# Patient Record
Sex: FEMALE | Race: OTHER | Employment: UNEMPLOYED | URBAN - METROPOLITAN AREA
[De-identification: names, ages, dates, MRNs, and addresses within clinical notes are randomized per-mention and may not be internally consistent; named-entity substitution may affect disease eponyms.]

---

## 2022-02-22 ENCOUNTER — OFFICE VISIT (OUTPATIENT)
Dept: FAMILY MEDICINE CLINIC | Facility: CLINIC | Age: 72
End: 2022-02-22

## 2022-02-22 ENCOUNTER — TRANSCRIBE ORDER (OUTPATIENT)
Dept: SCHEDULING | Age: 72
End: 2022-02-22

## 2022-02-22 ENCOUNTER — HOSPITAL ENCOUNTER (OUTPATIENT)
Dept: LAB | Age: 72
Discharge: HOME | End: 2022-02-22

## 2022-02-22 VITALS
SYSTOLIC BLOOD PRESSURE: 137 MMHG | DIASTOLIC BLOOD PRESSURE: 83 MMHG | HEART RATE: 69 BPM | BODY MASS INDEX: 34.63 KG/M2 | TEMPERATURE: 97 F | WEIGHT: 165 LBS | OXYGEN SATURATION: 96 % | HEIGHT: 58 IN | RESPIRATION RATE: 16 BRPM

## 2022-02-22 DIAGNOSIS — Z00.00 ROUTINE GENERAL MEDICAL EXAMINATION AT A HEALTH CARE FACILITY: ICD-10-CM

## 2022-02-22 DIAGNOSIS — Z87.11 HISTORY OF STOMACH ULCERS: ICD-10-CM

## 2022-02-22 DIAGNOSIS — M25.561 CHRONIC PAIN OF BOTH KNEES: ICD-10-CM

## 2022-02-22 DIAGNOSIS — M25.562 CHRONIC PAIN OF BOTH KNEES: ICD-10-CM

## 2022-02-22 DIAGNOSIS — R22.41 LUMP OF SKIN OF RIGHT LOWER EXTREMITY: ICD-10-CM

## 2022-02-22 DIAGNOSIS — Z87.19 HISTORY OF ABDOMINAL HERNIA: ICD-10-CM

## 2022-02-22 DIAGNOSIS — R22.41 LUMP OF SKIN OF RIGHT LOWER EXTREMITY: Primary | ICD-10-CM

## 2022-02-22 DIAGNOSIS — G89.29 CHRONIC PAIN OF BOTH KNEES: ICD-10-CM

## 2022-02-22 LAB
ALBUMIN SERPL-MCNC: 3.7 G/DL (ref 3.4–5)
ALBUMIN/GLOB SERPL: 1.1 {RATIO} (ref 0.8–1.7)
ALP SERPL-CCNC: 60 U/L (ref 45–117)
ALT SERPL-CCNC: 22 U/L (ref 13–56)
ANION GAP SERPL CALC-SCNC: 6 MMOL/L (ref 3–18)
AST SERPL-CCNC: 20 U/L (ref 10–38)
BASOPHILS # BLD: 0 K/UL (ref 0–0.1)
BASOPHILS NFR BLD: 1 % (ref 0–2)
BILIRUB SERPL-MCNC: 0.3 MG/DL (ref 0.2–1)
BUN SERPL-MCNC: 18 MG/DL (ref 7–18)
BUN/CREAT SERPL: 19 (ref 12–20)
CALCIUM SERPL-MCNC: 10 MG/DL (ref 8.5–10.1)
CHLORIDE SERPL-SCNC: 100 MMOL/L (ref 100–111)
CO2 SERPL-SCNC: 30 MMOL/L (ref 21–32)
CREAT SERPL-MCNC: 0.94 MG/DL (ref 0.6–1.3)
DIFFERENTIAL METHOD BLD: ABNORMAL
EOSINOPHIL # BLD: 0.2 K/UL (ref 0–0.4)
EOSINOPHIL NFR BLD: 3 % (ref 0–5)
ERYTHROCYTE [DISTWIDTH] IN BLOOD BY AUTOMATED COUNT: 12.9 % (ref 11.6–14.5)
GLOBULIN SER CALC-MCNC: 3.3 G/DL (ref 2–4)
GLUCOSE SERPL-MCNC: 114 MG/DL (ref 74–99)
HCT VFR BLD AUTO: 36.1 % (ref 35–45)
HGB BLD-MCNC: 11.9 G/DL (ref 12–16)
IMM GRANULOCYTES # BLD AUTO: 0 K/UL (ref 0–0.04)
IMM GRANULOCYTES NFR BLD AUTO: 0 % (ref 0–0.5)
LYMPHOCYTES # BLD: 0.9 K/UL (ref 0.9–3.6)
LYMPHOCYTES NFR BLD: 12 % (ref 21–52)
MCH RBC QN AUTO: 28.1 PG (ref 24–34)
MCHC RBC AUTO-ENTMCNC: 33 G/DL (ref 31–37)
MCV RBC AUTO: 85.1 FL (ref 78–100)
MONOCYTES # BLD: 0.8 K/UL (ref 0.05–1.2)
MONOCYTES NFR BLD: 10 % (ref 3–10)
NEUTS SEG # BLD: 5.5 K/UL (ref 1.8–8)
NEUTS SEG NFR BLD: 73 % (ref 40–73)
NRBC # BLD: 0 K/UL (ref 0–0.01)
NRBC BLD-RTO: 0 PER 100 WBC
PLATELET # BLD AUTO: 301 K/UL (ref 135–420)
PMV BLD AUTO: 9.8 FL (ref 9.2–11.8)
POTASSIUM SERPL-SCNC: 4 MMOL/L (ref 3.5–5.5)
PROT SERPL-MCNC: 7 G/DL (ref 6.4–8.2)
RBC # BLD AUTO: 4.24 M/UL (ref 4.2–5.3)
SODIUM SERPL-SCNC: 136 MMOL/L (ref 136–145)
WBC # BLD AUTO: 7.5 K/UL (ref 4.6–13.2)

## 2022-02-22 PROCEDURE — 85025 COMPLETE CBC W/AUTO DIFF WBC: CPT

## 2022-02-22 PROCEDURE — 86677 HELICOBACTER PYLORI ANTIBODY: CPT

## 2022-02-22 PROCEDURE — 80053 COMPREHEN METABOLIC PANEL: CPT

## 2022-02-22 PROCEDURE — 99204 OFFICE O/P NEW MOD 45 MIN: CPT | Performed by: CLINICAL NURSE SPECIALIST

## 2022-02-22 NOTE — PROGRESS NOTES
MARIA ELENA Montejo is a 66 y.o. female being seen today for   Chief Complaint   Patient presents with    Skin Problem     on the right inner thigh. Pt first notice in 10/2022. Pt is starting to experiencing burning and leg swelling   . Patient presents to this visit with her daughter. States that she recently relocated to the [place] from [facility] on a visa. Per daughter, mom was alone in [address] for 4 years with no one to watch out for her. Would talk to the patient on the phone daily, but mom always reassured her that everything was okay. Upon arrival, daughter noted that patient had a \"ball\" to her right inner thigh. The ball was small, but is now the size of an orange. Patient admits that she first noticed this lump in October of last year. Went to the doctor in [address], they did some tests, but have not received the results. Patient admits that this lump was not initially painful, but now it has a burning feeling. Admits that RLE sometimes swell. Never has LLE swelling. Daughter states that she is very concerned as they recently had a friend who passed away d/t cancer on his face and he reported that it would burn. Also mom has some discomfort to her knees, this is not new. Wonders what she should take to help with this. In [address], was told that she had a stomach ulcer as well as a hernia to the right of her belly button. They advised that they would need to manage the lump prior to working on her hernia. Has never been tested for H. Pylori that she is aware of. Pt has a longstanding history of HTN and DM, has all of her medications with her from [address] and does not need any refills at this time. Past Medical History:   Diagnosis Date    Diabetes ([facility]     Hypertension          ROS  Patient states that she is feeling well. Denies complaints of chest pain, shortness of breath, swelling of legs, dizziness or weakness. she denies nausea, vomiting or diarrhea.         Current Outpatient Medications   Medication Sig    LOSARTAN PO Take  by mouth.  verapamil HCl (VERAPAMIL PO) Take  by mouth.  metformin HCl (METFORMIN PO) Take  by mouth.  GLIPIZIDE PO Take  by mouth. No current facility-administered medications for this visit. PE  Visit Vitals  /83 (BP 1 Location: Left arm, BP Patient Position: Sitting, BP Cuff Size: Large adult long)   Pulse 69   Temp 97 °F (36.1 °C) (Temporal)   Resp 16   Ht 4' 10\" (1.473 m)   Wt 165 lb (74.8 kg)   SpO2 96%   BMI 34.49 kg/m²        Alert and oriented with normal mood and affect. she is well developed and well nourished . Lungs are clear without wheezing. Heart rate is regular without murmurs or gallops. There is no lower extremity edema. RLE: medial thigh with slightly fixed, soft, flesh-colored lump about the size of a baseball, nontender to touch     No results found for this or any previous visit. Assessment and Plan:    Baseline labs obtained this visit. Uncertain what to make of lump to RT medial thigh. Order for US of lump, may need further imaging or biopsy if unremarkable. Will likely initiate a PPI, however, would like to first r/o H. Pylori given H&P  Referral for GI for management of hernia and ulcer. Discussed OTC management of knee pain. Encouraged to call with questions or concerns. Will f/u per labs/US and advise on any additional POC. ICD-10-CM ICD-9-CM    1. Lump of skin of right lower extremity  R22.41 782.2       US EXT NONVAS RT COMP   2. Routine general medical examination at a health care facility  Z00.00 V70.0 H PYLORI AB, IGG, QT      CBC WITH AUTOMATED DIFF      METABOLIC PANEL, COMPREHENSIVE      REFERRAL TO GASTROENTEROLOGY   3. History of stomach ulcers  Z87.11 V12.79    4.  History of abdominal hernia  Z87.19 V12.79            Mandy Meza NP

## 2022-02-22 NOTE — PROGRESS NOTES
Patient presents for lab draw ordered by:    Ordering Provider:  Ariella Johnson  Ordering Department/Practice:  [facility]  Phone:  [phone]  Date Ordered:  2/22/2022    The following labs were drawn and sent to [facility] by Yoni Starr:    CBC, BMP and H Pylori    The following tubes were sent:    Gold  ( 2) and Lavender  ( 1)    Draw site left brachial.  Patient tolerated draw with no distress.

## 2022-02-22 NOTE — PROGRESS NOTES
Pat financial assistance application given to patient daughter    Patient daughter advised of ultrasound appointment 2/26/22 2:30 pm arrive at 2:00 pm [facility]     Discharge instructions reviewed with patient daughter    Medication list and understanding of medications reviewed with patient daughter. OTC and herbal medications reviewed and added to med list if applicable  Barriers to adherence assessed. Guidance given regarding new medications this visit, including reason for taking this medicine, and common side effects. AVS given to patient daughter. Explained to patient daughter. Patient daughter expressed understanding.

## 2022-02-23 LAB — H PYLORI IGG SER IA-ACNC: 1.52 INDEX VALUE (ref 0–0.79)

## 2022-02-23 NOTE — PROGRESS NOTES
I have reviewed the attatched progress note and I agree with the plan and medical decision making as outlined by Herve Verdugo MD

## 2022-02-25 ENCOUNTER — TELEPHONE (OUTPATIENT)
Dept: FAMILY MEDICINE CLINIC | Facility: CLINIC | Age: 72
End: 2022-02-25

## 2022-02-25 NOTE — TELEPHONE ENCOUNTER
Patient daughter advised of Gastroenterology appointment Dr Vishal Castanon  4/7/22 3:30 pm [address] [facility] [phone] [id number] $50.00 co pay due at time of service

## 2022-02-26 ENCOUNTER — HOSPITAL ENCOUNTER (OUTPATIENT)
Dept: ULTRASOUND IMAGING | Age: 72
Discharge: HOME | End: 2022-02-26

## 2022-02-26 DIAGNOSIS — R22.41 LUMP OF SKIN OF RIGHT LOWER EXTREMITY: ICD-10-CM

## 2022-02-26 PROCEDURE — 76882 US LMTD JT/FCL EVL NVASC XTR: CPT

## 2022-02-28 ENCOUNTER — TELEPHONE (OUTPATIENT)
Dept: FAMILY MEDICINE CLINIC | Facility: CLINIC | Age: 72
End: 2022-02-28

## 2022-02-28 DIAGNOSIS — Z12.31 ENCOUNTER FOR SCREENING MAMMOGRAM FOR MALIGNANT NEOPLASM OF BREAST: ICD-10-CM

## 2022-02-28 DIAGNOSIS — R22.41 LUMP OF SKIN OF RIGHT LOWER EXTREMITY: Primary | ICD-10-CM

## 2022-02-28 DIAGNOSIS — Z12.11 SCREENING FOR COLON CANCER: ICD-10-CM

## 2022-02-28 DIAGNOSIS — Z12.4 SCREENING FOR CERVICAL CANCER: ICD-10-CM

## 2022-02-28 NOTE — TELEPHONE ENCOUNTER
Spoke to patient's daughter Delaney Specking. Patient present with Delaney Specking during conversation. Mitrionics  14316 utilized. Discussed results of RLE ultrasound. States that they received results from biopsy in [address] that stated that it was cancer and that it originated at the site of the lump. She also mentioned that they advised that it should be taken care of immediately as there isn't a ton of time left. Has never had a screening colonoscopy. Had a mammogram 3 years ago that was normal, was told that she could discontinue these. Had her last pap smear two years ago, normal, was told that it was ok to discontinue paps. Discussed referral to general surgery for biopsy as well as referral to GI for screening colonoscopy, will suggest an endoscopy as well d/t reported history of ulcer and elevated H. Pylori Ab IGG. Uncertain why AB did not reflex for igM, will repeat this at f/u. Encouraged to maintain f/u for this coming Wednesday and to bring biopsy report from [address] with her. Understanding of all teaching verbalized. Collaborated  on treatment plan with Dr. Lakeisha Kaye.

## 2022-03-02 ENCOUNTER — HOSPITAL ENCOUNTER (OUTPATIENT)
Dept: LAB | Age: 72
Discharge: HOME | End: 2022-03-02

## 2022-03-02 ENCOUNTER — OFFICE VISIT (OUTPATIENT)
Dept: FAMILY MEDICINE CLINIC | Facility: CLINIC | Age: 72
End: 2022-03-02

## 2022-03-02 ENCOUNTER — TELEPHONE (OUTPATIENT)
Dept: FAMILY MEDICINE CLINIC | Facility: CLINIC | Age: 72
End: 2022-03-02

## 2022-03-02 VITALS
OXYGEN SATURATION: 94 % | SYSTOLIC BLOOD PRESSURE: 119 MMHG | DIASTOLIC BLOOD PRESSURE: 78 MMHG | TEMPERATURE: 97.3 F | HEART RATE: 85 BPM | BODY MASS INDEX: 35.05 KG/M2 | WEIGHT: 167 LBS | RESPIRATION RATE: 18 BRPM | HEIGHT: 58 IN

## 2022-03-02 DIAGNOSIS — R76.8 HELICOBACTER PYLORI ANTIBODY POSITIVE: ICD-10-CM

## 2022-03-02 DIAGNOSIS — M17.0 PRIMARY OSTEOARTHRITIS OF BOTH KNEES: ICD-10-CM

## 2022-03-02 DIAGNOSIS — R22.41 LUMP OF SKIN OF RIGHT LOWER EXTREMITY: Primary | ICD-10-CM

## 2022-03-02 DIAGNOSIS — E11.9 DIABETES MELLITUS WITHOUT COMPLICATION (HCC): ICD-10-CM

## 2022-03-02 DIAGNOSIS — R11.0 NAUSEA: ICD-10-CM

## 2022-03-02 LAB
EST. AVERAGE GLUCOSE BLD GHB EST-MCNC: 148 MG/DL
HBA1C MFR BLD: 6.8 % (ref 4.2–5.6)

## 2022-03-02 PROCEDURE — 83036 HEMOGLOBIN GLYCOSYLATED A1C: CPT

## 2022-03-02 PROCEDURE — 86677 HELICOBACTER PYLORI ANTIBODY: CPT

## 2022-03-02 PROCEDURE — 99214 OFFICE O/P EST MOD 30 MIN: CPT | Performed by: FAMILY MEDICINE

## 2022-03-02 RX ORDER — ONDANSETRON 4 MG/1
4 TABLET, ORALLY DISINTEGRATING ORAL
Qty: 30 TABLET | Refills: 1 | Status: SHIPPED | OUTPATIENT
Start: 2022-03-02 | End: 2022-04-20 | Stop reason: SDUPTHER

## 2022-03-02 NOTE — TELEPHONE ENCOUNTER
Referral faxed to [facility] surgical specialist/Dr Fannie Box nurse .  They will call patient to schedule evaluation for colon cancer screening

## 2022-03-02 NOTE — PROGRESS NOTES
Patient presents for lab draw ordered by:    Ordering Provider: Clayton Baig NP  Ordering Department/Practice:  [address] [facility]  Phone:  [phone]  Date Ordered:  3/2/22    The [facility] Le[facility]:    HgA1C and H Pylori AB    The following tubes were sent:    Gold  ( 1) and Lavender  ( 1)    Draw site right brachial.  Patient tolerated draw with no distress.

## 2022-03-02 NOTE — PROGRESS NOTES
Patient daughter advised of appointment with surgeon Dr Kimberly Landrum  3/10/22 10:30 am [address] [phone] Daughter also advised colorectal surgeon (Dr Walker Ruff ) office will be calling her to schedule appointment for colon cancer screening. Patient daughter advised will fax paper work to [facility] [employer] [facility] [facility] they will call her  to schedule mother's  mammogram    Good Rx card given to patient daughter    Discharge instructions reviewed with patient daughtet    Medication list and understanding of medications reviewed with patient  daughter. OTC and herbal medications reviewed and added to med list if applicable  Barriers to adherence assessed. Guidance given regarding new medications this visit, including reason for taking this medicine, and common side effects. AVS given to patient daughter. Explained to patient daughter. Patient daughter expressed understanding.

## 2022-03-02 NOTE — PROGRESS NOTES
MARIA ELENA  Jamie Aguiar is a 66 y.o. female being seen today for   Chief Complaint   Patient presents with    Knee Pain     both knee   . she states that she is generally doing alright. Daughter present with her this visit. Previously was seen for RLE hard mass that has grown over time. Had an [address] last week, radiologist impression was pathologically enlarged lymph node vs soft tissue neoplasm given the sonographic appearance. No prior imaging on file. [place] [address] had a biopsy of site, recently received results, brought them in this visit. 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 Patient's daughter spoke to the doctor who said that from their standpoint it is inoperable due to the location. States that blood sugar has been low recently. Admits that she has nausea which interferes with her appetite. This has been going on for the last two months. Daughter states that she makes mom a fruit or vegetable smoothie every morning which fills the patient up. Was taking a medication that she received in [address] for the nausea, but it did not help. Also has dizziness at times. Has a longstanding hx of OA. Having bilateral knee pain that is 10/10 with climbing stairs, has had a joint injection in the left knee before. Wonders if it would be an option to get both knees injected in the near future. Has tried tylenol as well as ice with little to no relief.  #[id number] used for portions of this visit. Past Medical History:   Diagnosis Date    Diabetes ([facility] [address] [facility]     Hypertension          ROS  Patient states that she is feeling well. Denies complaints of chest pain, shortness of breath, swelling of legs, dizziness or weakness. she denies vomiting or diarrhea.    +nausea    Current Outpatient Medications   Medication Sig    ondansetron (ZOFRAN ODT) 4 mg disintegrating tablet Take 1 Tablet by mouth every eight (8) hours as needed for Nausea or Vomiting.  LOSARTAN PO Take  by mouth.     metformin HCl (METFORMIN PO) Take  by mouth.  GLIPIZIDE PO Take  by mouth.  verapamil HCl (VERAPAMIL PO) Take  by mouth. No current facility-administered medications for this visit. PE  Visit Vitals  /78 (BP 1 Location: Left upper arm, BP Patient Position: Sitting, BP Cuff Size: Adult)   Pulse 85   Temp 97.3 °F (36.3 °C) (Temporal)   Resp 18   Ht 4' 10\" (1.473 m)   Wt 167 lb (75.8 kg)   SpO2 94%   BMI 34.90 kg/m²        Alert and oriented with normal mood and affect. Results for orders placed or performed during the hospital encounter of 02/22/22   H PYLORI AB, IGG, QT   Result Value Ref Range    H. pylori Ab, IgG 1.52 (H) 0.00 - 0.79 Index Value   CBC WITH AUTOMATED DIFF   Result Value Ref Range    WBC 7.5 4.6 - 13.2 K/uL    RBC 4.24 4.20 - 5.30 M/uL    HGB 11.9 (L) 12.0 - 16.0 g/dL    HCT 36.1 35.0 - 45.0 %    MCV 85.1 78.0 - 100.0 FL    MCH 28.1 24.0 - 34.0 PG    MCHC 33.0 31.0 - 37.0 g/dL    RDW 12.9 11.6 - 14.5 %    PLATELET 271 323 - 534 K/uL    MPV 9.8 9.2 - 11.8 FL    NRBC 0.0 0  WBC    ABSOLUTE NRBC 0.00 0.00 - 0.01 K/uL    NEUTROPHILS 73 40 - 73 %    LYMPHOCYTES 12 (L) 21 - 52 %    MONOCYTES 10 3 - 10 %    EOSINOPHILS 3 0 - 5 %    BASOPHILS 1 0 - 2 %    IMMATURE GRANULOCYTES 0 0.0 - 0.5 %    ABS. NEUTROPHILS 5.5 1.8 - 8.0 K/UL    ABS. LYMPHOCYTES 0.9 0.9 - 3.6 K/UL    ABS. MONOCYTES 0.8 0.05 - 1.2 K/UL    ABS. EOSINOPHILS 0.2 0.0 - 0.4 K/UL    ABS. BASOPHILS 0.0 0.0 - 0.1 K/UL    ABS. IMM.  GRANS. 0.0 0.00 - 0.04 K/UL    DF AUTOMATED     METABOLIC PANEL, COMPREHENSIVE   Result Value Ref Range    Sodium 136 136 - 145 mmol/L    Potassium 4.0 3.5 - 5.5 mmol/L    Chloride 100 100 - 111 mmol/L    CO2 30 21 - 32 mmol/L    Anion gap 6 3.0 - 18 mmol/L    Glucose 114 (H) 74 - 99 mg/dL    BUN 18 7.0 - 18 MG/DL    Creatinine 0.94 0.6 - 1.3 MG/DL    BUN/Creatinine ratio 19 12 - 20      GFR est AA >60 >60 ml/min/1.73m2    GFR est non-AA 58 (L) >60 ml/min/1.73m2    Calcium 10.0 8.5 - 10.1 MG/DL    Bilirubin, total 0.3 0.2 - 1.0 MG/DL    ALT (SGPT) 22 13 - 56 U/L    AST (SGOT) 20 10 - 38 U/L    Alk. phosphatase 60 45 - 117 U/L    Protein, total 7.0 6.4 - 8.2 g/dL    Albumin 3.7 3.4 - 5.0 g/dL    Globulin 3.3 2.0 - 4.0 g/dL    A-G Ratio 1.1 0.8 - 1.7           Assessment and Plan:    Discussed appointment with general surgery on 3/10. Advised that we will not know what treatment options may be until evaluated by gen surg. Discussed indication for colonoscopy and mammogram.   High suspicion that nausea is related to not eating and stomach ulcer. Pending H. Pylori IgM, will likely treat with PPI and antibiotics. Hopeful that colorectal specialist will do an endoscopy at the same time as colonoscopy. Dizziness seems to correlate with decreased appetite and low BS. Educated on small frequent meals as well as zofran and indication. Aware of need to DC antiemetic from [address]. Advised on indication for avoiding antiinflammatories/steroids for now. Declined topical pain reliever. Encouraged to return in near future for bilateral knee injections. Will f/u per labs and advise on any additional POC. Instructed to call with any questions or concerns. ICD-10-CM ICD-9-CM    1. Lump of skin of right lower extremity  R22.41 782.2    2. Helicobacter pylori antibody positive  R76.8 795.79 H PYLORI AB, IGM   3. Nausea  R11.0 787.02    4.  Di[facility] mellitus without complication [facility]  B34.3 250.00 HEMOGLOBIN A1C WITH EAG           Jet Rogers NP

## 2022-03-02 NOTE — TELEPHONE ENCOUNTER
Client Eligibility form faxed to [facility] at [phone] .  They will call her to schedule mammogram

## 2022-03-02 NOTE — PATIENT INSTRUCTIONS
Aprenda sobre la colonoscopia  Learning About Colonoscopy  ¿Qué es matilde colonoscopia? Matilde colonoscopia es matilde prueba (también llamada procedimiento) que le permite a un médico christine dentro del intestino grueso. El médico [place] un tubo avelar con kari, llamado colonoscopio. El médico lo [place] para buscar pequeños crecimientos llamados pólipos, cáncer de colon o recto (cáncer colorrectal), u otros problemas jeannie sangrado. Hong el procedimiento, el médico puede papi muestras de tejido. Luego, las muestras pueden ser analizadas para christine si tienen cáncer u otras afecciones. El médico también puede extraer los pólipos. ¿Cómo se hace matilde colonoscopia? Subha procedimiento se lleva a cabo en el consultorio de un médico, en matilde clínica o en un hospital. Makayla Jens un medicamento para ayudarle a relajarse y para que no sienta dolor. Algunas personas observan que no recuerdan que se les haya hecho la prueba debido al [employer]. El médico mueve suavemente el colonoscopio (o endoscopio) a través del colon. El endoscopio también es matilde pequeña cámara de video. Le permite al médico christine el colon y obtener imágenes. ¿Cómo se prepara usted para el procedimiento? Usted necesita limpiar el colon antes del procedimiento para que el médico pueda observar el colon. [place] depende del tipo de \"preparación del colon\" que le recomiende vuong médico.  Para limpiar el colon, usted hará matilde preparación del colon antes de la prueba. [place] significa dejar de comer alimentos sólidos y beber solamente líquidos siobhan. Usted puede consumir agua, té, café, jugos siobhan, caldos siobhan, paletas de agua saborizadas y gelatina (jeannie Jell-O). No kayy nada de color jain o aly. El día o la noche antes del procedimiento, usted eder matilde gran cantidad de un líquido especial. [place] causa heces flojas y frecuentes. Irá muchas veces al baño. Vuong médico podría indicarle que tome parte del líquido la noche anterior y el khadar el día de la prueba.  Es [address] [facility] importante que queta todo el líquido. Si tiene problemas para beberlo, llame a vunog Cynda Gallus arreglos con alguien para que lo lleve a casa después de la prueba. ¿Qué puede esperar después de matilde colonoscopia? Vuong médico le indicará cuándo podrá comer y hacer thor actividades habituales. Queta abundantes líquidos después de la prueba para reponer los líquidos que pueda danial perdido marty la preparación del colon. [place] no queta alcohol. Vuong médico hablará con usted acerca de cuándo deberá hacerse la próxima colonoscopia. Los resultados de vuong prueba y vuong riesgo de cáncer colorrectal ayudarán a vuong médico a decidir con qué frecuencia necesita hacerse controles. Después de la prueba, podría sentirse abotagado o tener herminio por gases. Simone vez necesite expulsar gases. Si le hicieron matilde biopsia o le extirparon un pólipo, podría tener rastros de [place] en las heces por unos días. Consulte a vuong médico para saber cuándo es seguro papi aspirina o medicamentos antiinflamatorios no esteroides (EDY) nuevamente. Problemas jeannie sangrado rectal intenso podrían no producirse hasta varias semanas después de la prueba. [place] no es común. Chaka podría suceder después de la extracción de pólipos. La atención de seguimiento es matilde parte clave de vuong tratamiento y seguridad. Asegúrese de hacer y acudir a todas las citas, y llame a vuong médico si está teniendo problemas. También es matilde buena idea saber los resultados de thor exámenes y mantener matilde lista de los medicamentos que ambrocio. ¿Dónde puede encontrar más información en inglés? Vaya a http://www.murillo.com/  Rodger Ruff [id number] en la búsqueda para aprender más acerca de \"Aprenda sobre la colonoscopia. \"  Revisado: 17 diciembre, 2020               Versión del contenido: 13.0  © 3472-8851 Healthwise, Incorporated. Las instrucciones de cuidado fueron adaptadas bajo licencia por [employer] (which disclaims liability or warranty for this information).  Si usted tiene [place] afección médica o sobre estas instrucciones, siempre pregunte a vuong profesional de linda. [employer] Incorporated niega toda garantía o responsabilidad por vuong uso de esta información. Infección bacteriana por H. pylori: Instrucciones de cuidado  H. Pylori Bacterial Infection: Care Instructions  Instrucciones de cuidado    Vuong prueba muestra la presencia de Helicobacter pylori (H. pylori), un tipo de bacteria que vive en el revestimiento del estómago. Muchas personas tienen H. pylori en thor estómagos y no tienen problemas. Chaka algunas veces la H. pylori causa malestar estomacal o matilde llaga (úlcera) en el revestimiento del estómago. La mayoría de las úlceras estomacales son causadas por la H. pylori. Entre los síntomas de matilde úlcera se encuentran dolor persistente o abrasador en el vientre que puede durar desde unos minutos hasta horas. El consumo de alimentos o antiácidos ayuda a aliviar el dolor, [employer] síntomas podrían reaparecer después de un [place]. Un antibiótico puede curar matilde infección por H. pylori. La atención de seguimiento es matilde parte clave de vuong tratamiento y seguridad. Asegúrese de hacer y acudir a todas las citas, y llame a vuong médico si está teniendo problemas. También es matilde buena idea saber los resultados de thor exámenes y mantener matilde lista de los medicamentos que ambrocio. ¿Cómo puede cuidarse en el hogar? · [address] No deje de tomarlos por el hecho de sentirse mejor. Debe papi todos los antibióticos hasta terminarlos. · Si vuong médico le receta otros medicamentos, tómelos exactamente según las indicaciones. Llame a vuong médico si joseph estar teniendo problemas con vuong medicamento. Recibirá más detalles sobre el medicamento específico recetado por vuong médico.  · Siga matilde dieta saludable y balanceada. ? Coma comidas más pequeñas, con mayor frecuencia. Asegúrese de consumir por lo menos terence comidas al día.   ? Evite los alimentos grasosos o muy condimentados. ? No tome bebidas que contengan cafeína si le hacen mal al [place]. Éstas incluyen café, té y sodas. · No fume. El hábito de fumar demora la curación de vuong úlcera y puede provocar la recurrencia de Lonita Postin. Si necesita ayuda para dejar de fumar, hable con vuong médico [employer] y medicamentos para dejar de fumar. Éstos pueden aumentar maria antonia probabilidades de dejar el hábito para siempre. · Limite la cantidad de alcohol que eder. El alcohol puede retrasar la curación de la úlcera y puede [employer] síntomas. · Lávese las matteo después de ir al baño. · Evite papi aspirina, ibuprofeno u otros antiinflamatorios, ya que pueden irritar el estómago. Si necesita un analgésico (medicamento para el dolor), trate de papi acetaminofén (Tylenol). ¿Cuándo debe pedir ayuda? Llame al 911 en cualquier momento que considere que necesita atención de emergencia. Por ejemplo, llame si:    · Se desmayó (perdió el conocimiento).   · Vomita sebas o algo parecido a granos de café molido.     · Las heces son de color rojizo o muy sanguinolentas (con sebas). Llame a vuong médico ahora mismo o busque atención médica inmediata si:    · Tiene dolor intenso en el vientre, la espalda o los hombros.     · Tiene dolor abdominal nuevo o que empeora.     · Siente mareos o aturdimiento, o que está a punto de desmayarse.     · Maria Antonia heces son negruzcas y parecidas al alquitrán o tienen rastros de [place]. Preste especial atención a los cambios en vuong linda y asegúrese de comunicarse con vuong médico si:    · Tiene síntomas nuevos jeannie pérdida de peso, náuseas o vómito.     · No mejora jeannie se esperaba. ¿Dónde puede encontrar más información en inglés? Roseanne Dillard a [email or web address].[email or web address]  [place] [id number] en la búsqueda para aprender más acerca de \"Infección bacteriana por H. pylori: Instrucciones de cuidado. \"  Revisado: 10 febrero, 2021               Versión del contenido: 13.0  © 5666-2406 Healthwise, [employer]. Las instrucciones de cuidado fueron adaptadas bajo licencia por [employer] (which disclaims liability or warranty for this information). Si usted tiene [place] afección médica o sobre estas instrucciones, siempre pregunte a vuong profesional de linda. Healthwise, Incorporated niega toda garantía o responsabilidad por vuong uso de esta información.

## 2022-03-03 LAB — H PYLORI IGM SER-ACNC: <9 UNITS (ref 0–8.9)

## 2022-03-04 DIAGNOSIS — R73.09 HEMOGLOBIN A1C LESS THAN 7.0%: ICD-10-CM

## 2022-03-04 DIAGNOSIS — R76.8 HELICOBACTER PYLORI ANTIBODY POSITIVE: Primary | ICD-10-CM

## 2022-03-04 RX ORDER — AMOXICILLIN 500 MG/1
500 CAPSULE ORAL 3 TIMES DAILY
Qty: 42 CAPSULE | Refills: 0 | Status: SHIPPED | OUTPATIENT
Start: 2022-03-04 | End: 2022-03-11

## 2022-03-04 RX ORDER — OMEPRAZOLE 20 MG/1
20 CAPSULE, DELAYED RELEASE ORAL 2 TIMES DAILY
Qty: 28 CAPSULE | Refills: 0 | Status: SHIPPED | OUTPATIENT
Start: 2022-03-04 | End: 2022-03-18

## 2022-03-04 RX ORDER — LEVOFLOXACIN 500 MG/1
500 TABLET, FILM COATED ORAL DAILY
Qty: 14 TABLET | Refills: 0 | Status: SHIPPED | OUTPATIENT
Start: 2022-03-04 | End: 2022-03-11

## 2022-03-04 RX ORDER — AMOXICILLIN 250 MG/1
250 CAPSULE ORAL 3 TIMES DAILY
Qty: 30 CAPSULE | Refills: 0 | Status: SHIPPED | OUTPATIENT
Start: 2022-03-04 | End: 2022-03-11

## 2022-03-04 NOTE — PROGRESS NOTES
Contacted patient to discuss lab results. Daughter present with her during telephone encounter. Discussed significance of A1c below 7. Patient taking metformin 875 mg BID and glipizide 5 mg. Advised to discontinue glipizide for now especially given hx of low BS episodes. Discussed possibility that malignancy may have originated in stomach given reported hx of gastric ulcers and + H. Pylori AB. Advised that will likely need endoscopy along with colonoscopy. Encouraged to call with any questions or concerns. H. Pylori triple therapy initiated, would have liked to utilize clarithromycin but unable d/t potential interaction with patient's verapamil.

## 2022-03-08 ENCOUNTER — TELEPHONE (OUTPATIENT)
Dept: FAMILY MEDICINE CLINIC | Facility: CLINIC | Age: 72
End: 2022-03-08

## 2022-03-08 DIAGNOSIS — C85.95 LYMPHOMA OF RIGHT INGUINAL REGION (HCC): Primary | ICD-10-CM

## 2022-03-08 NOTE — TELEPHONE ENCOUNTER
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 Would like to r/o a GI cancer as origin. Appt with GI isn't until 4/7. Multiple unsuccessful attempts to touch base with Dr. Phill Malone office to get patient seen sooner . States that she would like to do whatever is recommended by the care a juvenal. Initial appt with surgery on 3/10. Advised of appt for CT on 3/16 at 330 PM at [facility]. Provided with pre procedure instructions including NPO 4 hrs prior to procedure. Aware of need to  contrast from [facility] prior to this appointment. Encouraged to call with any questions or concerns.

## 2022-03-10 ENCOUNTER — OFFICE VISIT (OUTPATIENT)
Dept: SURGERY | Age: 72
End: 2022-03-10

## 2022-03-10 VITALS
TEMPERATURE: 98.2 F | WEIGHT: 166.6 LBS | SYSTOLIC BLOOD PRESSURE: 127 MMHG | HEIGHT: 58 IN | OXYGEN SATURATION: 96 % | HEART RATE: 90 BPM | BODY MASS INDEX: 34.97 KG/M2 | DIASTOLIC BLOOD PRESSURE: 67 MMHG

## 2022-03-10 DIAGNOSIS — R19.09 RIGHT GROIN MASS: Primary | ICD-10-CM

## 2022-03-10 PROCEDURE — 99203 OFFICE O/P NEW LOW 30 MIN: CPT | Performed by: SURGERY

## 2022-03-10 NOTE — PROGRESS NOTES
Kevin Laird is a 66 y.o. female (: 1950) presenting to address:    Chief Complaint   Patient presents with    New Patient     Mass on right thigh/ referred by Ansley Zavala NP       Medication list and allergies have been reviewed with Kevin Laird and updated as of today's date. I have gone over all Medical, Surgical and Social History with Kevin Laird and updated/added the information accordingly.

## 2022-03-11 ENCOUNTER — TELEPHONE (OUTPATIENT)
Dept: FAMILY MEDICINE CLINIC | Facility: CLINIC | Age: 72
End: 2022-03-11

## 2022-03-11 DIAGNOSIS — C85.95 LYMPHOMA OF RIGHT INGUINAL REGION (HCC): Primary | ICD-10-CM

## 2022-03-11 NOTE — TELEPHONE ENCOUNTER
Contacted patient to discuss appointment with surgery yesterday. Patient states that the surgical team said that they need to do the CT first and depending on what that shows, they'd be willing to proceed with a biopsy. Advised that it is ok to discontinue antibiotics, h. Pylori IgM not elevated. encouraged to continue PPI. Admits that she is concerned that she will not be contacted by [facility] as she may have written down the wrong number. Reassured that this is less of a priority right now, however, we have the correct number on file and will ensure that [facility] does as well.  used for portions of this visit, [id number].

## 2022-03-14 NOTE — PROGRESS NOTES
General Surgery Consult    Henny Dhaliwal  Admit date: (Not on file)    MRN: [id number]     : 1950     Age: 66 y.o. Attending Physician: Venkatesh Rudd MD, [facility]      History of Present Illness:      Henny Dhaliwal is a 66 y.o. female who I was consulted for evaluation of a soft tissue mass located on the right upper thigh/groin area. The patient was here today with her daughter and the patient does not speak English so Ja Yovani was able to translate for us. It seems that the patient was diagnosed already with malignancy in her native country before coming here about 2 weeks ago. It seems that she had this mass for about 3 to 4-month now and she had a biopsy done and according to them it showed some type of either lymphoma or metastatic cancer. The patient has been already seen by the medical team and they ordered an ultrasound which showed a soft tissue mass and she is scheduled for a CT scan on the 16. Patient Active Problem List    Diagnosis Date Noted    Primary osteoarthritis of both knees 2022    Diabetes mellitus without complication ([address]     Helicobacter pylori antibody positive 2022    Lump of skin of right lower extremity 2022     Past Medical History:   Diagnosis Date    Diabetes ([address]     Hypertension       Past Surgical History:   Procedure Laterality Date    HX  SECTION      HX HYSTERECTOMY        Social History     Tobacco Use    Smoking status: Never Smoker    Smokeless tobacco: Never Used   Substance Use Topics    Alcohol use: Never      Social History     Tobacco Use   Smoking Status Never Smoker   Smokeless Tobacco Never Used     History reviewed. No pertinent family history. Current Outpatient Medications   Medication Sig    omeprazole (PRILOSEC) 20 mg capsule Take 1 Capsule by mouth two (2) times a day for 14 days.     ondansetron (ZOFRAN ODT) 4 mg disintegrating tablet Take 1 Tablet by mouth every eight (8) hours as needed for Nausea or Vomiting.  LOSARTAN PO Take  by mouth.  verapamil HCl (VERAPAMIL PO) Take  by mouth.  metformin HCl (METFORMIN PO) Take  by mouth. No current facility-administered medications for this visit. Allergies   Allergen Reactions    Penicillins Nausea Only and Vertigo          Review of Systems:  Pertinent items are noted in the History of Present Illness. Objective:     Visit Vitals  /67 (BP 1 Location: Right arm, BP Patient Position: Sitting, BP Cuff Size: Small adult)   Pulse 90   Temp 98.2 °F (36.8 °C)   Ht 4' 10\" (1.473 m)   Wt 75.6 kg (166 lb 9.6 oz)   SpO2 96%   BMI 34.82 kg/m²       Physical Exam:      General:  in no apparent distress, alert and oriented times 3                   Abdomen:   rounded, soft, nontender, nondistended. There is a hard mass located in the right groin area extending to the thigh. The mass is more than 10 cm and it is very hard and nonmovable. Imaging and Lab Review:     CBC:   Lab Results   Component Value Date/Time    WBC 7.5 02/22/2022 11:26 AM    RBC 4.24 02/22/2022 11:26 AM    HGB 11.9 (L) 02/22/2022 11:26 AM    HCT 36.1 02/22/2022 11:26 AM    PLATELET 577 23/06/1516 11:26 AM     BMP:   Lab Results   Component Value Date/Time    Glucose 114 (H) 02/22/2022 11:26 AM    Sodium 136 02/22/2022 11:26 AM    Potassium 4.0 02/22/2022 11:26 AM    Chloride 100 02/22/2022 11:26 AM    CO2 30 02/22/2022 11:26 AM    BUN 18 02/22/2022 11:26 AM    Creatinine 0.94 02/22/2022 11:26 AM    Calcium 10.0 02/22/2022 11:26 AM     CMP:  Lab Results   Component Value Date/Time    Glucose 114 (H) 02/22/2022 11:26 AM    Sodium 136 02/22/2022 11:26 AM    Potassium 4.0 02/22/2022 11:26 AM    Chloride 100 02/22/2022 11:26 AM    CO2 30 02/22/2022 11:26 AM    BUN 18 02/22/2022 11:26 AM    Creatinine 0.94 02/22/2022 11:26 AM    Calcium 10.0 02/22/2022 11:26 AM    Anion gap 6 02/22/2022 11:26 AM    BUN/Creatinine ratio 19 02/22/2022 11:26 AM    Alk. phosphatase 60 02/22/2022 11:26 AM    Protein, total 7.0 02/22/2022 11:26 AM    Albumin 3.7 02/22/2022 11:26 AM    Globulin 3.3 02/22/2022 11:26 AM    A-G Ratio 1.1 02/22/2022 11:26 AM       No results found for this or any previous visit (from the past 24 hour(s)). images and reports reviewed    Assessment:   Dennie Molt is a 66 y.o. female who is presenting with a soft tissue mass in the right groin area and upper thigh that has been present for few months now and she already had a biopsy [place] that showed malignancy but we do not know the exact pathology. Based on this history and the my examination it seems it is very clear that this is a malignant lymph node that most likely metastatic because there is no other lymphadenopathy. I think we will have to wait for the CT scan before me deciding on doing a biopsy because if we can do an ultrasound-guided biopsy that would be better than doing an open especially with the location of the mass and how large it is. The patient is already scheduled for the CT scan on the 16th of this month. I explained to the patient and her daughter that I would like to avoid the open biopsy because of lymph leak and more complication compared to a fine-needle biopsy through an ultrasound-guided or CT scan guided.      Plan:     Await for the results of the CT scan  Screening for intra-abdominal malignancies  Consider ultrasound-guided biopsy of the lymph node/mass   follow-up af if an open biopsy is needed but would like to try to avoid it if possible    Please call me if you have any questions (cell phone: [phone])     Signed By: Jo Ortiz MD     March 14, 2022

## 2022-03-16 ENCOUNTER — HOSPITAL ENCOUNTER (OUTPATIENT)
Dept: CT IMAGING | Age: 72
Discharge: HOME | End: 2022-03-16

## 2022-03-16 ENCOUNTER — TELEPHONE (OUTPATIENT)
Dept: FAMILY MEDICINE CLINIC | Facility: CLINIC | Age: 72
End: 2022-03-16

## 2022-03-16 DIAGNOSIS — C85.95 LYMPHOMA OF RIGHT INGUINAL REGION (HCC): ICD-10-CM

## 2022-03-16 PROCEDURE — 74011000636 HC RX REV CODE- 636: Performed by: CLINICAL NURSE SPECIALIST

## 2022-03-16 PROCEDURE — 74177 CT ABD & PELVIS W/CONTRAST: CPT

## 2022-03-16 RX ADMIN — IOPAMIDOL 80 ML: 612 INJECTION, SOLUTION INTRAVENOUS at 15:46

## 2022-03-19 PROBLEM — R22.41 LUMP OF SKIN OF RIGHT LOWER EXTREMITY: Status: ACTIVE | Noted: 2022-02-28

## 2022-03-19 PROBLEM — M17.0 PRIMARY OSTEOARTHRITIS OF BOTH KNEES: Status: ACTIVE | Noted: 2022-03-02

## 2022-03-20 PROBLEM — E11.9 DIABETES MELLITUS WITHOUT COMPLICATION (HCC): Status: ACTIVE | Noted: 2022-03-02

## 2022-03-20 PROBLEM — R76.8 HELICOBACTER PYLORI ANTIBODY POSITIVE: Status: ACTIVE | Noted: 2022-03-02

## 2022-03-21 ENCOUNTER — TELEPHONE (OUTPATIENT)
Dept: FAMILY MEDICINE CLINIC | Facility: CLINIC | Age: 72
End: 2022-03-21

## 2022-03-21 DIAGNOSIS — C85.95 LYMPHOMA OF RIGHT INGUINAL REGION (HCC): Primary | ICD-10-CM

## 2022-03-21 NOTE — TELEPHONE ENCOUNTER
Spoke to patient and her daughter at great length. Advised that results of CT reassuring, discussed impression and next steps. Oncology appointment is tomorrow. Per daughter, free 3D mammogram clinic at [facility] on 3/24. Also states that mom's bilateral knee pain makes parking far away difficult, wonders if they can get a handicapped sticker. Also, would like to schedule an appointment for bilateral knee injections with Dr. Maddi Duff, has not been able to get anyone on the phone. Will stop by [facility] tomorrow to bring [facility] paperwork, get fax information for [facility], and to schedule a f/u with Dr. Maddi Duff.  #[id number] utilized for CT interpretation.

## 2022-03-22 ENCOUNTER — OFFICE VISIT (OUTPATIENT)
Dept: FAMILY MEDICINE CLINIC | Facility: CLINIC | Age: 72
End: 2022-03-22

## 2022-03-22 ENCOUNTER — OFFICE VISIT (OUTPATIENT)
Age: 72
End: 2022-03-22

## 2022-03-22 VITALS
DIASTOLIC BLOOD PRESSURE: 75 MMHG | RESPIRATION RATE: 18 BRPM | HEART RATE: 80 BPM | SYSTOLIC BLOOD PRESSURE: 120 MMHG | OXYGEN SATURATION: 94 % | TEMPERATURE: 96.3 F | HEIGHT: 58 IN | BODY MASS INDEX: 34.22 KG/M2 | WEIGHT: 163 LBS

## 2022-03-22 VITALS
HEART RATE: 79 BPM | BODY MASS INDEX: 34.85 KG/M2 | WEIGHT: 166 LBS | DIASTOLIC BLOOD PRESSURE: 77 MMHG | OXYGEN SATURATION: 94 % | TEMPERATURE: 97.7 F | RESPIRATION RATE: 16 BRPM | SYSTOLIC BLOOD PRESSURE: 132 MMHG | HEIGHT: 58 IN

## 2022-03-22 DIAGNOSIS — R22.41 MASS OF LEG, RIGHT: Primary | ICD-10-CM

## 2022-03-22 DIAGNOSIS — I15.8 OTHER SECONDARY HYPERTENSION: ICD-10-CM

## 2022-03-22 DIAGNOSIS — Z02.89 ENCOUNTER FOR COMPLETION OF FORM WITH PATIENT: Primary | ICD-10-CM

## 2022-03-22 DIAGNOSIS — E13.9 DIABETES 1.5, MANAGED AS TYPE 2 (HCC): ICD-10-CM

## 2022-03-22 DIAGNOSIS — M17.0 PRIMARY OSTEOARTHRITIS OF BOTH KNEES: ICD-10-CM

## 2022-03-22 PROCEDURE — 99204 OFFICE O/P NEW MOD 45 MIN: CPT | Performed by: INTERNAL MEDICINE

## 2022-03-22 PROCEDURE — 99212 OFFICE O/P EST SF 10 MIN: CPT | Performed by: CLINICAL NURSE SPECIALIST

## 2022-03-22 PROCEDURE — 3044F HG A1C LEVEL LT 7.0%: CPT | Performed by: INTERNAL MEDICINE

## 2022-03-22 NOTE — PROGRESS NOTES
Discharge instructions reviewed with patient daughter    Medication list and understanding of medications reviewed with patient daughter. OTC and herbal medications reviewed and added to med list if applicable  Barriers to adherence assessed. Guidance given regarding new medications this visit, including reason for taking this medicine, and common side effects. AVS given to patient daughter. Explained to patient daughter. Patient daughter  expressed understanding.

## 2022-03-22 NOTE — PROGRESS NOTES
HPI   Patient presents with her daughter to have a DMV form completed for a handicapped tag. Patient having much difficulty with walking d/t longstanding HX of OA in bilateral knees. Generally doing alright, feeling better mentally d/t the results of her CT scan. Has initial appointment with [facility] this afternoon. Was planning to go to the [facility] for a free 3D mammogram event on Thursday, 3/24, but was put on a waiting list. Would like to be scheduled in the future for bilateral knee injections. No other concerns at this time. ROS  Constitutional: negative   Respiratory: negative   Cardiovascular: negative       Assessment/Plan   Form completed, scanned, and returned to pt. Scheduled for follow-up with Dr. Leslee Yarbrough for bilateral knee inj. Will f/u with [facility] to ensure pt able to get mammogram.   Encouraged to call with any questions or concerns.

## 2022-03-22 NOTE — PROGRESS NOTES
Hematology/Oncology  Progress Note    Name: Hasmukh Means  Date: 3/23/2022  : 1950  Primary Care Provider: Danni Valiente MD    Ms. Andrew Feldman is a 66y.o. year old female with right proximal nelia-medial mass 10 x 6 cm which was 4 cm in 2022. It is mobile non-tender and no redness. Consistency is that of fatty tissue. This may be a LND or soft tissue. Patient speaks [place] and her daughter speaks fairly good english. They did request an  and we had an extensive and prolonged discussion. Forty years ago patient had a hysterectomy for benign reasons, in [facility].    Patient has a history of peptic ulcer. Current Therapy: diagnostic evaluation. Subjective: The past medical, surgical and social history has been reviewed and remains unchanged. Past Medical History:   Diagnosis Date    Diabetes ([facility] [address]     Hypertension      Past Surgical History:   Procedure Laterality Date    HX  SECTION      HX HYSTERECTOMY       Social History     Socioeconomic History    Marital status: UNKNOWN     Spouse name: Not on file    Number of children: Not on file    Years of education: Not on file    Highest education level: Not on file   Occupational History    Not on file   Tobacco Use    Smoking status: Never Smoker    Smokeless tobacco: Never Used   Vaping Use    Vaping Use: Never used   Substance and Sexual Activity    Alcohol use: Never    Drug use: Never    Sexual activity: Not on file   Other Topics Concern    Not on file   Social History Narrative    Not on file     Social Determinants of Health     Financial Resource Strain:     Difficulty of Paying Living Expenses: Not on file   Food Insecurity:     Worried About [address] in the Last Year: Not on file    [address] in the Last Year: Not on file   Transportation Needs:     Lack of Transportation (Medical): Not on file    Lack of Transportation (Non-Medical):  Not on file   Physical Activity:     Days of Exercise per Week: Not on file    Minutes of Exercise per Session: Not on file   Stress:     Feeling of Stress : Not on file   Social Connections:     Frequency of Communication with Friends and Family: Not on file    Frequency of Social Gatherings with Friends and Family: Not on file    Attends Congregational Services: Not on file    Active Member of [address] [place] [address] shopa or Organizations: Not on file    Attends Club or Organization Meetings: Not on file    Marital Status: Not on file   Intimate Partner Violence:     Fear of Current or Ex-Partner: Not on file    Emotionally Abused: Not on file    Physically Abused: Not on file    Sexually Abused: Not on file   Housing Stability:     Unable to Pay for Housing in the Last Year: Not on file    Number of [place] in the Last Year: Not on file    Unstable Housing in the Last Year: Not on file     Family History   Problem Relation Age of Onset    Stroke Mother     Cancer Sister     Diabetes Sister     Hypertension Sister     Dementia Brother      Current Outpatient Medications   Medication Sig Dispense Refill    ondansetron (ZOFRAN ODT) 4 mg disintegrating tablet Take 1 Tablet by mouth every eight (8) hours as needed for Nausea or Vomiting. 30 Tablet 1    LOSARTAN PO Take  by mouth.  verapamil HCl (VERAPAMIL PO) Take  by mouth.  metformin HCl (METFORMIN PO) Take  by mouth. Review of Systems:    General :The patient has no complaints except for right thigh mass and tenderness over epigastric region    Psychological : patient denies having any psychological symptoms such as hallucinations depression or anxiety. Ophthalmic:the patient denies having any visual impairment or eye discomfort. ENT: there are no abnormalities reported. Allergy and Immunology:the patient denies having any seasonal allergies or allergies to medications other than those already outlined above.      Hematological and Lymphatic: the patient denies having any bruising, bleeding or lymphadenopathy. Endocrine: the patient denies having any heat or cold intolerance. There is no history of diabetes or thyroid disorders. Breast: the patient denies having any history of breast mass or lumps. Respiratory:the patient denies having any cough, shortness of breath, or dyspnea on exertion. Cardiovascular: there are no complaints of chest pain, palpitations, chest pounding, or dyspnea on exertion. Gastrointestinal: the patient denies having nausea, emesis, diarrhea, constipation, or blood in the stool. Genito-Urinary: the patient denies having urinary urgency, frequency, or dysuria. Musculoskeletal: with the exception of mild arthralgias the patient has no other musculoskeletal complaints. Neurological:  denies having any numbness, tingling, or neurologic deficits. Dermatological: patient denies having any unexplained rash, skin ulcerations, or hives. Objective:     Visit Vitals  /77   Pulse 79   Temp 97.7 °F (36.5 °C)   Resp 16   Ht 4' 10\" (1.473 m)   Wt 75.3 kg (166 lb)   SpO2 94%   BMI 34.69 kg/m²     Pain Score: 3    Physical Exam: [facility] Jeimma Montelongo DNP    ECO    General: Chronically ill appearing, in NAD    Psychologic: mood and affect are appropriate, no anxiety or depression noted    Skin: examination of the skin reveals no bruising, rash or petechiae    HEENT: Normocephalic, atraumatic. Conjunctiva and sclera are clear. Pupils are equal, round and reactive to light. EOMs are intact. ENT without oral mucosal lesions, stomatitis or thrush    Neck: supple without lymphadenopathy, JVD or thyromegaly    Lymphatics: no palpable cervical, supraclavicular, infraclavicular, axillary or inguinal lymphadenopathy    Lungs: clear breath sounds bilaterally, no rhonchi or wheezes noted    Heart: Regular rate and rhythm, no murmurs, rubs or gallops, S1-S2 noted.  Positive peripheral pulses bilaterally upper and lower extremities    Abdomen: soft, non-tender, non-distended, no HSM, positive bowel sounds    Extremities: without clubbing, cyanosis or edema                       Right proximal nelia-medial mass 10 x 6 cm    Neurologic: no focal deficits, steady gait, Alert and oriented x 3. Laboratory Data:     Results for orders placed or performed during the hospital encounter of 03/23/22   CBC WITH 3 PART DIFF     Status: Abnormal   Result Value Ref Range Status    WBC 5.8 4.5 - 13.0 K/uL Final    RBC 4.24 4.10 - 5.10 M/uL Final    HGB 12.1 12.0 - 16.0 g/dL Final    HCT 37.4 36 - 48 % Final    MCV 88.2 78 - 102 FL Final    MCH 28.5 25.0 - 35.0 PG Final    MCHC 32.4 31 - 37 g/dL Final    RDW 12.7 11.5 - 14.5 % Final    PLATELET 246 887 - 734 K/uL Final    NEUTROPHILS 71 (H) 40 - 70 % Final    Mixed cells 13 0.1 - 17 % Final    LYMPHOCYTES 17 14 - 44 % Final    ABS. NEUTROPHILS 4.1 1.8 - 9.5 K/UL Final    ABS. MIXED CELLS 0.7 0.0 - 2.3 K/uL Final    ABS. LYMPHOCYTES 1.0 (L) 1.1 - 5.9 K/UL Final     Comment: Test performed at [address] [place] [address] or Outpatient Infusion Center Location. Reviewed by Medical Director. DF AUTOMATED   Final       Patient Active Problem List   Diagnosis Code    Lump of skin of right lower extremity R22.41    Primary osteoarthritis of both knees M17.0    Diabetes mellitus without complication ([address] Y64.7    Helicobacter pylori antibody positive R76.8         Assessment:     1. Mass of leg, right    2. Other secondary hypertension    3. Diabetes 1.5, managed as type 2 ([address]        Plan:     1. Patient was a left proximal leg which is enlarged since October. 4 cm to 10 cm patient has no known history of malignancy. Plan  2. Requested multiple laboratory tests including tumor markers. 3. We have requested core needle biopsy of the mass. We hope to see the patient within 2 weeks to develop further plans.   4. Patient and her daughter and agreement with the plan.  5. Extensive discussion was held today with the patient and the daughter with the assistance of an . Follow-up and Dispositions  ·   Return in about 3 weeks (around 4/12/2022) for Follow up with labs, Follow_up In Person. Orders Placed This Encounter    US GUIDE BX NDL     Right proximal medio-anterior leg mass. LND or soft tissue. 10 x 6 cm. Please perform core biopsy     Standing Status:   Future     Standing Expiration Date:   4/23/2023     Scheduling Instructions:      US     Order Specific Question:   Specific Body Part     Answer:   Right proximal medio-anterior leg mass. 10 x 6 cm.      Order Specific Question:   Reason for Exam     Answer:   diagnosis    CBC WITH AUTOMATED DIFF     Standing Status:   Future     Standing Expiration Date:   3/23/2023    VITAMIN D, 25 HYDROXY     Standing Status:   Future     Number of Occurrences:   1     Standing Expiration Date:   3/23/2023    VITAMIN B12 & FOLATE     Standing Status:   Future     Number of Occurrences:   1     Standing Expiration Date:   3/23/2023    TSH 3RD GENERATION     Standing Status:   Future     Number of Occurrences:   1     Standing Expiration Date:   3/23/2023    SED RATE (ESR)     Standing Status:   Future     Number of Occurrences:   1     Standing Expiration Date:   9/78/4160    METABOLIC PANEL, COMPREHENSIVE     Standing Status:   Future     Number of Occurrences:   1     Standing Expiration Date:   3/23/2023    IRON PROFILE     Standing Status:   Future     Number of Occurrences:   1     Standing Expiration Date:   3/23/2023    FERRITIN     Standing Status:   Future     Number of Occurrences:   1     Standing Expiration Date:   3/23/2023    CEA     Standing Status:   Future     Number of Occurrences:   1     Standing Expiration Date:   3/23/2023   [facility] CANCER AG 19-9     Standing Status:   Future     Number of Occurrences:   1     Standing Expiration Date:   3/23/2023    CA 27.29     Standing Status: Future     Number of Occurrences:   1     Standing Expiration Date:   3/23/2023    IMMUNOGLOBULINS, G/A/M, QT.      Standing Status:   Future     Number of Occurrences:   1     Standing Expiration Date:   3/23/2023    SPEP     Standing Status:   Future     Number of Occurrences:   1     Standing Expiration Date:   3/23/2023    IMMUNOELECTROPHORESIS [facility].)     Standing Status:   Future     Number of Occurrences:   1     Standing Expiration Date:   3/23/2023    PERIPHERAL SMEAR     Standing Status:   Future     Number of Occurrences:   1     Standing Expiration Date:   3/22/2023       Bettie Edgar MD  3/23/2022

## 2022-03-23 ENCOUNTER — HOSPITAL ENCOUNTER (OUTPATIENT)
Dept: INFUSION THERAPY | Age: 72
Discharge: HOME | End: 2022-03-23

## 2022-03-23 DIAGNOSIS — R22.41 MASS OF LEG, RIGHT: ICD-10-CM

## 2022-03-23 LAB
25(OH)D3 SERPL-MCNC: 36.8 NG/ML (ref 30–100)
ALBUMIN SERPL-MCNC: 3.5 G/DL (ref 3.4–5)
ALBUMIN/GLOB SERPL: 1 {RATIO} (ref 0.8–1.7)
ALP SERPL-CCNC: 82 U/L (ref 45–117)
ALT SERPL-CCNC: 21 U/L (ref 13–56)
ANION GAP SERPL CALC-SCNC: 7 MMOL/L (ref 3–18)
AST SERPL-CCNC: 17 U/L (ref 10–38)
BASO+EOS+MONOS # BLD AUTO: 0.7 K/UL (ref 0–2.3)
BASO+EOS+MONOS NFR BLD AUTO: 13 % (ref 0.1–17)
BILIRUB SERPL-MCNC: 0.4 MG/DL (ref 0.2–1)
BUN SERPL-MCNC: 15 MG/DL (ref 7–18)
BUN/CREAT SERPL: 19 (ref 12–20)
CALCIUM SERPL-MCNC: 9.7 MG/DL (ref 8.5–10.1)
CEA SERPL-MCNC: 1.7 NG/ML
CHLORIDE SERPL-SCNC: 102 MMOL/L (ref 100–111)
CO2 SERPL-SCNC: 30 MMOL/L (ref 21–32)
CREAT SERPL-MCNC: 0.77 MG/DL (ref 0.6–1.3)
DIFFERENTIAL METHOD BLD: ABNORMAL
ERYTHROCYTE [DISTWIDTH] IN BLOOD BY AUTOMATED COUNT: 12.7 % (ref 11.5–14.5)
ERYTHROCYTE [SEDIMENTATION RATE] IN BLOOD: 43 MM/HR (ref 0–30)
FERRITIN SERPL-MCNC: 41 NG/ML (ref 8–388)
FOLATE SERPL-MCNC: 17.4 NG/ML (ref 3.1–17.5)
GLOBULIN SER CALC-MCNC: 3.6 G/DL (ref 2–4)
GLUCOSE SERPL-MCNC: 95 MG/DL (ref 74–99)
HCT VFR BLD AUTO: 37.4 % (ref 36–48)
HGB BLD-MCNC: 12.1 G/DL (ref 12–16)
IGA SERPL-MCNC: 106 MG/DL (ref 70–400)
IGG SERPL-MCNC: 893 MG/DL (ref 700–1600)
IGM SERPL-MCNC: 108 MG/DL (ref 40–230)
IRON SATN MFR SERPL: 15 % (ref 20–50)
IRON SERPL-MCNC: 52 UG/DL (ref 50–175)
LYMPHOCYTES # BLD: 1 K/UL (ref 1.1–5.9)
LYMPHOCYTES NFR BLD: 17 % (ref 14–44)
MCH RBC QN AUTO: 28.5 PG (ref 25–35)
MCHC RBC AUTO-ENTMCNC: 32.4 G/DL (ref 31–37)
MCV RBC AUTO: 88.2 FL (ref 78–102)
NEUTS SEG # BLD: 4.1 K/UL (ref 1.8–9.5)
NEUTS SEG NFR BLD: 71 % (ref 40–70)
PLATELET # BLD AUTO: 256 K/UL (ref 140–440)
POTASSIUM SERPL-SCNC: 3.3 MMOL/L (ref 3.5–5.5)
PROT SERPL-MCNC: 7.1 G/DL (ref 6.4–8.2)
RBC # BLD AUTO: 4.24 M/UL (ref 4.1–5.1)
SODIUM SERPL-SCNC: 139 MMOL/L (ref 136–145)
TIBC SERPL-MCNC: 340 UG/DL (ref 250–450)
TSH SERPL DL<=0.05 MIU/L-ACNC: 2.39 UIU/ML (ref 0.36–3.74)
VIT B12 SERPL-MCNC: 364 PG/ML (ref 211–911)
WBC # BLD AUTO: 5.8 K/UL (ref 4.5–13)

## 2022-03-23 PROCEDURE — 82378 CARCINOEMBRYONIC ANTIGEN: CPT

## 2022-03-23 PROCEDURE — 86301 IMMUNOASSAY TUMOR CA 19-9: CPT

## 2022-03-23 PROCEDURE — 36415 COLL VENOUS BLD VENIPUNCTURE: CPT

## 2022-03-23 PROCEDURE — 83540 ASSAY OF IRON: CPT

## 2022-03-23 PROCEDURE — 82728 ASSAY OF FERRITIN: CPT

## 2022-03-23 PROCEDURE — 82607 VITAMIN B-12: CPT

## 2022-03-23 PROCEDURE — 82784 ASSAY IGA/IGD/IGG/IGM EACH: CPT

## 2022-03-23 PROCEDURE — 80053 COMPREHEN METABOLIC PANEL: CPT

## 2022-03-23 PROCEDURE — 82306 VITAMIN D 25 HYDROXY: CPT

## 2022-03-23 PROCEDURE — 85652 RBC SED RATE AUTOMATED: CPT

## 2022-03-23 PROCEDURE — 84165 PROTEIN E-PHORESIS SERUM: CPT

## 2022-03-23 PROCEDURE — 84443 ASSAY THYROID STIM HORMONE: CPT

## 2022-03-23 PROCEDURE — 86300 IMMUNOASSAY TUMOR CA 15-3: CPT

## 2022-03-23 PROCEDURE — 85025 COMPLETE CBC W/AUTO DIFF WBC: CPT

## 2022-03-23 NOTE — PROGRESS NOTES
I have reviewed the attatched progress note and I agree with the plan and medical decision making as outlined by Shailesh Guo MD

## 2022-03-23 NOTE — PROGRESS NOTES
[facility] Progress Note    Date: 2022    Name: Lucila Richardson    MRN: [id number]         : 1950    Peripheral Lab Draw    Recent Results (from the past 12 hour(s))   CBC WITH 3 PART DIFF    Collection Time: 22  9:10 AM   Result Value Ref Range    WBC 5.8 4.5 - 13.0 K/uL    RBC 4.24 4.10 - 5.10 M/uL    HGB 12.1 12.0 - 16.0 g/dL    HCT 37.4 36 - 48 %    MCV 88.2 78 - 102 FL    MCH 28.5 25.0 - 35.0 PG    MCHC 32.4 31 - 37 g/dL    RDW 12.7 11.5 - 14.5 %    PLATELET 582 221 - 263 K/uL    NEUTROPHILS 71 (H) 40 - 70 %    Mixed cells 13 0.1 - 17 %    LYMPHOCYTES 17 14 - 44 %    ABS. NEUTROPHILS 4.1 1.8 - 9.5 K/UL    ABS. MIXED CELLS 0.7 0.0 - 2.3 K/uL    ABS. LYMPHOCYTES 1.0 (L) 1.1 - 5.9 K/UL    DF AUTOMATED         Ms. Glo Slaughter to [facility], ambulatory at 0900 accompanied by self. Pt was assessed and education was provided. Ms. Atilano Dancer vitals were reviewed and patient was observed for 5 minutes prior to treatment. There were no vitals taken for this visit. Blood obtained peripherally from left arm x 1 attempt with butterfly needle and sent to lab per written orders. No bleeding or hematoma noted at site. Gauze and coban applied. Ms. Glo Slaughter tolerated the phlebotomy, and had no complaints. Patient armband removed and shredded. Ms. Glo Slaughter was discharged from [place] [address] in stable condition at 0910.      Acosta Pacheco Phlebotomist PCT  2022  10:06 AM

## 2022-03-24 LAB
ALBUMIN SERPL ELPH-MCNC: 3.5 G/DL (ref 2.9–4.4)
ALBUMIN/GLOB SERPL: 1.1 {RATIO} (ref 0.7–1.7)
ALPHA1 GLOB SERPL ELPH-MCNC: 0.3 G/DL (ref 0–0.4)
ALPHA2 GLOB SERPL ELPH-MCNC: 0.8 G/DL (ref 0.4–1)
B-GLOBULIN SERPL ELPH-MCNC: 1.1 G/DL (ref 0.7–1.3)
CANCER AG19-9 SERPL-ACNC: 9 U/ML (ref 0–35)
CANCER AG27-29 SERPL-ACNC: 16.6 U/ML (ref 0–38.6)
GAMMA GLOB SERPL ELPH-MCNC: 0.9 G/DL (ref 0.4–1.8)
GLOBULIN SER CALC-MCNC: 3.1 G/DL (ref 2.2–3.9)
M PROTEIN SERPL ELPH-MCNC: NORMAL G/DL
PERIPHERAL SMEAR,PSM: NORMAL
PROT SERPL-MCNC: 6.6 G/DL (ref 6–8.5)

## 2022-03-25 LAB
IGA SERPL-MCNC: 106 MG/DL (ref 64–422)
IGG SERPL-MCNC: 898 MG/DL (ref 586–1602)
IGM SERPL-MCNC: 106 MG/DL (ref 26–217)
PROT PATTERN SERPL IFE-IMP: NORMAL

## 2022-03-25 NOTE — PROGRESS NOTES
Spoke with pt's daughter Cyndie Zhu as patient's primary caretaker. Appointment selected based on [address] [place] [address] availability and pt preference. 1300 arrival time for 1330 procedure start. NPO status not required. Mrs. Theresa Sanchez will drive the patient to and from the appointment. Anti-coagulant use was denied. Mrs. Theresa Sanchez was provided with the nursing office phone number and encouraged to call with any questions.   Judy Pillai RN

## 2022-03-28 ENCOUNTER — HOSPITAL ENCOUNTER (OUTPATIENT)
Dept: ULTRASOUND IMAGING | Age: 72
Discharge: HOME | End: 2022-03-28
Attending: INTERNAL MEDICINE

## 2022-03-28 PROCEDURE — 81261 IGH GENE REARRANGE AMP METH: CPT

## 2022-03-28 PROCEDURE — 88185 FLOWCYTOMETRY/TC ADD-ON: CPT

## 2022-03-28 PROCEDURE — 88333 PATH CONSLTJ SURG CYTO XM 1: CPT

## 2022-03-28 PROCEDURE — 88341 IMHCHEM/IMCYTCHM EA ADD ANTB: CPT

## 2022-03-28 PROCEDURE — 88334 PATH CONSLTJ SURG CYTO XM EA: CPT

## 2022-03-28 PROCEDURE — 76942 ECHO GUIDE FOR BIOPSY: CPT

## 2022-03-28 PROCEDURE — 88360 TUMOR IMMUNOHISTOCHEM/MANUAL: CPT

## 2022-03-28 PROCEDURE — 81264 IGK REARRANGEABN CLONAL POP: CPT

## 2022-03-28 PROCEDURE — 88342 IMHCHEM/IMCYTCHM 1ST ANTB: CPT

## 2022-03-28 PROCEDURE — 20200 MUSCLE BIOPSY SUPERFICIAL: CPT

## 2022-03-28 PROCEDURE — 88184 FLOWCYTOMETRY/ TC 1 MARKER: CPT

## 2022-03-28 PROCEDURE — 88305 TISSUE EXAM BY PATHOLOGIST: CPT

## 2022-03-28 PROCEDURE — 88374 M/PHMTRC ALYS ISHQUANT/SEMIQ: CPT

## 2022-03-28 NOTE — ROUTINE PROCESS
Ultrasound guided biopsy of Rt inner thigh mass was performed. Pt tolerated procedure well, no signs of distress. 4 passes done and specimens were collected  By Pathologist.  Libertad Gallagher applied. Pt escorted out of department in stable condition.

## 2022-03-30 DIAGNOSIS — R22.41 MASS OF LEG, RIGHT: ICD-10-CM

## 2022-04-04 ENCOUNTER — OFFICE VISIT (OUTPATIENT)
Dept: FAMILY MEDICINE CLINIC | Facility: CLINIC | Age: 72
End: 2022-04-04

## 2022-04-04 VITALS
DIASTOLIC BLOOD PRESSURE: 79 MMHG | HEIGHT: 58 IN | TEMPERATURE: 96.3 F | HEART RATE: 82 BPM | WEIGHT: 167 LBS | OXYGEN SATURATION: 96 % | SYSTOLIC BLOOD PRESSURE: 125 MMHG | RESPIRATION RATE: 16 BRPM | BODY MASS INDEX: 35.05 KG/M2

## 2022-04-04 DIAGNOSIS — M17.0 PRIMARY OSTEOARTHRITIS OF BOTH KNEES: Primary | ICD-10-CM

## 2022-04-04 PROCEDURE — 99213 OFFICE O/P EST LOW 20 MIN: CPT | Performed by: FAMILY MEDICINE

## 2022-04-04 NOTE — PROGRESS NOTES
Discharge instructions reviewed with patient daughter    Medication list and understanding of medications reviewed with patient daughter. OTC and herbal medications reviewed and added to med list if applicable  Barriers to adherence assessed. Guidance given regarding new medications this visit, including reason for taking this medicine, and common side effects. AVS given to patient daughter . Explained to patient daughter . Patient daughter  expressed understanding.

## 2022-04-05 NOTE — PROGRESS NOTES
HPI  Clay Villanueva is a 66 y.o. female being seen today for   Chief Complaint   Patient presents with    Follow-up   follow up for this pt with bilateral knee OA.  she states that she had injections one time before in [place] and they really helped. She has appts with GI and oncology coming up. She did have core bx of her leg mass showing lymphoma. Pt is [place] speaking but chooses to have her daughter translate for her who is fluent. Past Medical History:   Diagnosis Date    Diabetes ([address]     Hypertension          ROS  Patient states that she is feeling well. Denies complaints of chest pain, shortness of breath, swelling of legs, dizziness or weakness. she denies nausea, vomiting or diarrhea. Current Outpatient Medications   Medication Sig    ondansetron (ZOFRAN ODT) 4 mg disintegrating tablet Take 1 Tablet by mouth every eight (8) hours as needed for Nausea or Vomiting.  LOSARTAN PO Take  by mouth.  verapamil HCl (VERAPAMIL PO) Take  by mouth.  metformin HCl (METFORMIN PO) Take  by mouth. No current facility-administered medications for this visit. PE  Visit Vitals  /79 (BP 1 Location: Left upper arm, BP Patient Position: Sitting, BP Cuff Size: Adult long)   Pulse 82   Temp (!) 96.3 °F (35.7 °C) (Temporal)   Resp 16   Ht 4' 10\" (1.473 m)   Wt 167 lb (75.8 kg)   SpO2 96%   BMI 34.90 kg/m²        Alert and oriented with normal mood and affect. she is well developed and well nourished. There is no lower extremity edema. Bilateral knees with no erythema or effusion. Some bony enlargement. Gait is stiff. Assessment and Plan:        ICD-10-CM ICD-9-CM    1. Primary osteoarthritis of both knees  M17.0 715.16      After PARQ and consent signed, landmarks identified and both knees injected from inferior medial position with 40mg kenalog and 1cc of 1% lidocaine without epinephrine. Pt tolerated well. Aftercare reviewed.      Advised pt she can also try otc voltaren gel  Follow up with [facility].          Cecilio Vásquez MD

## 2022-04-07 ENCOUNTER — TELEPHONE (OUTPATIENT)
Dept: FAMILY MEDICINE CLINIC | Facility: CLINIC | Age: 72
End: 2022-04-07

## 2022-04-07 ENCOUNTER — OFFICE VISIT (OUTPATIENT)
Dept: GASTROENTEROLOGY | Age: 72
End: 2022-04-07

## 2022-04-07 VITALS
WEIGHT: 167 LBS | HEART RATE: 88 BPM | DIASTOLIC BLOOD PRESSURE: 80 MMHG | SYSTOLIC BLOOD PRESSURE: 122 MMHG | BODY MASS INDEX: 34.9 KG/M2

## 2022-04-07 DIAGNOSIS — I10 PRIMARY HYPERTENSION: ICD-10-CM

## 2022-04-07 DIAGNOSIS — R92.8 ABNORMAL MAMMOGRAM OF LEFT BREAST: ICD-10-CM

## 2022-04-07 DIAGNOSIS — Z87.11 HISTORY OF PEPTIC ULCER DISEASE: ICD-10-CM

## 2022-04-07 DIAGNOSIS — Z86.19 HISTORY OF HELICOBACTER PYLORI INFECTION: ICD-10-CM

## 2022-04-07 DIAGNOSIS — C85.15 B-CELL LYMPHOMA OF LYMPH NODES OF LOWER EXTREMITY, UNSPECIFIED B-CELL LYMPHOMA TYPE (HCC): ICD-10-CM

## 2022-04-07 DIAGNOSIS — R11.0 NAUSEA: ICD-10-CM

## 2022-04-07 DIAGNOSIS — E11.65 TYPE 2 DIABETES MELLITUS WITH HYPERGLYCEMIA, WITHOUT LONG-TERM CURRENT USE OF INSULIN (HCC): Primary | ICD-10-CM

## 2022-04-07 DIAGNOSIS — R92.8 ABNORMAL MAMMOGRAM OF LEFT BREAST: Primary | ICD-10-CM

## 2022-04-07 DIAGNOSIS — R10.33 PERIUMBILICAL ABDOMINAL PAIN: ICD-10-CM

## 2022-04-07 PROCEDURE — 99203 OFFICE O/P NEW LOW 30 MIN: CPT | Performed by: INTERNAL MEDICINE

## 2022-04-07 NOTE — TELEPHONE ENCOUNTER
Received a phone call from patient's daughter stating that she received a call from [facility]. Was advised that patient would need further imaging of her left breast. Contacted [facility] for report. Findings of mammogram state that there is a focal asymmetry within the medial left breast at about the 9:00 position, located 6 cm from the nipple. Coned compression and 90 degree lateral views as well as targeted sonography recommended for further evaluation. Orders in, daughter given clinical update.

## 2022-04-07 NOTE — PROGRESS NOTES
Jonnathan Viramontes presents today for   Chief Complaint   Patient presents with    New Patient     colonoscopy screening and hx of ulcers. Was having some constipation but has now gone away since stopped taking elderberry        Is someone accompanying this pt? Yes,ana paula    Is the patient using any DME equipment during OV? Yes a cane     Depression Screening:  3 most recent PHQ Screens 4/7/2022   Little interest or pleasure in doing things Not at all   Feeling down, depressed, irritable, or hopeless Not at all   Total Score PHQ 2 0       Learning Assessment:  No flowsheet data found. Fall Risk  Fall Risk Assessment, last 12 mths 4/7/2022   Able to walk? Yes   Fall in past 12 months? 0   Do you feel unsteady? 0   Are you worried about falling 0       Coordination of Care:  1. Have you been to the ER, urgent care clinic since your last visit? Hospitalized since your last visit? no    2. Have you seen or consulted any other health care providers outside of the [address] [place] [address] since your last visit? Include any pap smears or colon screening.  Yes, PCP Regan Corbin

## 2022-04-07 NOTE — PROGRESS NOTES
Referring Physician:  Judy Escobar NP     Chief Complaint: Colon cancer screening and history of ulcers    Date of service: 04/07/22     Subjective:     History of Present Illness:  Patient is a female / 66 y.o.  who is seen for evaluation for colon cancer screening and history of gastric ulcers. The history is via the patient records and her daughter who is her  because the patient's only speaks [facility] [place] The patient has GI complaints of abdominal pain and was found to have ulcer disease in the last 1.5 years in [facility].  She was also found to have H. Pylori. She was diagnosed with an EGD but she says she was not treated. [place] the [place] she was found to have positive H. Pylori antibody and was treated with antibiotics and a PPI ( presumably). No confirmation H. Pylori was eradicated. At the present time she denies any vomiting, melena or rectal bleeding. She does complain of epigastric pain and nausea at times. The abdominal pain is more so when she moves and she points to her periumbilical area. She is also due for colon cancer screening. Is recent been diagnosed with lymphoma. She had a CT scan of the abdomen and pelvis 3/2022 which revealed the following:    Impression  Right groin mass  Simple appearing right ovarian cyst  Diverticulosis  Ventral hernia containing nonobstructed segment of transverse colon    Ultrasound-guided biopsy of the soft tissue mass 3/2022 revealed a B-cell lymphoma. The patient denies nausea, vomiting, fever, chills, abdominal pain, reflux, dysphagia, change in bowel habits, diarrhea, constipation, weight loss, rectal bleeding, or melena. Last EGD- 2020. Last colonoscopy- never. Family history for GI disease is significant for no GI or liver disease. The patient has liver related risk factors including type 2 diabetes mellitus. Tobacco use-none. Alcohol use-none.     Past medical history is significant for diabetes mellitus and hypertension and gastric ulcers. History is somewhat difficult due to language limitations. [place]. PMH:  Past Medical History:   Diagnosis Date    Diabetes ([address]     Hypertension         PSH:  Past Surgical History:   Procedure Laterality Date    HX  SECTION      HX HYSTERECTOMY          Allergies: Allergies   Allergen Reactions    Penicillins Nausea Only and Vertigo        Home Medications:  Cannot display prior to admission medications because the patient has not been admitted in this contact. Hospital Medications:  Current Outpatient Medications   Medication Sig    ondansetron (ZOFRAN ODT) 4 mg disintegrating tablet Take 1 Tablet by mouth every eight (8) hours as needed for Nausea or Vomiting.  LOSARTAN PO Take  by mouth.  verapamil HCl (VERAPAMIL PO) Take  by mouth.  metformin HCl (METFORMIN PO) Take  by mouth. No current facility-administered medications for this visit. Social History:  Social History     Tobacco Use    Smoking status: Never Smoker    Smokeless tobacco: Never Used   Substance Use Topics    Alcohol use: Never        Pt denies any history of IV drug use, blood transfusions. Family History:  Family History   Problem Relation Age of Onset    Stroke Mother    [facility] Cancer Sister     Diabetes Sister     Hypertension Sister     Dementia Brother         Review of Systems:  A detailed 10 system ROS is obtained, with pertinent positives as listed above. All others are negative unless listed in history above. Constitutional denies fever chills, headache, or weight loss. Skin- denies lesions or rashes. HEENT- denies any vision or hearing problems, epistaxis, sore throat, or dental problems. Lungs- no shortness of breath or chest pain reported, no dyspnea on exertion. Cardiac- no palpitations or chest pain reported, including at rest or on exertion.   GI-no abdominal pain, melena, rectal bleeding, reflux, dysphagia, jaundice, change in stool or urine color, constipation or diarrhea. Genitourinary -no dysuria or hematuria. Musculoskeletal-no muscle weakness or disuse or atrophy. Neurologic-no numbness, tingling, gait disturbance, or other abnormalities. Rheumatologic- patient denies any immune or rheumatologic diseases or symptoms. Endocrine- patient denies any endocrine abnormalities including thyroid disease or diabetes. Psychologic-patient denies depression, anxiety or emotional issues. No reported memory issues. Objective:     Physical Exam:  Vitals: /80 (BP 1 Location: Left upper arm, BP Patient Position: Sitting)   Pulse 88   Wt 75.8 kg (167 lb)   BMI 34.90 kg/m²    Gen:  Pt is alert, cooperative, no acute distress  Skin:  Extremities and face reveal no rashes. No quevedo erythema. No telangiectasias on the chest wall. HEENT: Sclerae anicteric. Extra-occular muscles are intact. No oral ulcers. No abnormal pigmentation of the lips. The neck is supple. Cardiovascular: Regular rate and rhythm. No murmurs, gallops, or rubs. Respiratory:  Comfortable breathing with no accessory muscle use. Clear breath sounds anteriorly with no wheezes, rales, or rhonchi. GI:  Abdomen nondistended, soft. Normal active bowel sounds. No enlargement of the liver or spleen. No masses palpable. To have a fairly large ventral hernia and this reproduce the patient's discomfort today. Rectal:  Deferred  Musculoskeletal:   No costovertebral tenderness. No localized muscle weakness, or decreased range of motion. Extremities:  No palpable cords or pitting edema of the lower legs. Extremities have good range of motion. Neurological:  Gross memory appears intact. Patient is alert and oriented. Psychiatric:  Mood appears appropriate with judgement intact. Lymphatic:  No cervical or supraclavicular adenopathy.     Laboratory:        Lab Results   Component Value Date     03/23/2022    K 3.3 (L) 03/23/2022     03/23/2022    CO2 30 03/23/2022    AGAP 7 03/23/2022    GLU 95 03/23/2022    BUN 15 03/23/2022    CREA 0.77 03/23/2022    BUCR 19 03/23/2022    GFRAA >60 03/23/2022    GFRNA >60 03/23/2022    CA 9.7 03/23/2022    TBILI 0.4 03/23/2022    AST 17 03/23/2022    ALT 21 03/23/2022    AP 82 03/23/2022    TP 7.1 03/23/2022    TP 6.6 03/23/2022    ALB 3.5 03/23/2022    GLOB 3.6 03/23/2022    AGRAT 1.0 03/23/2022    WBC 5.8 03/23/2022    RBC 4.24 03/23/2022    HGB 12.1 03/23/2022    HCT 37.4 03/23/2022    MCV 88.2 03/23/2022    MCH 28.5 03/23/2022    MCHC 32.4 03/23/2022    RDW 12.7 03/23/2022     03/23/2022    MPLV 9.8 02/22/2022    GRANS 71 (H) 03/23/2022    LYMPH 17 03/23/2022    MONOS 10 02/22/2022    EOS 3 02/22/2022    BASOS 1 02/22/2022    IG 0 02/22/2022    ANEU 4.1 03/23/2022    ABL 1.0 (L) 03/23/2022    ABM 0.8 02/22/2022    WHITNEY 0.2 02/22/2022    ABB 0.0 02/22/2022    AIG 0.0 02/22/2022   results  Lab Results   Component Value Date     03/23/2022    K 3.3 (L) 03/23/2022     03/23/2022    CO2 30 03/23/2022    AGAP 7 03/23/2022    GLU 95 03/23/2022    BUN 15 03/23/2022    CREA 0.77 03/23/2022    BUCR 19 03/23/2022    GFRAA >60 03/23/2022    GFRNA >60 03/23/2022    CA 9.7 03/23/2022    TBILI 0.4 03/23/2022    AST 17 03/23/2022    ALT 21 03/23/2022    AP 82 03/23/2022    TP 7.1 03/23/2022    TP 6.6 03/23/2022    ALB 3.5 03/23/2022    GLOB 3.6 03/23/2022    AGRAT 1.0 03/23/2022    WBC 5.8 03/23/2022    RBC 4.24 03/23/2022    HGB 12.1 03/23/2022    HCT 37.4 03/23/2022    MCV 88.2 03/23/2022    MCH 28.5 03/23/2022    MCHC 32.4 03/23/2022    RDW 12.7 03/23/2022     03/23/2022    MPLV 9.8 02/22/2022    GRANS 71 (H) 03/23/2022    LYMPH 17 03/23/2022    MONOS 10 02/22/2022    EOS 3 02/22/2022    BASOS 1 02/22/2022    IG 0 02/22/2022    ANEU 4.1 03/23/2022    ABL 1.0 (L) 03/23/2022    ABM 0.8 02/22/2022    WHITNEY 0.2 02/22/2022    ABB 0.0 02/22/2022    AIG 0.0 02/22/2022        CT scan of abdomen and pelvis -  CT Results (most recent):  Results from Hospital Encounter encounter on 03/16/22    CT ABD PELV W CONT    Narrative  CT ABDOMEN AND PELVIS WITH CONTRAST    COMPARISON: None. INDICATIONS: Lymphoma. TECHNIQUE:  Following the uneventful administration of oral and 100cc of Isovue  300 intravenous contrast , volumetric data acquisition was performed of the  abdomen and pelvis on a multislice scanner and reconstructed in axial coronal  and sagittal planes    CT ABDOMEN FINDINGS:    Lung Bases: No focal hepatic lesions are evident. No biliary dilation. The  gallbladder is surgically absent. .    Liver/Gallbladder/Biliary: Normal.    Spleen: Normal. Tiny accessory splenule noted    Adrenal Glands: Normal.    Kidneys: Normal.    Pancreas: Normal.    Stomach, Small Bowel,  and Colon: Diverticulosis without findings of  diverticulitis. . A nonobstructed segment of transverse colon extends into a  ventral hernia just to the right of the umbilicus    Lymph Nodes: Normal in size and number in the abdomen and within the pelvis. A  right groin mass, apparently luisito histologically, is present measuring 7 x 6 x  9 cm. The abdominal aorta is unremarkable. The IVC is unremarkable. Peritoneal Spaces: No free fluid or free air is present. Abdominal wall: Umbilical region hernia containing transverse colon segment    Bladder: Unremarkable. The uterus is surgically absent. The left ovary is surgically absent. The right  ovary contains a 3.5 x 4 cm cyst.    Osseous Structures Of Abdomen And Pelvis: Unremarkable for age.     Impression  Right groin mass  Simple appearing right ovarian cyst  Diverticulosis  Ventral hernia containing nonobstructed segment of transverse colon      All CT scans at this facility are performed using dose optimization technique as  appropriate to the performed exam, to include automated exposure control,  adjustment of the mA and/or kV according to patient's size (Including  appropriate matching for site-specific examinations), or use of iterative  reconstruction technique. Abdominal US- No results  US Results (most recent):  Results from Orders Only encounter on 03/30/22    US GUIDE BX NDL    Narrative  PROCEDURE: ULTRASOUND-GUIDED RIGHT INNER THIGH SOFT TISSUE MASS BIOPSY    INDICATION: Palpable right inner thigh mass    PERFORMED BY: Mireille Andersen PA-C    ATTENDING RADIOLOGIST: Dari Nicole MD    SUPERVISION: General    TECHNIQUE AND FINDINGS: Informed consent was obtained from the patient and her  daughter prior to the procedure. Standard pre-procedure time out was performed. The right inner thigh soft tissue mass was localized with ultrasound guidance. The skin was prepped and draped in the usual sterile fashion. Sterile US gel  and a sterile US probe cover were used. 1% Lidocaine used for local anesthesia. An 18-gauge amSTATZince coaxial core biopsy system was used to perform the biopsy. The outer needle was directed into the mass and ultrasound confirmed the needle  tip in the target. A total of 4 passes were made with 4 good cores of tissue,  under direct real time US guidance, the tip confirmed in the mass by ultrasound  for each pass. 4 core samples were given the the pathologist in attendance &  were deemed adequate for review. The patient tolerated the procedure well,  without complications. Standard post procedure pause. GUIDANCE: US guidance was used to position (and confirm the position of) the  biopsy needle(s). Images saved in PACS: Ultrasound    Impression  Successful ultrasound-guided right inner thigh soft tissue mass biopsy. MRI and MRCP- No results  MRI Results (most recent):  No results found for this or any previous visit. Assessment:     1. History of peptic ulcer disease. History is confusing- diagnosed via EGD but no treatment as reported.   She has had H. pylori and this has apparently been treated since arriving in the [place] She has some epigastric pain that may or may not be related. Could also have lymphoma of the stomach. I feel she needs EGD to assess for persistent ulcer disease and eradication of H. Pylori. 2. Colon cancer screening. Given the patient's age over 39, full evaluation of their colon is indicated exclude polyps and malignancy. 3. Abdominal pain. This could be due to recurrent ulcer disease, lymphoma the stomach, other. However feel the most likely causes her large ventral hernia. 4. Nausea. I feel her nausea is most likely due to either a large ventral hernia, her diabetes, or her B-cell lymphoma. 5. Type 2 diabetes mellitus. She is on Metformin for this problem. 6. B-cell lymphoma. She could have involvement of the colon as well potentially. 7. Large ventral hernia. She has part of her transverse colon and concern. This may make performing colonoscopy very difficult as far as having a complete colonoscopy. If it cannot be performed to the cecum, then she will need an air-contrast barium enema to look at the rest of her colon. Plan:     1. EGD and biopsies for H. Pylori, with MAC. I discussed the techniques involved with the procedure as well as the risks, benefits, and alternatives including but not limited to bleeding, infection, perforation requiring emergent surgery, missing lesions, death, and anesthesia related complications with the patient. All questions and concerns were answered. The patient voiced understanding and agrees to proceed. 2. Colonoscopy with MAC. Bowel prep magnesium citrate. I discussed the techniques involved with the procedure as well as the risks, benefits, and alternatives including but not limited to bleeding, infection, perforation requiring emergent surgery, missing lesions, death, and anesthesia related complications with the patient. All questions and concerns were answered.   The patient voiced understanding and agrees to proceed. 3. They need to hold their diabetes medication morning of the procedure. 4. Again, if colonoscopy cannot be completed to the cecum, then she will need air-contrast barium enema. This is definitely a possibility given her large ventral hernia which contains part of her transverse colon. 5. Further recommendations pending the patient's clinical course, if needed. 6. The patient will follow up with me as needed.       Alejandra Kelley MD

## 2022-04-08 DIAGNOSIS — R92.8 ABNORMAL MAMMOGRAM OF LEFT BREAST: ICD-10-CM

## 2022-04-12 ENCOUNTER — TRANSCRIBE ORDER (OUTPATIENT)
Dept: SCHEDULING | Age: 72
End: 2022-04-12

## 2022-04-12 ENCOUNTER — OFFICE VISIT (OUTPATIENT)
Age: 72
End: 2022-04-12

## 2022-04-12 VITALS
SYSTOLIC BLOOD PRESSURE: 129 MMHG | DIASTOLIC BLOOD PRESSURE: 72 MMHG | WEIGHT: 161.14 LBS | OXYGEN SATURATION: 98 % | BODY MASS INDEX: 33.82 KG/M2 | HEART RATE: 68 BPM | RESPIRATION RATE: 16 BRPM | HEIGHT: 58 IN | TEMPERATURE: 97.8 F

## 2022-04-12 DIAGNOSIS — I15.8 OTHER SECONDARY HYPERTENSION: ICD-10-CM

## 2022-04-12 DIAGNOSIS — N63.0 BREAST NODULE: ICD-10-CM

## 2022-04-12 DIAGNOSIS — N63.0 LUMP OR MASS IN BREAST: ICD-10-CM

## 2022-04-12 DIAGNOSIS — E55.9 VITAMIN D DEFICIENCY: ICD-10-CM

## 2022-04-12 DIAGNOSIS — C83.35 DIFFUSE LARGE B-CELL LYMPHOMA OF LYMPH NODES OF LOWER EXTREMITY (HCC): ICD-10-CM

## 2022-04-12 DIAGNOSIS — R22.41 KNEE MASS, RIGHT: Primary | ICD-10-CM

## 2022-04-12 DIAGNOSIS — I42.9 PRIMARY CARDIOMYOPATHY (HCC): Primary | ICD-10-CM

## 2022-04-12 DIAGNOSIS — R22.41 MASS OF LEG, RIGHT: ICD-10-CM

## 2022-04-12 DIAGNOSIS — M17.0 PRIMARY OSTEOARTHRITIS OF BOTH KNEES: ICD-10-CM

## 2022-04-12 DIAGNOSIS — E13.9 DIABETES 1.5, MANAGED AS TYPE 2 (HCC): ICD-10-CM

## 2022-04-12 DIAGNOSIS — R22.41 MASS OF LEG, RIGHT: Primary | ICD-10-CM

## 2022-04-12 DIAGNOSIS — K27.9 PUD (PEPTIC ULCER DISEASE): ICD-10-CM

## 2022-04-12 DIAGNOSIS — C83.35 RETICULOSARCOMA OF LYMPH NODES OF INGUINAL REGION AND LOWER LIMB (HCC): ICD-10-CM

## 2022-04-12 PROCEDURE — 3044F HG A1C LEVEL LT 7.0%: CPT | Performed by: INTERNAL MEDICINE

## 2022-04-12 PROCEDURE — 99215 OFFICE O/P EST HI 40 MIN: CPT | Performed by: INTERNAL MEDICINE

## 2022-04-12 NOTE — PROGRESS NOTES
Hematology/Oncology  Progress Note    Name: Samantha Nash  Date: 2022  : 1950  Primary Care Provider: Hasmukh Baxter NP    Ms. vYes Rushing is a 66y.o. year old female with right proximal nelia-medial mass 12 x 8 cm which was 4 cm in 2022. It is mobile non-tender and no redness. Consistency is that of fatty tissue.     This may be a LND or soft tissue. Biopsy revealed Diffuse Large B Cell Lymphoma     Patient speaks Norwegian and her daughter speaks fairly good english. They did request an  and we had an extensive and prolonged discussion.     Forty years ago patient had a hysterectomy for benign reasons, in [facility].     Patient has a history of peptic ulcer  She is scheduled for EGD and Colonoscopy in early May    Current Therapy: Staging evaluation    Subjective: The past medical, surgical and social history has been reviewed and remains unchanged.    Past Medical History:   Diagnosis Date    Diabetes ([address]     Hypertension      Past Surgical History:   Procedure Laterality Date    HX  SECTION      HX HYSTERECTOMY       Social History     Socioeconomic History    Marital status:      Spouse name: Not on file    Number of children: Not on file    Years of education: Not on file    Highest education level: Not on file   Occupational History    Not on file   Tobacco Use    Smoking status: Never Smoker    Smokeless tobacco: Never Used   Vaping Use    Vaping Use: Never used   Substance and Sexual Activity    Alcohol use: Never    Drug use: Never    Sexual activity: Not on file   Other Topics Concern    Not on file   Social History Narrative    Not on file     Social Determinants of Health     Financial Resource Strain:     Difficulty of Paying Living Expenses: Not on file   Food Insecurity:     Worried About [address] in the Last Year: Not on file    [address] in the Last Year: Not on file   Transportation Needs:     Lack of Transportation (Medical): Not on file    Lack of Transportation (Non-Medical): Not on file   Physical Activity:     Days of Exercise per Week: Not on file    Minutes of Exercise per Session: Not on file   Stress:     Feeling of Stress : Not on file   Social Connections:     Frequency of Communication with Friends and Family: Not on file    Frequency of Social Gatherings with Friends and Family: Not on file    Attends Catholic Services: Not on file    Active Member of [address] [place] [address] [employer] or Organizations: Not on file    Attends Club or Organization Meetings: Not on file    Marital Status: Not on file   Intimate Partner Violence:     Fear of Current or Ex-Partner: Not on file    Emotionally Abused: Not on file    Physically Abused: Not on file    Sexually Abused: Not on file   Housing Stability:     Unable to Pay for Housing in the Last Year: Not on file    Number of [place] in the Last Year: Not on file    Unstable Housing in the Last Year: Not on file     Family History   Problem Relation Age of Onset    Stroke Mother     Cancer Sister     Diabetes Sister     Hypertension Sister     Dementia Brother      Current Outpatient Medications   Medication Sig Dispense Refill    ondansetron (ZOFRAN ODT) 4 mg disintegrating tablet Take 1 Tablet by mouth every eight (8) hours as needed for Nausea or Vomiting. 30 Tablet 1    LOSARTAN PO Take  by mouth.  verapamil HCl (VERAPAMIL PO) Take  by mouth.  metformin HCl (METFORMIN PO) Take  by mouth. Review of Systems:    General :The patient has complaints and there is no physical distress evident. Psychological : patient denies having any psychological symptoms such as hallucinations depression or anxiety. Ophthalmic:the patient denies having any visual impairment or eye discomfort. Allergy and Immunology:the patient denies having any seasonal allergies or allergies to medications other than those already outlined above. Hematological and Lymphatic: the patient denies having any bruising, bleeding or lymphadenopathy. Right groin adenopathy 12x8 cm    Endocrine: the patient denies having any heat or cold intolerance. There is no history of diabetes or thyroid disorders. Respiratory:the patient denies having any cough, shortness of breath, or dyspnea on exertion. Cardiovascular: there are no complaints of chest pain, palpitations, chest pounding, or dyspnea on exertion. Gastrointestinal: the patient denies having nausea, emesis, diarrhea, constipation, or blood in the stool. Genito-Urinary: the patient denies having urinary urgency, frequency, or dysuria. Musculoskeletal: with the exception of mild arthralgias the patient has no other musculoskeletal complaints. Neurological:  denies having any numbness, tingling, or neurologic deficits. Dermatological: patient denies having any unexplained rash, skin ulcerations, or hives. Objective:     Visit Vitals  /72   Pulse 68   Temp 97.8 °F (36.6 °C)   Resp 16   Ht 4' 10\" (1.473 m)   Wt 73.1 kg (161 lb 2.2 oz)   SpO2 98%   BMI 33.68 kg/m²     Pain Score: 3    Physical Exam chaperone Deedee Goldman DNP    ECO    General: Well appearing, in NAD    Psychologic: mood and affect are appropriate, no anxiety or depression noted    Skin: examination of the skin reveals no bruising, rash or petechiae    HEENT: Normocephalic, atraumatic. Conjunctiva and sclera are clear. Pupils are equal, round and reactive to light. EOMs are intact. Neck: supple without lymphadenopathy, JVD or thyromegaly    Lymphatics: no palpable cervical, supraclavicular, infraclavicular, axillary or inguinal lymphadenopathy  Right prox medial thigh 12x8 cm    Lungs: clear breath sounds bilaterally, no rhonchi or wheezes noted    Heart: Regular rate and rhythm S1-S2 noted.      Abdomen: soft, non-tender, non-distended, no HSM, positive bowel sounds    Extremities: without clubbing, cyanosis or edema    Neurologic: no focal deficits, steady gait, Alert and oriented x 3. Laboratory Data:     Results for orders placed or performed during the hospital encounter of 03/23/22   CBC WITH 3 PART DIFF     Status: Abnormal   Result Value Ref Range Status    WBC 5.8 4.5 - 13.0 K/uL Final    RBC 4.24 4.10 - 5.10 M/uL Final    HGB 12.1 12.0 - 16.0 g/dL Final    HCT 37.4 36 - 48 % Final    MCV 88.2 78 - 102 FL Final    MCH 28.5 25.0 - 35.0 PG Final    MCHC 32.4 31 - 37 g/dL Final    RDW 12.7 11.5 - 14.5 % Final    PLATELET 171 520 - 717 K/uL Final    NEUTROPHILS 71 (H) 40 - 70 % Final    Mixed cells 13 0.1 - 17 % Final    LYMPHOCYTES 17 14 - 44 % Final    ABS. NEUTROPHILS 4.1 1.8 - 9.5 K/UL Final    ABS. MIXED CELLS 0.7 0.0 - 2.3 K/uL Final    ABS. LYMPHOCYTES 1.0 (L) 1.1 - 5.9 K/UL Final     Comment: Test performed at [address] [place] [address] or Outpatient Infusion Center Location. Reviewed by Medical Director. DF AUTOMATED   Final       Patient Active Problem List   Diagnosis Code    Lump of skin of right lower extremity R22.41    Primary osteoarthritis of both knees M17.0    Diabetes mellitus without complication ([address] L65.7    Helicobacter pylori antibody positive R76.8         Assessment:     1. Mass of leg, right    2. Diabetes 1.5, managed as type 2 ([address]    3. Other secondary hypertension    4. Primary osteoarthritis of both knees    5. PUD (peptic ulcer disease)    6. Breast nodule    7. Vitamin D deficiency    8. Diffuse large B-cell lymphoma of lymph nodes of lower extremity ([address]        Plan:     1. Patient is somewhat frail independent of lymphoma. She has other medical issues. 2. We should perform staging with a PET/CT and bilateral bone marrow biopsy. 3. We are obtaining echocardiogram and electrocardiogram.  4. We are obtaining port placement for chemotherapy.   5. If patient's cardiac status is not too poorly she will proceed with mini R-CHOP if heart too poor  then we shall use an alternative non-Adriamycin-based therapy  6. Patient and her daughter had an extensive discussion with myself with the assistance of the Polish  and the Deedee Goldman DNP  7. We are providing the patient with chemotherapy instructions. We did inform her that we might change the chemotherapy after the staging is available however the information will give her general knowledge of what chemotherapy is. 8. Patient will return in 6 weeks to finalize  the treatment plan    R-mini-CHOP   CYCLE 21 DAYS    rituximab 375 mg/m2,   cyclophosphamide 400 mg/m2,   doxorubicin 25 mg/m2,   vincristine 1 mg on day 1 of each cycle,   40 mg/m2 prednisone on days 1 to 5    Follow-up and Dispositions  ·   Return in about 6 weeks (around 5/24/2022) for Follow up with labs, Follow_up In Person.         Orders Placed This Encounter    PET/CT TUMOR IMAGE SKULL THIGH W (INI)     Initial staging     Standing Status:   Future     Standing Expiration Date:   5/12/2023     Scheduling Instructions:      [facility]    IR BX BONE MARROW DIAGNOSTIC     Please perform bilateral biopsy  Send for morphology, Flow, FISH and karyotype    I am also ordering PORT placement     Standing Status:   Future     Standing Expiration Date:   5/12/2023     Order Specific Question:   Transport     Answer:   Ambulatory [1]     Order Specific Question:   Reason for Exam     Answer:   Staging for Diffuse cell B lymphoma    IR INSERT TUNL CVC W PORT OVER 5 YEARS     Standing Status:   Future     Standing Expiration Date:   5/12/2023     Order Specific Question:   Transport     Answer:   Ambulatory [1]    CBC WITH AUTOMATED DIFF     Standing Status:   Future     Standing Expiration Date:   4/13/2023    VITAMIN D, 25 HYDROXY     Standing Status:   Future     Standing Expiration Date:   3/28/7566    METABOLIC PANEL, COMPREHENSIVE     Standing Status:   Future     Standing Expiration Date:   4/13/2023    LD Standing Status:   Future     Standing Expiration Date:   4/13/2023    URIC ACID     Standing Status:   Future     Standing Expiration Date:   4/13/2023    EKG, 12 LEAD, INITIAL     Pre Chemotherapy     Standing Status:   Future     Standing Expiration Date:   10/12/2022     Order Specific Question:   Reason for Exam:     Answer:   Pre Chemotherapy       James Marshall MD  4/12/2022

## 2022-04-12 NOTE — Clinical Note
Patient has DLCL she is undergoing staging. I tentatively plan for mini RCHOP in mid May on completion of her staging etc and my return.

## 2022-04-12 NOTE — Clinical Note
R-mini-CHOP   CYCLE 21 DAYS    rituximab 375 mg/m2,   cyclophosphamide 400 mg/m2,   doxorubicin 25 mg/m2,   vincristine 1 mg on day 1 of each cycle,   40 mg/m2 prednisone on days 1 to 5

## 2022-04-20 ENCOUNTER — VIRTUAL VISIT (OUTPATIENT)
Dept: FAMILY MEDICINE CLINIC | Facility: CLINIC | Age: 72
End: 2022-04-20

## 2022-04-20 DIAGNOSIS — Z76.0 MEDICATION REFILL: ICD-10-CM

## 2022-04-20 DIAGNOSIS — R82.90 FOUL SMELLING URINE: Primary | ICD-10-CM

## 2022-04-20 DIAGNOSIS — I10 ESSENTIAL HYPERTENSION: ICD-10-CM

## 2022-04-20 DIAGNOSIS — R11.0 NAUSEA: ICD-10-CM

## 2022-04-20 PROCEDURE — 99422 OL DIG E/M SVC 11-20 MIN: CPT | Performed by: CLINICAL NURSE SPECIALIST

## 2022-04-20 RX ORDER — ONDANSETRON 4 MG/1
4 TABLET, ORALLY DISINTEGRATING ORAL
Qty: 30 TABLET | Refills: 1 | Status: ON HOLD | OUTPATIENT
Start: 2022-04-20 | End: 2022-05-11

## 2022-04-20 NOTE — PROGRESS NOTES
Bakari Gannon (: 1950) is a 66 y.o. female, established patient, here for evaluation of the following chief complaint(s):   Medication Refill and Urinary Odor       ASSESSMENT/PLAN:  Below is the assessment and plan developed based on review of pertinent history, labs, studies, and medications. 1. Foul smelling urine  2. Medication refill  3. Nausea  4. Essential hypertension      Ondansetron refilled to pharmacy on file. Advised that chlorthalidone not a current med per our list.   Encouraged to review meds and advise or to contact pharmacy with refill request.   Encouraged to drink at least 64 ounces of water per day. If urine still malodorous in a few days or if new symptoms develop, encouraged to call. Given listed appt dates, times, and locations for next week. Instructed to call oncology to confirm the accuracy of these scheduled appointments. No follow-ups on file. SUBJECTIVE/OBJECTIVE:  Patient states that her urine has been \"stinky\" recently. Denies any dysuria, hematuria, abdominal pain, LBP, fever, chills. +nausea that was present prior to onset of foul smelling urine. Unable to come in for a visit next week as she has many appointments, M/T/W. Requesting refills of zofran and \"high BP medication. \" Per daughter, the medication that she needs a refill of is chlorthalidone, but this is not on med list as a current nor past medication. Patient/daughter do not have medications readily available right now. Daughter admits that while she knows that mom has many appointments next week, she misplaced the papers with all of the information and now is uncertain about the specifics of these visits. Wondering if we are able to provide her with this information. Review of Systems   Constitutional: Negative. Respiratory: Negative. Cardiovascular: Negative. Genitourinary: Negative for difficulty urinating, dysuria, flank pain, hematuria and urgency.                 On this date 04/20/2022 I have spent 15 minutes reviewing previous notes, test results and (virtual) with the patient discussing the diagnosis and importance of compliance with the treatment plan as well as documenting on the day of the visit. Behzad Dee, was evaluated through a synchronous (real-time) audio encounter. The patient (or guardian if applicable) is aware that this is a billable service, which includes applicable co-pays. Verbal consent to proceed has been obtained. The visit was conducted pursuant to the emergency declaration under the [address] [place] [address], [address] [place] [address] authority and the "[employer] and [employer] General Act. Patient identification was verified, and a caregiver was present when appropriate. The patient was located at home in a state where the provider was licensed to provide care. An electronic signature was used to authenticate this note.   -- Melinda Hooks NP

## 2022-04-25 ENCOUNTER — HOSPITAL ENCOUNTER (OUTPATIENT)
Dept: INTERVENTIONAL RADIOLOGY/VASCULAR | Age: 72
Discharge: HOME | End: 2022-04-25
Attending: INTERNAL MEDICINE | Admitting: RADIOLOGY

## 2022-04-25 ENCOUNTER — HOSPITAL ENCOUNTER (OUTPATIENT)
Dept: INTERVENTIONAL RADIOLOGY/VASCULAR | Age: 72
End: 2022-04-25
Attending: INTERNAL MEDICINE | Admitting: RADIOLOGY

## 2022-04-25 VITALS
BODY MASS INDEX: 32.46 KG/M2 | SYSTOLIC BLOOD PRESSURE: 137 MMHG | RESPIRATION RATE: 16 BRPM | DIASTOLIC BLOOD PRESSURE: 66 MMHG | WEIGHT: 161 LBS | HEIGHT: 59 IN | HEART RATE: 70 BPM | OXYGEN SATURATION: 98 %

## 2022-04-25 LAB
ANION GAP SERPL CALC-SCNC: 5 MMOL/L (ref 3–18)
APTT PPP: 30.9 SEC (ref 23–36.4)
BASOPHILS # BLD: 0.1 K/UL (ref 0–0.1)
BASOPHILS NFR BLD: 1 % (ref 0–2)
BUN SERPL-MCNC: 13 MG/DL (ref 7–18)
BUN/CREAT SERPL: 16 (ref 12–20)
CALCIUM SERPL-MCNC: 9.3 MG/DL (ref 8.5–10.1)
CHLORIDE SERPL-SCNC: 105 MMOL/L (ref 100–111)
CO2 SERPL-SCNC: 29 MMOL/L (ref 21–32)
CREAT SERPL-MCNC: 0.81 MG/DL (ref 0.6–1.3)
DIFFERENTIAL METHOD BLD: ABNORMAL
EOSINOPHIL # BLD: 0.4 K/UL (ref 0–0.4)
EOSINOPHIL NFR BLD: 6 % (ref 0–5)
ERYTHROCYTE [DISTWIDTH] IN BLOOD BY AUTOMATED COUNT: 13.4 % (ref 11.6–14.5)
GLUCOSE SERPL-MCNC: 100 MG/DL (ref 74–99)
HCT VFR BLD AUTO: 35 % (ref 35–45)
HGB BLD-MCNC: 11.6 G/DL (ref 12–16)
IMM GRANULOCYTES # BLD AUTO: 0 K/UL (ref 0–0.04)
IMM GRANULOCYTES NFR BLD AUTO: 0 % (ref 0–0.5)
INR PPP: 0.9 (ref 0.8–1.2)
LYMPHOCYTES # BLD: 1.1 K/UL (ref 0.9–3.6)
LYMPHOCYTES NFR BLD: 18 % (ref 21–52)
MCH RBC QN AUTO: 27.7 PG (ref 24–34)
MCHC RBC AUTO-ENTMCNC: 33.1 G/DL (ref 31–37)
MCV RBC AUTO: 83.5 FL (ref 78–100)
MONOCYTES # BLD: 0.8 K/UL (ref 0.05–1.2)
MONOCYTES NFR BLD: 13 % (ref 3–10)
NEUTS SEG # BLD: 3.9 K/UL (ref 1.8–8)
NEUTS SEG NFR BLD: 62 % (ref 40–73)
NRBC # BLD: 0 K/UL (ref 0–0.01)
NRBC BLD-RTO: 0 PER 100 WBC
PLATELET # BLD AUTO: 229 K/UL (ref 135–420)
PMV BLD AUTO: 9.1 FL (ref 9.2–11.8)
POTASSIUM SERPL-SCNC: 3.8 MMOL/L (ref 3.5–5.5)
PROTHROMBIN TIME: 12.1 SEC (ref 11.5–15.2)
RBC # BLD AUTO: 4.19 M/UL (ref 4.2–5.3)
SODIUM SERPL-SCNC: 139 MMOL/L (ref 136–145)
WBC # BLD AUTO: 6.3 K/UL (ref 4.6–13.2)

## 2022-04-25 PROCEDURE — 88184 FLOWCYTOMETRY/ TC 1 MARKER: CPT

## 2022-04-25 PROCEDURE — 80048 BASIC METABOLIC PNL TOTAL CA: CPT

## 2022-04-25 PROCEDURE — 74011250636 HC RX REV CODE- 250/636: Performed by: RADIOLOGY

## 2022-04-25 PROCEDURE — 38221 DX BONE MARROW BIOPSIES: CPT

## 2022-04-25 PROCEDURE — 88305 TISSUE EXAM BY PATHOLOGIST: CPT

## 2022-04-25 PROCEDURE — 74011250636 HC RX REV CODE- 250/636: Performed by: SURGERY

## 2022-04-25 PROCEDURE — 88313 SPECIAL STAINS GROUP 2: CPT

## 2022-04-25 PROCEDURE — 85610 PROTHROMBIN TIME: CPT

## 2022-04-25 PROCEDURE — 85025 COMPLETE CBC W/AUTO DIFF WBC: CPT

## 2022-04-25 PROCEDURE — 88264 CHROMOSOME ANALYSIS 20-25: CPT

## 2022-04-25 PROCEDURE — 74011000250 HC RX REV CODE- 250: Performed by: SURGERY

## 2022-04-25 PROCEDURE — 88237 TISSUE CULTURE BONE MARROW: CPT

## 2022-04-25 PROCEDURE — 85730 THROMBOPLASTIN TIME PARTIAL: CPT

## 2022-04-25 PROCEDURE — 88185 FLOWCYTOMETRY/TC ADD-ON: CPT

## 2022-04-25 PROCEDURE — 36561 INSERT TUNNELED CV CATH: CPT

## 2022-04-25 PROCEDURE — 88311 DECALCIFY TISSUE: CPT

## 2022-04-25 RX ORDER — LIDOCAINE HYDROCHLORIDE 10 MG/ML
30 INJECTION, SOLUTION EPIDURAL; INFILTRATION; INTRACAUDAL; PERINEURAL ONCE
Status: COMPLETED | OUTPATIENT
Start: 2022-04-25 | End: 2022-04-25

## 2022-04-25 RX ORDER — LIDOCAINE HCL/EPINEPHRINE/PF 2%-1:200K
20 VIAL (ML) INJECTION ONCE
Status: COMPLETED | OUTPATIENT
Start: 2022-04-25 | End: 2022-04-25

## 2022-04-25 RX ORDER — SODIUM CHLORIDE 9 MG/ML
20 INJECTION, SOLUTION INTRAVENOUS CONTINUOUS
Status: DISCONTINUED | OUTPATIENT
Start: 2022-04-25 | End: 2023-04-27 | Stop reason: HOSPADM

## 2022-04-25 RX ORDER — HEPARIN SODIUM (PORCINE) LOCK FLUSH IV SOLN 100 UNIT/ML 100 UNIT/ML
500 SOLUTION INTRAVENOUS AS NEEDED
Status: DISCONTINUED | OUTPATIENT
Start: 2022-04-25 | End: 2023-04-27 | Stop reason: HOSPADM

## 2022-04-25 RX ORDER — FENTANYL CITRATE 50 UG/ML
12.5-1 INJECTION, SOLUTION INTRAMUSCULAR; INTRAVENOUS
Status: DISCONTINUED | OUTPATIENT
Start: 2022-04-25 | End: 2022-04-25 | Stop reason: ALTCHOICE

## 2022-04-25 RX ORDER — MIDAZOLAM HYDROCHLORIDE 1 MG/ML
.5-2 INJECTION, SOLUTION INTRAMUSCULAR; INTRAVENOUS
Status: DISCONTINUED | OUTPATIENT
Start: 2022-04-25 | End: 2022-04-25 | Stop reason: ALTCHOICE

## 2022-04-25 RX ADMIN — MIDAZOLAM 2 MG: 1 INJECTION INTRAMUSCULAR; INTRAVENOUS at 09:35

## 2022-04-25 RX ADMIN — SODIUM CHLORIDE 20 ML/HR: 900 INJECTION, SOLUTION INTRAVENOUS at 09:24

## 2022-04-25 RX ADMIN — HEPARIN SODIUM (PORCINE) LOCK FLUSH IV SOLN 100 UNIT/ML 500 UNITS: 100 SOLUTION at 10:24

## 2022-04-25 RX ADMIN — LIDOCAINE HYDROCHLORIDE 30 ML: 10 INJECTION, SOLUTION EPIDURAL; INFILTRATION; INTRACAUDAL; PERINEURAL at 09:35

## 2022-04-25 RX ADMIN — FENTANYL CITRATE 50 MCG: 50 INJECTION INTRAMUSCULAR; INTRAVENOUS at 09:35

## 2022-04-25 RX ADMIN — FENTANYL CITRATE 25 MCG: 50 INJECTION INTRAMUSCULAR; INTRAVENOUS at 10:00

## 2022-04-25 RX ADMIN — FENTANYL CITRATE 25 MCG: 50 INJECTION INTRAMUSCULAR; INTRAVENOUS at 10:15

## 2022-04-25 RX ADMIN — LIDOCAINE HYDROCHLORIDE AND EPINEPHRINE 400 MG: 20; 5 INJECTION, SOLUTION EPIDURAL; INFILTRATION; INTRACAUDAL; PERINEURAL at 10:02

## 2022-04-25 RX ADMIN — MIDAZOLAM 0.5 MG: 1 INJECTION INTRAMUSCULAR; INTRAVENOUS at 10:00

## 2022-04-25 RX ADMIN — MIDAZOLAM 0.5 MG: 1 INJECTION INTRAMUSCULAR; INTRAVENOUS at 10:15

## 2022-04-25 RX ADMIN — FENTANYL CITRATE 50 MCG: 50 INJECTION INTRAMUSCULAR; INTRAVENOUS at 09:40

## 2022-04-25 RX ADMIN — VANCOMYCIN HYDROCHLORIDE 1000 MG: 1 INJECTION, POWDER, LYOPHILIZED, FOR SOLUTION INTRAVENOUS at 09:50

## 2022-04-25 NOTE — PROGRESS NOTES
Verbal report given to KIM Malik in [facility]. Patient NAD, VSS and A&O x3. Patient able to maintain oral airway and does not need supplemental O2. Patient able to move all extremities appropriately. All questions answered.

## 2022-04-25 NOTE — H&P
.  History and Physical    Patient: Aleksander Pak           Sex: female       DOA: (Not on file)  YOB: 1950      Age:  66 y.o.     LOS:  LOS: 0 days        HPI:     Aleksander Pak is a 66 y.o. female who has history of lymphoma here for a bone marrow biopsy and aspiration and mediport placement with moderate sedation. Past Medical History:   Diagnosis Date    Diabetes ([address]     Hypertension        Past Surgical History:   Procedure Laterality Date    HX  SECTION      HX HYSTERECTOMY         Family History   Problem Relation Age of Onset    Stroke Mother     Cancer Sister     Diabetes Sister     Hypertension Sister     Dementia Brother        Social History     Socioeconomic History    Marital status:    Tobacco Use    Smoking status: Never Smoker    Smokeless tobacco: Never Used   Vaping Use    Vaping Use: Never used   Substance and Sexual Activity    Alcohol use: Never    Drug use: Never       Prior to Admission medications    Medication Sig Start Date End Date Taking? Authorizing Provider   LOSARTAN PO Take  by mouth. Yes Provider, Historical   verapamil HCl (VERAPAMIL PO) Take  by mouth. Yes Provider, Historical   metformin HCl (METFORMIN PO) Take  by mouth. Yes Provider, Historical   ondansetron (ZOFRAN ODT) 4 mg disintegrating tablet Take 1 Tablet by mouth every eight (8) hours as needed for Nausea or Vomiting. Patient not taking: Reported on 2022   Jen Maldonado NP       Allergies   Allergen Reactions    Penicillins Nausea Only and Vertigo       Physical Exam:      Visit Vitals  Ht 4' 11\" (1.499 m)   Wt 73 kg (161 lb)   Breastfeeding No   BMI 32.52 kg/m²     Physical Exam:  Mallampati 2 ASA 3  General: A&O x 4, NAD  Heart: RRR  Lungs: Normal work of breathing on room air    Labs Reviewed: All lab results for the last 24 hours reviewed.     Assessment/Plan     The patient is an appropriate candidate to undergo the planned procedure and sedation    MANNY Syed

## 2022-04-25 NOTE — PROCEDURES
RADIOLOGY POST PROCEDURE NOTE     August 18, 2021       10:23 AM     Preoperative Diagnosis:   lymphoma    Postoperative Diagnosis:  Same. : Remington Gonzales MD    Assistant:  None. Type of Anesthesia: Moderate Sedation    Procedure/Description:  Fluoroscopically guided bone marrow biopsy and aspiration    Findings:   Successful biopsy and aspiration of the left iliac bone using the 11G On-Control bone biopsy device. Pathology was present and confirmed adequacy of the specimen. Estimated blood Loss:  Minimal    Specimen Removed:   yes    Blood transfusions:  None. Implants:  None. Complications: None    Condition: Stable    Discharge Plan:  discharge home     AGUSTÍN Gonzales MD  Interventional Radiology  04/25/22  10:25 AM

## 2022-04-25 NOTE — DISCHARGE INSTRUCTIONS
[facility],[address] INSTRUCTIONS    General Instructions:     A biopsy is the removal of a small piece of tissue for microscopic examination or testing. Healthy tissue can be obtained for the purpose of tissue-type matching for transplants. Unhealthy tissues are more commonly biopsied to diagnose disease. Lung Biopsy:     A needle lung biopsy is performed when there is a mass discovered in the lung area. The most serious risk is infection, bleeding, and pneumothorax (a collapsed lung). Signs of pneumothorax include shortness of breath, rapid heart rate, and blueness of the skin. If any of these occur, call 911. Liver Biopsy: This test helps detect cancer, infections, and the cause of an enlargement of the liver or elevated liver enzymes. It also helps to diagnose a number of liver diseases. The pain associated with the procedure may be felt in the shoulder. The risks include internal bleeding, pneumothorax, and injury to the surrounding organs. Renal Biopsy: This procedure is sometimes done to evaluate a transplanted kidney. It is also used to evaluate unexplained decrease in kidney function, blood, or protein in the urine. You may see bright red blood in the urine the first 24 hours after the test. If the bleeding lasts longer, you need to call your doctor. There is a risk of infection and bleeding into the muscle, which may cause soreness. Spinal Biopsy: This test is sometimes done in conjunction with other procedures. Your back will be sore, as we are taking a small sample of bone, which is slightly more difficult to sample than tissue. General Biopsy:     A mass can grow in any area of the body, and we are taking a specimen as ordered by your doctor. The risks are the same. They include bleeding, pain, and infection. Home Care Instructions: You may resume your regular diet and medication regimen.  Do not drink alcohol, drive, or make any important legal decisions in the next 24 hours. Do not lift anything heavier than a gallon of milk until the soreness goes away. You may use over the counter acetaminophen or ibuprofen for the soreness. You may apply an ice pack to the affected area for 20-30 minutes at time for the first 24 hours. After that, you may apply a heat pack. Call If: You should call your Physician and/or the Radiology Nurse if you have any questions or concerns about the biopsy site. Call if you should have increased pain, fever, redness, drainage, or bleeding more than a small spot on the bandage. Follow-Up Instructions: Please see your ordering doctor as he/she has requested. To Reach Us:      [phone]    Patient Signature:  Date: 4/25/2022  Discharging Nurse: Mic Perez Implanted Port Discharge Instructions      General Instructions:   A port is like an implanted IV. They are usually ordered for patients who will be getting chemotherapy, but can also be used as an IV for long term antibiotics, large amounts of fluids, and/or blood products. Your blood can be drawn from your port for labs also. Those patients who do not have good veins find the ports convenient as they can get the IV they need with one stick. The port can be used long term, and the care is easy. The device is under the skin, and once the skin heals, care is minimal. All that is required is the nurse who accesses the port will need to flush it with heparinized saline after each use. Ports are usually placed in the chest wall, usually on the right side. But they can be place in the arms and in the abdomen. Home Care Instructions: If your port is in your arm, do not allow blood pressure or other IVs to be place in that arm. Do not allow bra straps or any clothing to rub the skin over the port. Do not bathe or swim until the skin has healed and if the port is accessed.  Once it is healed, and when the port is not accessed, it is okay to bathe and swim. Restrict yourself to light activity for the first 5 days after getting the port put in, after that, resume normal activity slowly. You may resume your normal diet and medications. Follow-Up Instructions: Please see your oncologist, or whatever physician ordered the port as he/she has requested of you. Call If: You should call your Physician and/or the Radiology Nurse if you notice redness, pus, swelling, or pain from the area of your incision. Call if you should develop a fever. The nurses who access your port will know to call your doctor if the port does not seem to be working properly. You need to tell the nurses who use the port if you should have any pain or swelling at the site during an infusion.     To Reach Us:     [phone]    Patient Signature:  Date: 4/25/2022  Discharging Nurse: Nancy Cordova

## 2022-04-25 NOTE — PROGRESS NOTES
Cath holding summary     Patient escorted to cath holding from waiting area ambulatory, alert and oriented x 4, . Changed into gown and placed on monitor. NPO since MN. Lab results, med rec and H&P reviewed on chart. PIV x 1 inserted without difficulty. C/o 5/10 abdominal pain from ulcer  Family to bedside.

## 2022-04-25 NOTE — PROGRESS NOTES
1145: AVS Discharge instructions reviewed with patient and copy given to patient. All questions answered. Patient verbalized understanding to all discharge instructions. PIV removed. Procedural site within normal limits. No hematoma or bleeding noted from procedural and PIV site. No pain noted at discharge. Patient discharged with support person in stable condition. Escorted out to vehicle for transport home.

## 2022-04-25 NOTE — PROCEDURES
Interventional Radiology Brief Post Procedure Note     Procedure Performed: Mediport placement under fluoroscopic guidance     : Ronni Barr PA-C     Assistant: None    Attending: Dr. Nicholas Sandoval     Pre-operative Diagnosis: Lymphoma requiring chemotherapy treatment     Post-operative Diagnosis: Same      Anesthesia: 1% lidocaine with epi and IV moderate sedation with Versed and Fentanyl administered and monitored by qualified nursing staff. Findings:  - Successful placement of a right infraclavicular single lumen mediport under image guidance  - Catheter tip at the SVC/RA junction  - Please refer to report on PACS for full details  - Mediport is OK for use     Specimens: None     Complications: No immediate     Estimated Blood Loss:  Minimal     Blood transfusions:  None. Implants: Please see PACS report.       Condition: Stable      Disposition: Nursing recovery unit for 1 hour     Impression: Successful right Mediport placement     MANNY Patel

## 2022-04-26 ENCOUNTER — HOSPITAL ENCOUNTER (OUTPATIENT)
Dept: PET IMAGING | Age: 72
Discharge: HOME | End: 2022-04-26
Attending: INTERNAL MEDICINE

## 2022-04-26 DIAGNOSIS — M17.0 PRIMARY OSTEOARTHRITIS OF BOTH KNEES: ICD-10-CM

## 2022-04-26 DIAGNOSIS — I15.8 OTHER SECONDARY HYPERTENSION: ICD-10-CM

## 2022-04-26 DIAGNOSIS — C83.35 RETICULOSARCOMA OF LYMPH NODES OF INGUINAL REGION AND LOWER LIMB (HCC): ICD-10-CM

## 2022-04-26 DIAGNOSIS — K27.9 PUD (PEPTIC ULCER DISEASE): ICD-10-CM

## 2022-04-26 DIAGNOSIS — E13.9 DIABETES 1.5, MANAGED AS TYPE 2 (HCC): ICD-10-CM

## 2022-04-26 DIAGNOSIS — C83.35 DIFFUSE LARGE B-CELL LYMPHOMA OF LYMPH NODES OF LOWER EXTREMITY (HCC): ICD-10-CM

## 2022-04-26 DIAGNOSIS — R22.41 MASS OF LEG, RIGHT: ICD-10-CM

## 2022-04-26 DIAGNOSIS — R22.41 KNEE MASS, RIGHT: ICD-10-CM

## 2022-04-26 DIAGNOSIS — E55.9 VITAMIN D DEFICIENCY: ICD-10-CM

## 2022-04-26 DIAGNOSIS — N63.0 LUMP OR MASS IN BREAST: ICD-10-CM

## 2022-04-26 DIAGNOSIS — N63.0 BREAST NODULE: ICD-10-CM

## 2022-04-26 PROCEDURE — 74011000250 HC RX REV CODE- 250: Performed by: INTERNAL MEDICINE

## 2022-04-26 PROCEDURE — A9552 F18 FDG: HCPCS

## 2022-04-26 RX ORDER — FLUDEOXYGLUCOSE F-18 200 MCI/ML
7.76 INJECTION INTRAVENOUS ONCE
Status: COMPLETED | OUTPATIENT
Start: 2022-04-26 | End: 2022-04-26

## 2022-04-26 RX ORDER — BARIUM SULFATE 20 MG/ML
450 SUSPENSION ORAL
Status: COMPLETED | OUTPATIENT
Start: 2022-04-26 | End: 2022-04-26

## 2022-04-26 RX ADMIN — FLUDEOXYGLUCOSE F-18 7.76 MILLICURIE: 200 INJECTION INTRAVENOUS at 10:20

## 2022-04-26 RX ADMIN — BARIUM SULFATE 450 ML: 20 SUSPENSION ORAL at 10:58

## 2022-04-27 ENCOUNTER — HOSPITAL ENCOUNTER (OUTPATIENT)
Dept: ULTRASOUND IMAGING | Age: 72
Discharge: HOME | End: 2022-04-27

## 2022-04-27 ENCOUNTER — HOSPITAL ENCOUNTER (OUTPATIENT)
Dept: MAMMOGRAPHY | Age: 72
Discharge: HOME | End: 2022-04-27

## 2022-04-27 PROCEDURE — 77061 BREAST TOMOSYNTHESIS UNI: CPT

## 2022-04-27 PROCEDURE — 76642 ULTRASOUND BREAST LIMITED: CPT

## 2022-04-29 RX ORDER — INDOMETHACIN 25 MG/1
25 CAPSULE ORAL 2 TIMES DAILY
COMMUNITY

## 2022-05-02 ENCOUNTER — HOSPITAL ENCOUNTER (OUTPATIENT)
Dept: INTERVENTIONAL RADIOLOGY/VASCULAR | Age: 72
Discharge: HOME | End: 2022-05-02

## 2022-05-02 PROCEDURE — 99211 OFF/OP EST MAY X REQ PHY/QHP: CPT

## 2022-05-04 ENCOUNTER — ANESTHESIA EVENT (OUTPATIENT)
Dept: ENDOSCOPY | Age: 72
End: 2022-05-04

## 2022-05-04 RX ORDER — INSULIN LISPRO 100 [IU]/ML
INJECTION, SOLUTION INTRAVENOUS; SUBCUTANEOUS ONCE
Status: CANCELLED | OUTPATIENT
Start: 2022-05-04 | End: 2022-05-04

## 2022-05-04 RX ORDER — SODIUM CHLORIDE, SODIUM LACTATE, POTASSIUM CHLORIDE, CALCIUM CHLORIDE 600; 310; 30; 20 MG/100ML; MG/100ML; MG/100ML; MG/100ML
25 INJECTION, SOLUTION INTRAVENOUS CONTINUOUS
Status: CANCELLED | OUTPATIENT
Start: 2022-05-04 | End: 2022-05-05

## 2022-05-04 RX ORDER — SODIUM CHLORIDE 0.9 % (FLUSH) 0.9 %
5-40 SYRINGE (ML) INJECTION AS NEEDED
Status: CANCELLED | OUTPATIENT
Start: 2022-05-04

## 2022-05-04 RX ORDER — SODIUM CHLORIDE 0.9 % (FLUSH) 0.9 %
5-40 SYRINGE (ML) INJECTION EVERY 8 HOURS
Status: CANCELLED | OUTPATIENT
Start: 2022-05-04

## 2022-05-05 ENCOUNTER — HOSPITAL ENCOUNTER (OUTPATIENT)
Dept: NON INVASIVE DIAGNOSTICS | Age: 72
Discharge: HOME | End: 2022-05-05
Attending: INTERNAL MEDICINE

## 2022-05-05 VITALS
DIASTOLIC BLOOD PRESSURE: 66 MMHG | HEIGHT: 59 IN | WEIGHT: 161 LBS | SYSTOLIC BLOOD PRESSURE: 137 MMHG | BODY MASS INDEX: 32.46 KG/M2

## 2022-05-05 DIAGNOSIS — I42.9 PRIMARY CARDIOMYOPATHY (HCC): ICD-10-CM

## 2022-05-05 LAB
ECHO AO ROOT DIAM: 3 CM
ECHO AO ROOT INDEX: 1.79 CM/M2
ECHO AV MEAN GRADIENT: 5 MMHG
ECHO AV MEAN VELOCITY: 1 M/S
ECHO AV PEAK GRADIENT: 8 MMHG
ECHO AV PEAK VELOCITY: 1.4 M/S
ECHO AV VTI: 33.9 CM
ECHO EST RA PRESSURE: 3 MMHG
ECHO LA VOL 2C: 40 ML (ref 22–52)
ECHO LA VOL 4C: 64 ML (ref 22–52)
ECHO LA VOL BP: 52 ML (ref 22–52)
ECHO LA VOL/BSA BIPLANE: 31 ML/M2 (ref 16–34)
ECHO LA VOLUME AREA LENGTH: 54 ML
ECHO LA VOLUME INDEX A2C: 24 ML/M2 (ref 16–34)
ECHO LA VOLUME INDEX A4C: 38 ML/M2 (ref 16–34)
ECHO LA VOLUME INDEX AREA LENGTH: 32 ML/M2 (ref 16–34)
ECHO LV E' LATERAL VELOCITY: 10 CM/S
ECHO LV E' SEPTAL VELOCITY: 8 CM/S
ECHO LV EDV A2C: 56 ML
ECHO LV EDV A4C: 88 ML
ECHO LV EDV BP: 73 ML (ref 56–104)
ECHO LV EDV INDEX A4C: 52 ML/M2
ECHO LV EDV INDEX BP: 43 ML/M2
ECHO LV EDV NDEX A2C: 33 ML/M2
ECHO LV EJECTION FRACTION A2C: 61 %
ECHO LV EJECTION FRACTION A4C: 63 %
ECHO LV EJECTION FRACTION BIPLANE: 63 % (ref 55–100)
ECHO LV ESV A2C: 22 ML
ECHO LV ESV A4C: 32 ML
ECHO LV ESV BP: 27 ML (ref 19–49)
ECHO LV ESV INDEX A2C: 13 ML/M2
ECHO LV ESV INDEX A4C: 19 ML/M2
ECHO LV ESV INDEX BP: 16 ML/M2
ECHO LV FRACTIONAL SHORTENING: 36 % (ref 28–44)
ECHO LV GLOBAL LONGITUDINAL STRAIN (GLS): -15.7 %
ECHO LV INTERNAL DIMENSION DIASTOLE INDEX: 2.98 CM/M2
ECHO LV INTERNAL DIMENSION DIASTOLIC: 5 CM (ref 3.9–5.3)
ECHO LV INTERNAL DIMENSION SYSTOLIC INDEX: 1.9 CM/M2
ECHO LV INTERNAL DIMENSION SYSTOLIC: 3.2 CM
ECHO LV IVSD: 1 CM (ref 0.6–0.9)
ECHO LV MASS 2D: 194.4 G (ref 67–162)
ECHO LV MASS INDEX 2D: 115.7 G/M2 (ref 43–95)
ECHO LV POSTERIOR WALL DIASTOLIC: 1.1 CM (ref 0.6–0.9)
ECHO LV RELATIVE WALL THICKNESS RATIO: 0.44
ECHO LVOT AREA: 3.1 CM2
ECHO LVOT DIAM: 2 CM
ECHO MV A VELOCITY: 0.74 M/S
ECHO MV E DECELERATION TIME (DT): 199 MS
ECHO MV E VELOCITY: 0.51 M/S
ECHO MV E/A RATIO: 0.69
ECHO MV E/E' LATERAL: 5.1
ECHO MV E/E' RATIO (AVERAGED): 5.74
ECHO MV E/E' SEPTAL: 6.38
ECHO PULMONARY ARTERY SYSTOLIC PRESSURE (PASP): 37 MMHG
ECHO RIGHT VENTRICULAR SYSTOLIC PRESSURE (RVSP): 37 MMHG
ECHO RV FREE WALL PEAK S': 9 CM/S
ECHO RV INTERNAL DIMENSION: 3.5 CM
ECHO RV TAPSE: 1.6 CM (ref 1.7–?)
ECHO TV REGURGITANT MAX VELOCITY: 2.9 M/S
ECHO TV REGURGITANT PEAK GRADIENT: 34 MMHG

## 2022-05-05 PROCEDURE — 93306 TTE W/DOPPLER COMPLETE: CPT

## 2022-05-11 ENCOUNTER — ANESTHESIA (OUTPATIENT)
Dept: ENDOSCOPY | Age: 72
End: 2022-05-11

## 2022-05-11 ENCOUNTER — HOSPITAL ENCOUNTER (OUTPATIENT)
Age: 72
Setting detail: OUTPATIENT SURGERY
Discharge: HOME | End: 2022-05-11
Attending: INTERNAL MEDICINE | Admitting: INTERNAL MEDICINE

## 2022-05-11 VITALS
DIASTOLIC BLOOD PRESSURE: 92 MMHG | HEART RATE: 75 BPM | SYSTOLIC BLOOD PRESSURE: 158 MMHG | RESPIRATION RATE: 16 BRPM | TEMPERATURE: 97.6 F | HEIGHT: 59 IN | OXYGEN SATURATION: 98 % | WEIGHT: 161 LBS | BODY MASS INDEX: 32.46 KG/M2

## 2022-05-11 PROCEDURE — 77030018831 HC SOL IRR H20 BAXT -A: Performed by: INTERNAL MEDICINE

## 2022-05-11 PROCEDURE — 88312 SPECIAL STAINS GROUP 1: CPT

## 2022-05-11 PROCEDURE — 74011000250 HC RX REV CODE- 250: Performed by: NURSE ANESTHETIST, CERTIFIED REGISTERED

## 2022-05-11 PROCEDURE — 76040000007: Performed by: INTERNAL MEDICINE

## 2022-05-11 PROCEDURE — 74011250636 HC RX REV CODE- 250/636: Performed by: NURSE ANESTHETIST, CERTIFIED REGISTERED

## 2022-05-11 PROCEDURE — 76060000032 HC ANESTHESIA 0.5 TO 1 HR: Performed by: INTERNAL MEDICINE

## 2022-05-11 PROCEDURE — 77030037186 HC VLV ENDOSC STRL DEFENDO DISP MVAT -A: Performed by: INTERNAL MEDICINE

## 2022-05-11 PROCEDURE — 77030042138 HC TBNG SPECIAL -A: Performed by: INTERNAL MEDICINE

## 2022-05-11 PROCEDURE — 88305 TISSUE EXAM BY PATHOLOGIST: CPT

## 2022-05-11 PROCEDURE — 43239 EGD BIOPSY SINGLE/MULTIPLE: CPT | Performed by: INTERNAL MEDICINE

## 2022-05-11 PROCEDURE — 2709999900 HC NON-CHARGEABLE SUPPLY: Performed by: INTERNAL MEDICINE

## 2022-05-11 PROCEDURE — 45378 DIAGNOSTIC COLONOSCOPY: CPT | Performed by: INTERNAL MEDICINE

## 2022-05-11 RX ORDER — SODIUM CHLORIDE, SODIUM LACTATE, POTASSIUM CHLORIDE, CALCIUM CHLORIDE 600; 310; 30; 20 MG/100ML; MG/100ML; MG/100ML; MG/100ML
INJECTION, SOLUTION INTRAVENOUS
Status: DISCONTINUED | OUTPATIENT
Start: 2022-05-11 | End: 2022-05-11 | Stop reason: HOSPADM

## 2022-05-11 RX ORDER — PROPOFOL 10 MG/ML
INJECTION, EMULSION INTRAVENOUS AS NEEDED
Status: DISCONTINUED | OUTPATIENT
Start: 2022-05-11 | End: 2022-05-11 | Stop reason: HOSPADM

## 2022-05-11 RX ORDER — LIDOCAINE HYDROCHLORIDE 20 MG/ML
INJECTION, SOLUTION INFILTRATION; PERINEURAL AS NEEDED
Status: DISCONTINUED | OUTPATIENT
Start: 2022-05-11 | End: 2022-05-11 | Stop reason: HOSPADM

## 2022-05-11 RX ADMIN — PROPOFOL 50 MG: 10 INJECTION, EMULSION INTRAVENOUS at 14:30

## 2022-05-11 RX ADMIN — PROPOFOL 50 MG: 10 INJECTION, EMULSION INTRAVENOUS at 14:41

## 2022-05-11 RX ADMIN — LIDOCAINE HYDROCHLORIDE 100 MG: 20 INJECTION, SOLUTION INFILTRATION; PERINEURAL at 14:24

## 2022-05-11 RX ADMIN — PROPOFOL 50 MG: 10 INJECTION, EMULSION INTRAVENOUS at 14:34

## 2022-05-11 RX ADMIN — SODIUM CHLORIDE, POTASSIUM CHLORIDE, SODIUM LACTATE AND CALCIUM CHLORIDE: 600; 310; 30; 20 INJECTION, SOLUTION INTRAVENOUS at 14:22

## 2022-05-11 RX ADMIN — PROPOFOL 50 MG: 10 INJECTION, EMULSION INTRAVENOUS at 14:38

## 2022-05-11 RX ADMIN — PROPOFOL 100 MG: 10 INJECTION, EMULSION INTRAVENOUS at 14:26

## 2022-05-11 NOTE — ANESTHESIA POSTPROCEDURE EVALUATION
Procedure(s):  ESOPHAGOGASTRODUODENOSCOPY (EGD) WTIH BXS  COLONOSCOPY.    total IV anesthesia    Anesthesia Post Evaluation      Multimodal analgesia: multimodal analgesia not used between 6 hours prior to anesthesia start to PACU discharge  Patient location during evaluation: bedside  Patient participation: complete - patient participated  Level of consciousness: awake and alert  Pain management: adequate  Airway patency: patent  Anesthetic complications: no  Cardiovascular status: acceptable and stable  Respiratory status: acceptable, spontaneous ventilation and room air  Hydration status: acceptable  Post anesthesia nausea and vomiting:  none  Final Post Anesthesia Temperature Assessment:  Normothermia (36.0-37.5 degrees C)      INITIAL Post-op Vital signs: No vitals data found for the desired time range.

## 2022-05-11 NOTE — ANESTHESIA PREPROCEDURE EVALUATION
Relevant Problems   No relevant active problems       Anesthetic History   No history of anesthetic complications            Review of Systems / Medical History  Patient summary reviewed, nursing notes reviewed and pertinent labs reviewed    Pulmonary  Within defined limits                 Neuro/Psych   Within defined limits           Cardiovascular    Hypertension              Exercise tolerance: >4 METS     GI/Hepatic/Renal  Within defined limits              Endo/Other    Diabetes    Obesity and arthritis     Other Findings              Physical Exam    Airway  Mallampati: III  TM Distance: 4 - 6 cm  Neck ROM: normal range of motion   Mouth opening: Normal     Cardiovascular  Regular rate and rhythm,  S1 and S2 normal,  no murmur, click, rub, or gallop  Rhythm: regular  Rate: normal         Dental  No notable dental hx       Pulmonary  Breath sounds clear to auscultation               Abdominal  GI exam deferred       Other Findings            Anesthetic Plan    ASA: 2  Anesthesia type: total IV anesthesia          Induction: Intravenous  Anesthetic plan and risks discussed with: Patient

## 2022-05-11 NOTE — PERIOP NOTES
Intrepreter #[id number] translated discharge instructions. Pt verbalized understanding of dc instructions and had no questions. Daughter was present.

## 2022-05-11 NOTE — PROCEDURES
EGD and Colonoscopy procedure notes    Date of procedure: 5/11/2022    Indication for procedure: Epigastric pain, history of H. pylori infection. Type of procedure: Upper endoscopy and biopsies to exclude H. pylori    Anesthesia classification: ASA class 2    Patient history and physical has been accomplished and documented. Patient is assessed and determined to be an appropriate candidate for planned procedure and sedation. The patient was assessed immediately prior to sedation. Sedation plan: MAC per anesthesia. Surgical assistant: Not applicable    Airway assessment: Range of motion: normal, mouth opening: Normal, visual obstruction: No.    PREOP EXAM:  Unchanged. VS: Reviewed  Gen: WDWN in NAD  CV: RRR, no murmur  Resp: CTAB  Abd: Soft, NTND, +BS  Extrem: No cyanosis or edema  Neuro: Awake and alert      Informed consent obtained: Yes. The indications, risks, including but not limited to bleeding, perforation, infection, death, potential for failure to visualize or diagnose neoplasia, alternatives, benefits, discussed in detail with the patient prior to the procedure. Patient understands and agrees to the procedure. Patient identity and procedure were verified, consent was obtained, and is consistent with the consent form in the patient's records. INSTRUMENT: Olympus upper endoscope    PROCEDURE FINDINGS:    OROPHARYNX: Normal    ESOPHAGUS:  Esophageal mucosa normal with no masses noted in the proximal, mid, and distal esophagus. No endoscopic features of eosinophilic esophagitis were noted. The Z-line was at 37 cm. and was normal.    No hiatal hernia was noted distally. STOMACH: Stomach was then evaluated including the cardia and fundus on retroflexion view. Evaluation of the gastric body, antrum, and pylorus were normal, including normal gastric mucosa with no masses, ulcers, or mucosal abnormalities.   Biopsies were obtained in the gastric body and antrum to exclude H. Pylori to check for eradication due to her prior history of ulcer disease and H. pylori infection. Retroflexion view of the cardia and fundus were normal.     DUODENUM:  The duodenal bulb and descending duodenum were then evaluated and found to be normal including no masses, ulcers, or or mucosal abnormalities. SPECIMENS: 1. Biopsies of gastric body and antrum to exclude H. pylori. ESTIMATED BLOOD LOSS:  None. COMPLICATIONS:  None     COMMENTS: none    Impression:  1. Normal upper endoscopy. 2.  Biopsies done in gastric body and antrum to check for H. pylori eradication due to previous infection. 3.  No upper GI etiology to explain her abdominal pain. Recommendations:    1. Continue present PPI therapy. 2.  Check pathology. Will notify patient with results. 3.  Follow up as needed. 4.  Further recommendations pending clinical course as needed. 5.  Proceed with colonoscopy. Handy Chase M.D. Colonoscopy procedure note    Date of service: 5/11/2022    Type: Screening    Indication for procedure: Colon cancer screening    Anesthesia classification: ASA class 2    Patient history and physical been accomplished and documented. Patient is assessed and determined to be appropriate candidate for planned procedure and sedation; patient reassessed immediately prior to sedation. Sedation plan: MAC per anesthesia    Surgical assistant: Not applicable    Informed consent obtained: Yes.  he indication, risks including but not limited to bleeding, perforation, infection, death, and potential failure to visual areas are diagnosed neoplasia, alternatives and benefits were discussed with the patient prior to the procedure. Patient identity and procedure was verified, absent was obtained, and is consistent with the consent form found in the patient's records.     PROCEDURE PERFORMED:  COLONOSCOPY  to the cecum with MAC     INSTRUMENT: Olympus colonoscope per nursing notes.    FINDINGS:    External anal lesions: Normal   Rectum: normal.   Retroflexion view: Grade 1 internal hemorrhoids. Sigmoid: normal except for severe diverticulosis. Descending Colon: normal   Transverse Colon: normal   Ascending Colon: normal   Cecum: normal   Terminal ileum: not evaluated     Specimens: None    Bowel preparation- adequate to detect small (5mm) polyps or larger. Estimated blood loss: none   Complications:  none   Cecal withdrawal time: 7 minutes. Comments: The colon was quite tortuous. This is due to her known large abdominal hernia with part of her transverse colon within the hernia. Impression:  1. Severe sigmoid diverticulosis. 2. Grade 1 internal hemorrhoids. 3. Tortuous colon due to large abdominal hernia with known transverse colon partially within the hernia sac. 4. No lower GI etiology noted to explain her abdominal pain. I am very suspicious this is due to the abdominal hernia with part of the transverse colon within the hernia. 5. No polyps or masses were seen. Recommendations:  1. Repeat colonoscopy in 10 years for screening. 2. Follow up as needed. 3. Would recommend if she has persistent pain that she be evaluated by a surgeon for possible abdominal hernia repair.        Елена Webber MD

## 2022-05-11 NOTE — DISCHARGE INSTRUCTIONS
1.  Continue present PPI therapy. 2.  Check pathology. Will notify patient with results. 3.  Follow up as needed. 4.  Further recommendations pending clinical course as needed. 5.  Repeat colonoscopy in 10 years for screening.

## 2022-05-11 NOTE — H&P
Date of Surgery Update:  Wen Bella was seen and examined. History and physical has been reviewed. The patient has been examined.  There have been no significant clinical changes since the completion of the originally dated History and Physical.    Signed By: Odalys Clarke MD     May 11, 2022 1:40 PM

## 2022-05-12 ENCOUNTER — TELEPHONE (OUTPATIENT)
Dept: GASTROENTEROLOGY | Age: 72
End: 2022-05-12

## 2022-05-12 ENCOUNTER — OFFICE VISIT (OUTPATIENT)
Dept: GASTROENTEROLOGY | Age: 72
End: 2022-05-12

## 2022-05-12 VITALS
HEART RATE: 68 BPM | DIASTOLIC BLOOD PRESSURE: 58 MMHG | WEIGHT: 161 LBS | SYSTOLIC BLOOD PRESSURE: 133 MMHG | BODY MASS INDEX: 32.52 KG/M2

## 2022-05-12 DIAGNOSIS — B96.81 HELICOBACTER PYLORI GASTRITIS: ICD-10-CM

## 2022-05-12 DIAGNOSIS — K57.30 DIVERTICULOSIS LARGE INTESTINE W/O PERFORATION OR ABSCESS W/O BLEEDING: ICD-10-CM

## 2022-05-12 DIAGNOSIS — K29.70 HELICOBACTER PYLORI GASTRITIS: ICD-10-CM

## 2022-05-12 DIAGNOSIS — R10.33 PERIUMBILICAL ABDOMINAL PAIN: Primary | ICD-10-CM

## 2022-05-12 DIAGNOSIS — E11.65 TYPE 2 DIABETES MELLITUS WITH HYPERGLYCEMIA, WITHOUT LONG-TERM CURRENT USE OF INSULIN (HCC): ICD-10-CM

## 2022-05-12 DIAGNOSIS — K27.9 PEPTIC ULCER DISEASE: ICD-10-CM

## 2022-05-12 DIAGNOSIS — K43.9 VENTRAL HERNIA WITHOUT OBSTRUCTION OR GANGRENE: ICD-10-CM

## 2022-05-12 PROCEDURE — 99213 OFFICE O/P EST LOW 20 MIN: CPT | Performed by: INTERNAL MEDICINE

## 2022-05-12 PROCEDURE — 3044F HG A1C LEVEL LT 7.0%: CPT | Performed by: INTERNAL MEDICINE

## 2022-05-12 NOTE — PROGRESS NOTES
Referring Physician:  Nathan Salguero NP     Chief Complaint: Colon cancer screening and history of ulcers    Date of service: 05/12/22     Subjective:     History of Present Illness:  Patient is a female / 66 y.o.  who is seen for evaluation for colon cancer screening and history of gastric ulcers. The history is via the patient records and her daughter who is her  because the patient's only speaks [facility] [place] The patient has GI complaints of abdominal pain and was found to have ulcer disease in the last 1.5 years in [facility].  She was also found to have H. Pylori. She was diagnosed with an EGD but she says she was not treated. [place] she was found to have positive H. Pylori antibody and was treated with antibiotics and a PPI ( presumably). No confirmation H. Pylori was eradicated. At the present time she denies any vomiting, melena or rectal bleeding. She does complain of epigastric pain and nausea at times. The abdominal pain is more so when she moves and she points to her periumbilical area. She is also due for colon cancer screening. Is recent been diagnosed with lymphoma. She had a CT scan of the abdomen and pelvis 3/2022 which revealed the following:    Impression  Right groin mass  Simple appearing right ovarian cyst  Diverticulosis  Ventral hernia containing nonobstructed segment of transverse colon    Ultrasound-guided biopsy of the soft tissue mass 3/2022 revealed a B-cell lymphoma. The patient denies nausea, vomiting, fever, chills, abdominal pain, reflux, dysphagia, change in bowel habits, diarrhea, constipation, weight loss, rectal bleeding, or melena. Last EGD- 2020. Last colonoscopy- never. Family history for GI disease is significant for no GI or liver disease. The patient has liver related risk factors including type 2 diabetes mellitus. Tobacco use-none. Alcohol use-none.     5/12/2022 visit: The patient underwent upper endoscopy and colonoscopy by me yesterday. EGD revealed: No abnormalities. Colonoscopy revealed: Moderate to severe left-sided diverticulosis in the sigmoid colon, small internal hemorrhoids, and a tortuous colon technically due to a known large abdominal hernia with part of her transverse colon and the hernia sac. At the completion of the procedure and at discharge the patient was doing well. She had no complaints. Last night apparently she had no nausea, vomiting, abdominal pain, ate dinner without difficulty and slept without difficulty. This morning, her daughter who called the office, stated that the patient was complaining of abdominal pain. She says the pain was worse when moving around such as walking and better when lying supine. She denies fever, chills, nausea, vomiting, rectal bleeding or melena. She was able to eat breakfast without difficulty this morning. She had an [employer] milkshake which included almond milk, papaya and 1 other item. The history is somewhat unclear as I get different answers when I asked but she subsequently developed the abdominal pain as described above. It was unclear whether the pain started after she had breakfast or prior as the story had changed during her conversation. She has not passed gas or had a bowel movement. Asked the daughter to bring the patient in today for evaluation. I reminded her as we discussed at her initial visit, her colonoscopy likely would be technically difficult and I may not be able to complete the procedure because of the large hernia with the transverse colon and the hernia sac. I was able to do so but also reminded them she was at increased risk for perforation for the same reason as well as the subsequent finding of severe sigmoid diverticulosis. Past medical history is significant for diabetes mellitus and hypertension and gastric ulcers. History is somewhat difficult due to language limitations.   [place]  Daughter speaks very good English and acts as her interpretor, although patient understand as some [place]. PMH:  Past Medical History:   Diagnosis Date    Diabetes ([address]     Hypertension         PSH:  Past Surgical History:   Procedure Laterality Date    COLONOSCOPY N/A 2022    COLONOSCOPY performed by [facility] at [facility] ENDOSCOPY    HX  SECTION      HX HYSTERECTOMY      IR BX BONE MARROW DIAGNOSTIC  2022    IR INSERT TUNL CVC W PORT OVER 5 YEARS  2022        Allergies: Allergies   Allergen Reactions    Penicillins Nausea Only and Vertigo        Home Medications:  Cannot display prior to admission medications because the patient has not been admitted in this contact. Hospital Medications:  Current Outpatient Medications   Medication Sig    OTHER Take 5 mg by mouth nightly. glibenclamida - [place]    OTHER Take 2 mg by mouth nightly. tolterodina - bladder - from [place]    indomethacin (INDOCIN) 25 mg capsule Take 25 mg by mouth two (2) times a day.  LOSARTAN PO Take 50 mg by mouth two (2) times a day.  verapamil HCl (VERAPAMIL PO) Take 80 mg by mouth two (2) times a day.  metformin HCl (METFORMIN PO) Take 850 mg by mouth two (2) times a day. No current facility-administered medications for this visit. Facility-Administered Medications Ordered in Other Visits   Medication Dose Route Frequency    0.9% sodium chloride infusion  20 mL/hr IntraVENous CONTINUOUS    heparin (porcine) 100 unit/mL injection 500 Units  500 Units InterCATHeter PRN        Social History:  Social History     Tobacco Use    Smoking status: Never Smoker    Smokeless tobacco: Never Used   Substance Use Topics    Alcohol use: Never        Pt denies any history of IV drug use, blood transfusions.     Family History:  Family History   Problem Relation Age of Onset    Stroke Mother     Cancer Sister     Diabetes Sister     Hypertension Sister    24 \[facility] [place] [facility]\"" Dementia Brother         Review of Systems:  A detailed 10 system ROS is obtained, with pertinent positives as listed above. All others are negative unless listed in history above. Constitutional denies fever chills, headache, or weight loss. Skin- denies lesions or rashes. HEENT- denies any vision or hearing problems, epistaxis, sore throat, or dental problems. Lungs- no shortness of breath or chest pain reported, no dyspnea on exertion. Cardiac- no palpitations or chest pain reported, including at rest or on exertion. GI-no abdominal pain, melena, rectal bleeding, reflux, dysphagia, jaundice, change in stool or urine color, constipation or diarrhea. Genitourinary -no dysuria or hematuria. Musculoskeletal-no muscle weakness or disuse or atrophy. Neurologic-no numbness, tingling, gait disturbance, or other abnormalities. Rheumatologic- patient denies any immune or rheumatologic diseases or symptoms. Endocrine- patient denies any endocrine abnormalities including thyroid disease or diabetes. Psychologic-patient denies depression, anxiety or emotional issues. No reported memory issues. Objective:     Physical Exam:  Vitals: BP (!) 133/58 (BP 1 Location: Right arm, BP Patient Position: Sitting)   Pulse 68   Wt 73 kg (161 lb)   BMI 32.52 kg/m²    Blood pressure and pulse are actually lower than they were prior to her procedures yesterday. Brief physical exam:    General-alert and oriented x3. Ambulation is normal.  Patient expresses no pain and moves without difficulty. HEENT - no obvious facial lesions. Abdomen-soft, nontender without hepatosplenomegaly or masses. Examination today revealed auscultation percussion elicited no abdominal pain. Showed no abdominal pain when distracted. When she was not distracted, she complained of periumbilical pain that worsened when she increased intra-abdominal pressure with a modified sit up.   This made the abdominal hernia obvious which I showed the patient and her daughter today. Neuro-no focal neurological deficits. Musculoskeletal-good range of motion and no focal deficits.       Laboratory:        Lab Results   Component Value Date     04/25/2022    K 3.8 04/25/2022     04/25/2022    CO2 29 04/25/2022    AGAP 5 04/25/2022     (H) 04/25/2022    BUN 13 04/25/2022    CREA 0.81 04/25/2022    BUCR 16 04/25/2022    GFRAA >60 04/25/2022    GFRNA >60 04/25/2022    CA 9.3 04/25/2022    TBILI 0.4 03/23/2022    AST 17 03/23/2022    ALT 21 03/23/2022    AP 82 03/23/2022    TP 7.1 03/23/2022    TP 6.6 03/23/2022    ALB 3.5 03/23/2022    GLOB 3.6 03/23/2022    AGRAT 1.0 03/23/2022    WBC 6.3 04/25/2022    RBC 4.19 (L) 04/25/2022    HGB 11.6 (L) 04/25/2022    HCT 35.0 04/25/2022    MCV 83.5 04/25/2022    MCH 27.7 04/25/2022    MCHC 33.1 04/25/2022    RDW 13.4 04/25/2022     04/25/2022    MPLV 9.1 (L) 04/25/2022    GRANS 62 04/25/2022    LYMPH 18 (L) 04/25/2022    MONOS 13 (H) 04/25/2022    EOS 6 (H) 04/25/2022    BASOS 1 04/25/2022    IG 0 04/25/2022    ANEU 3.9 04/25/2022    ABL 1.1 04/25/2022    ABM 0.8 04/25/2022    WHITNEY 0.4 04/25/2022    ABB 0.1 04/25/2022    AIG 0.0 04/25/2022   results  Lab Results   Component Value Date     04/25/2022    K 3.8 04/25/2022     04/25/2022    CO2 29 04/25/2022    AGAP 5 04/25/2022     (H) 04/25/2022    BUN 13 04/25/2022    CREA 0.81 04/25/2022    BUCR 16 04/25/2022    GFRAA >60 04/25/2022    GFRNA >60 04/25/2022    CA 9.3 04/25/2022    TBILI 0.4 03/23/2022    AST 17 03/23/2022    ALT 21 03/23/2022    AP 82 03/23/2022    TP 7.1 03/23/2022    TP 6.6 03/23/2022    ALB 3.5 03/23/2022    GLOB 3.6 03/23/2022    AGRAT 1.0 03/23/2022    WBC 6.3 04/25/2022    RBC 4.19 (L) 04/25/2022    HGB 11.6 (L) 04/25/2022    HCT 35.0 04/25/2022    MCV 83.5 04/25/2022    MCH 27.7 04/25/2022    MCHC 33.1 04/25/2022    RDW 13.4 04/25/2022     04/25/2022    MPLV 9.1 (L) 04/25/2022    GRANS 62 04/25/2022 LYMPH 18 (L) 04/25/2022    MONOS 13 (H) 04/25/2022    EOS 6 (H) 04/25/2022    BASOS 1 04/25/2022    IG 0 04/25/2022    ANEU 3.9 04/25/2022    ABL 1.1 04/25/2022    ABM 0.8 04/25/2022    WHITNEY 0.4 04/25/2022    ABB 0.1 04/25/2022    AIG 0.0 04/25/2022        CT scan of abdomen and pelvis -  CT Results (most recent):  Results from [place] encounter on 03/16/22    CT ABD PELV W CONT    Narrative  CT ABDOMEN AND PELVIS WITH CONTRAST    COMPARISON: None. INDICATIONS: Lymphoma. TECHNIQUE:  Following the uneventful administration of oral and 100cc of Isovue  300 intravenous contrast , volumetric data acquisition was performed of the  abdomen and pelvis on a multislice scanner and reconstructed in axial coronal  and sagittal planes    CT ABDOMEN FINDINGS:    Lung Bases: No focal hepatic lesions are evident. No biliary dilation. The  gallbladder is surgically absent. .    Liver/Gallbladder/Biliary: Normal.    Spleen: Normal. Tiny accessory splenule noted    Adrenal Glands: Normal.    Kidneys: Normal.    Pancreas: Normal.    Stomach, Small Bowel,  and Colon: Diverticulosis without findings of  diverticulitis. . A nonobstructed segment of transverse colon extends into a  ventral hernia just to the right of the umbilicus    Lymph Nodes: Normal in size and number in the abdomen and within the pelvis. A  right groin mass, apparently luisito histologically, is present measuring 7 x 6 x  9 cm. The abdominal aorta is unremarkable. The IVC is unremarkable. Peritoneal Spaces: No free fluid or free air is present. Abdominal wall: Umbilical region hernia containing transverse colon segment    Bladder: Unremarkable. The uterus is surgically absent. The left ovary is surgically absent. The right  ovary contains a 3.5 x 4 cm cyst.    Osseous Structures Of Abdomen And Pelvis: Unremarkable for age.     Impression  Right groin mass  Simple appearing right ovarian cyst  Diverticulosis  Ventral hernia containing nonobstructed segment of transverse colon      All CT scans at this facility are performed using dose optimization technique as  appropriate to the performed exam, to include automated exposure control,  adjustment of the mA and/or kV according to patient's size (Including  appropriate matching for site-specific examinations), or use of iterative  reconstruction technique. Abdominal US- No results  US Results (most recent):  Results from [facility] encounter on 04/08/22    US BREAST LT LIMITED=<3 QUAD    Narrative  DIGITAL DIAGNOSTIC LEFT MAMMOGRAM with 3-D imaging  LIMITED LEFT BREAST ULTRASOUND    INDICATION:  Screening callback for  asymmetry in the left breast    COMPARISON:  3/24/2022      MAMMOGRAM TECHNIQUE/FINDINGS: 2-D/3-D digital mammography was performed with CAD  of the left breast. Spot compression views and ML view were obtained.    -With additional imaging, the questioned asymmetry at the medial left breast  dissipates and appears to represent overlapping fibroglandular tissue.    -Also noted at the upper outer left breast is a partially obscured 9 mm mass. ULTRASOUND FINDINGS: Ultrasound evaluation was performed of the lateral and  medial left breast.    -At the medial left breast, there is normal background tissue without evidence  for solid mass.    -At the 2:00 position 5 cm from the nipple, there is a 9 x 5 x 4 mm hypoechoic  oval mass with some indistinct margins.    -No evidence for left axillary adenopathy. Impression  Recommend ultrasound-guided core biopsy of a mildly suspicious 9 mm mass at the  2:00 position left breast.  -Findings and recommendations were discussed with the patient and patient's  daughter (patient's daughter served as a  during this discussion). -The breast care coordinator will discuss further details with the patient and  the patient started.   She will also contact the patient's physician and assist  in coordinating biopsy scheduling, as necessary. BIRADS 4:  Suspicious  Breast density C: The breast is heterogeneously dense, which may obscure small  masses           MRI and MRCP- No results  MRI Results (most recent):  No results found for this or any previous visit. Assessment:     1. History of peptic ulcer disease. History is confusing- diagnosed via EGD but no treatment as reported. She has had H. pylori and this has apparently been treated since arriving in the [place] She has some epigastric pain that may or may not be related. Could also have lymphoma of the stomach. I feel she needs EGD to assess for persistent ulcer disease and eradication of H. Pylori. 5/12/2022: Her upper endoscopy was normal with no evidence of ulcer disease. Biopsies were obtained and are pending to exclude H. pylori to make sure this has been eradicated. 2. Colon cancer screening. Given the patient's age over 39, full evaluation of their colon is indicated exclude polyps and malignancy. 3. Abdominal pain. This could be due to recurrent ulcer disease, lymphoma the stomach, other. There was no endoscopic evidence on EGD yesterday of lymphoma or recurrent ulcer disease. However feel the most likely causes her large ventral hernia. 5/12/2022:  Her chronic abdominal pain I feel is due to her large ventral hernia with part of her colon in the hernia sac. Her acute pain for which she came in the office today as no suggestion clinically or by vital signs or even history that this is a perforation. She has no other symptoms including no nausea and vomiting. I feel her present acute pain is due to her large abdominal hernia with part of her transverse colon and the hernia sac. This would explain why it resolves when she is supine and worsen when she increases intra-abdominal pressure or sitting up. This is reproduced on exam today.   I suspect doing the colonoscopy did stretch her colon and her abdominal hernia making her muscles sore since she did require some splinting for the procedure and it was technically difficult. I discussed showed diagrams and pictures with the patient and her daughter today in detail what I felt was going on. They seem satisfied with this answer. I also told them that an abdominal binder would be helpful and surgical evaluation to see if she is a candidate at this time for abdominal hernia repair would be appropriate and they agreed. 4. Nausea. I feel her nausea is most likely due to either a large ventral hernia, her diabetes, or her B-cell lymphoma. 5. Type 2 diabetes mellitus. She is on Metformin for this problem. 6. B-cell lymphoma. She could have involvement of the colon as well potentially. 7. Large ventral hernia. She has part of her transverse colon and the ventral hernia. .  This may make performing colonoscopy very difficult as far as having a complete colonoscopy. If it cannot be performed to the cecum, then she will need an air-contrast barium enema to look at the rest of her colon. Plan:     1. An order was placed for an abdominal binder at a local pharmacy for them to  today. 2. Surgical referral for evaluation for abdominal hernia repair was made. 3. I told him today that it is okay to use a heating pad as needed to her abdomen. 4. I also told them today that this pain will likely continue as long as she has the abdominal hernia and both a binder and/or surgery would be beneficial.  5. Patient will be notified about the gastric biopsy results for H. pylori when they are available. 6. They are to contact me with any further questions or problems. 7. Further recommendations pending the patient's clinical course, if needed. 8. The patient will follow up with me as needed.       Alicia Duran MD

## 2022-05-12 NOTE — PROGRESS NOTES
Kavitha Gustafson presents today for   Chief Complaint   Patient presents with    Follow-up     Patient had a colonoscopy yesterday. When patient is laying down there is no pain when try to sit up there is excruating pain. Right now pain level is a 8. Patient hasnt had a bowel movement yet but passed some gas        Is someone accompanying this pt? Yes ana paula    Is the patient using any DME equipment during OV? Yes a cane    Depression Screening:  3 most recent PHQ Screens 5/12/2022   Little interest or pleasure in doing things Not at all   Feeling down, depressed, irritable, or hopeless Not at all   Total Score PHQ 2 0       Learning Assessment:  No flowsheet data found. Fall Risk  Fall Risk Assessment, last 12 mths 5/12/2022   Able to walk? Yes   Fall in past 12 months? 0   Do you feel unsteady? 0   Are you worried about falling 0       Coordination of Care:  1. Have you been to the ER, urgent care clinic since your last visit? Hospitalized since your last visit? *no    2. Have you seen or consulted any other health care providers outside of the [address] [place] [address] since your last visit? Include any pap smears or colon screening.  Yes, PCP Yolande Yoder

## 2022-05-12 NOTE — TELEPHONE ENCOUNTER
Chart reviewed. The patient's daughter called the office today because the patient was complaining of abdominal pain. She had upper endoscopy and colonoscopy yesterday. The colonoscopy was technically very difficult because she has a known large abdominal hernia with part of her transverse colon and the hernia sac. No polyps or biopsies were done. Postoperatively, the patient was fine and had no abdominal pain or other symptoms. Last night per the daughter, she had no abdominal pain, nausea, vomiting, ate, dinner without any difficulty and slept fine without any pain or problems. This morning, the patient had an [employer] shake which included papaya, almond milk, spinach, and 1 other item. She reports since this morning, the patient states she has had abdominal pain. It is worse when she is walking or moving around and improves when she is lying down. She is not passed any gas. She denies fever, chills, nausea, or vomiting. She has had no rectal bleeding or melena. I discussed the above with the patient's daughter. I think this is somehow related to her having the almond milkshake causing gas bloating crampy pain and spasm. However, a delayed colon perforation is a possibility given the technical difficulty with her procedure and her known abdominal hernia. We discussed this possibility previously when she was seen in the office. So they are aware. I asked the patient's daughter to bring her in today at 3 PM and I will evaluate her abdomen. Depending on findings, she may need abdominal x-rays, CT scan of the abdomen and pelvis, or hospital admission or outpatient therapy. This could also be soreness related to her large abdominal hernia. Thus, she will come in for evaluation and further treatment as appropriate.

## 2022-05-17 ENCOUNTER — TELEPHONE (OUTPATIENT)
Dept: FAMILY MEDICINE CLINIC | Facility: CLINIC | Age: 72
End: 2022-05-17

## 2022-05-17 ENCOUNTER — HOSPITAL ENCOUNTER (OUTPATIENT)
Dept: INFUSION THERAPY | Age: 72
Discharge: HOME | End: 2022-05-17

## 2022-05-17 DIAGNOSIS — M17.0 PRIMARY OSTEOARTHRITIS OF BOTH KNEES: ICD-10-CM

## 2022-05-17 DIAGNOSIS — E13.9 DIABETES 1.5, MANAGED AS TYPE 2 (HCC): ICD-10-CM

## 2022-05-17 DIAGNOSIS — R22.41 MASS OF LEG, RIGHT: ICD-10-CM

## 2022-05-17 DIAGNOSIS — E55.9 VITAMIN D DEFICIENCY: ICD-10-CM

## 2022-05-17 DIAGNOSIS — C83.35 DIFFUSE LARGE B-CELL LYMPHOMA OF LYMPH NODES OF LOWER EXTREMITY (HCC): ICD-10-CM

## 2022-05-17 DIAGNOSIS — K27.9 PUD (PEPTIC ULCER DISEASE): ICD-10-CM

## 2022-05-17 DIAGNOSIS — I15.8 OTHER SECONDARY HYPERTENSION: ICD-10-CM

## 2022-05-17 DIAGNOSIS — N63.0 BREAST NODULE: ICD-10-CM

## 2022-05-17 LAB
25(OH)D3 SERPL-MCNC: 37.9 NG/ML (ref 30–100)
ALBUMIN SERPL-MCNC: 3.4 G/DL (ref 3.4–5)
ALBUMIN/GLOB SERPL: 1 {RATIO} (ref 0.8–1.7)
ALP SERPL-CCNC: 117 U/L (ref 45–117)
ALT SERPL-CCNC: 17 U/L (ref 13–56)
ANION GAP SERPL CALC-SCNC: 4 MMOL/L (ref 3–18)
AST SERPL-CCNC: 11 U/L (ref 10–38)
BASO+EOS+MONOS # BLD AUTO: 0.8 K/UL (ref 0–2.3)
BASO+EOS+MONOS NFR BLD AUTO: 14 % (ref 0.1–17)
BILIRUB SERPL-MCNC: 0.3 MG/DL (ref 0.2–1)
BUN SERPL-MCNC: 15 MG/DL (ref 7–18)
BUN/CREAT SERPL: 20 (ref 12–20)
CALCIUM SERPL-MCNC: 9.4 MG/DL (ref 8.5–10.1)
CHLORIDE SERPL-SCNC: 104 MMOL/L (ref 100–111)
CO2 SERPL-SCNC: 32 MMOL/L (ref 21–32)
CREAT SERPL-MCNC: 0.76 MG/DL (ref 0.6–1.3)
DIFFERENTIAL METHOD BLD: ABNORMAL
ERYTHROCYTE [DISTWIDTH] IN BLOOD BY AUTOMATED COUNT: 13 % (ref 11.5–14.5)
GLOBULIN SER CALC-MCNC: 3.4 G/DL (ref 2–4)
GLUCOSE SERPL-MCNC: 124 MG/DL (ref 74–99)
HCT VFR BLD AUTO: 36.3 % (ref 36–48)
HGB BLD-MCNC: 11.2 G/DL (ref 12–16)
LDH SERPL L TO P-CCNC: 238 U/L (ref 81–234)
LYMPHOCYTES # BLD: 0.8 K/UL (ref 1.1–5.9)
LYMPHOCYTES NFR BLD: 15 % (ref 14–44)
MCH RBC QN AUTO: 26.5 PG (ref 25–35)
MCHC RBC AUTO-ENTMCNC: 30.9 G/DL (ref 31–37)
MCV RBC AUTO: 85.8 FL (ref 78–102)
NEUTS SEG # BLD: 3.8 K/UL (ref 1.8–9.5)
NEUTS SEG NFR BLD: 71 % (ref 40–70)
PLATELET # BLD AUTO: 242 K/UL (ref 140–440)
POTASSIUM SERPL-SCNC: 4.5 MMOL/L (ref 3.5–5.5)
PROT SERPL-MCNC: 6.8 G/DL (ref 6.4–8.2)
RBC # BLD AUTO: 4.23 M/UL (ref 4.1–5.1)
SODIUM SERPL-SCNC: 140 MMOL/L (ref 136–145)
URATE SERPL-MCNC: 3.9 MG/DL (ref 2.6–7.2)
WBC # BLD AUTO: 5.4 K/UL (ref 4.5–13)

## 2022-05-17 PROCEDURE — 36415 COLL VENOUS BLD VENIPUNCTURE: CPT

## 2022-05-17 PROCEDURE — 84550 ASSAY OF BLOOD/URIC ACID: CPT

## 2022-05-17 PROCEDURE — 80053 COMPREHEN METABOLIC PANEL: CPT

## 2022-05-17 PROCEDURE — 82306 VITAMIN D 25 HYDROXY: CPT

## 2022-05-17 PROCEDURE — 85025 COMPLETE CBC W/AUTO DIFF WBC: CPT

## 2022-05-17 PROCEDURE — 83615 LACTATE (LD) (LDH) ENZYME: CPT

## 2022-05-17 NOTE — TELEPHONE ENCOUNTER
Spoke with patient daughter. Patient schedule for an follow up on 5/18/22 at [facility].

## 2022-05-17 NOTE — TELEPHONE ENCOUNTER
----- Message from Dot Russell NP sent at 5/13/2022  3:16 PM EDT -----  Regarding: Appointment  Good afternoon,     Patient's daughter contacted me to request an appointment. Gave her the number to the [facility], not sure if she got through or not. If not, can you please call her to get her scheduled. Thank you.      Pamela Kelley

## 2022-05-17 NOTE — PROGRESS NOTES
EGD pathology results-there is mild gastritis present, no evidence of Helicobacter pylori. Please notify patient with results.

## 2022-05-18 ENCOUNTER — OFFICE VISIT (OUTPATIENT)
Dept: FAMILY MEDICINE CLINIC | Facility: CLINIC | Age: 72
End: 2022-05-18

## 2022-05-18 VITALS
HEIGHT: 59 IN | WEIGHT: 165 LBS | OXYGEN SATURATION: 97 % | BODY MASS INDEX: 33.26 KG/M2 | SYSTOLIC BLOOD PRESSURE: 122 MMHG | HEART RATE: 85 BPM | TEMPERATURE: 38.8 F | DIASTOLIC BLOOD PRESSURE: 76 MMHG | RESPIRATION RATE: 20 BRPM

## 2022-05-18 DIAGNOSIS — M17.0 PRIMARY OSTEOARTHRITIS OF BOTH KNEES: ICD-10-CM

## 2022-05-18 DIAGNOSIS — E11.9 DIABETES MELLITUS WITHOUT COMPLICATION (HCC): ICD-10-CM

## 2022-05-18 DIAGNOSIS — I10 ESSENTIAL HYPERTENSION: ICD-10-CM

## 2022-05-18 DIAGNOSIS — Z76.0 MEDICATION REFILL: Primary | ICD-10-CM

## 2022-05-18 PROCEDURE — 3044F HG A1C LEVEL LT 7.0%: CPT | Performed by: CLINICAL NURSE SPECIALIST

## 2022-05-18 PROCEDURE — 99213 OFFICE O/P EST LOW 20 MIN: CPT | Performed by: CLINICAL NURSE SPECIALIST

## 2022-05-18 RX ORDER — LOSARTAN POTASSIUM 50 MG/1
50 TABLET ORAL 2 TIMES DAILY
Qty: 30 TABLET | Refills: 2 | Status: SHIPPED | OUTPATIENT
Start: 2022-05-18 | End: 2022-10-26 | Stop reason: SDUPTHER

## 2022-05-18 RX ORDER — METFORMIN HYDROCHLORIDE 850 MG/1
850 TABLET ORAL 2 TIMES DAILY
Qty: 30 TABLET | Refills: 2 | Status: SHIPPED | OUTPATIENT
Start: 2022-05-18

## 2022-05-18 RX ORDER — VERAPAMIL HYDROCHLORIDE 80 MG/1
80 TABLET ORAL 2 TIMES DAILY
Qty: 30 TABLET | Refills: 2 | Status: SHIPPED | OUTPATIENT
Start: 2022-05-18

## 2022-05-18 NOTE — PATIENT INSTRUCTIONS
Diabetes tipo 2: Instrucciones de cuidado  Type 2 Diabetes: Care Instructions  Instrucciones de cuidado     La diabetes tipo 2 es matilde enfermedad que se desarrolla cuando los tejidos del organismo no pueden utilizar la insulina de [facility]. Con el tiempo, el páncreas no puede producir suficiente insulina. La insulina es matilde hormona que ayuda a las células del cuerpo a utilizar el azúcar (glucosa) para obtener energía. También ayuda al cuerpo a almacenar el azúcar adicional en las células de los músculos, la grasa y [place]. Sin la insulina, el azúcar no puede entrar en las células para hacer vuong trabajo. En cambio, se queda en [employer]. [place] puede causar [employer] de azúcar en la sebas. Matilde persona tiene diabetes cuando vuong nivel de azúcar en la sebas permanece demasiado alto, marty demasiado Amanda. Con el tiempo, la diabetes puede provocar enfermedades del corazón, los vasos sanguíneos, los nervios, los riñones y los ojos. Es posible que pueda controlar el azúcar en la sebas al bajar de [place], comer matilde dieta saludable y hacer ejercicio a diario. También podría tener que usar insulina u otros medicamentos para la diabetes. La atención de seguimiento es matilde parte clave de vuong tratamiento y seguridad. Asegúrese de hacer y acudir a todas las citas, y llame a vuong médico si está teniendo problemas. También es matilde buena idea saber los resultados de thor exámenes y mantener matilde lista de los medicamentos que ambrocio. ¿Cómo puede cuidarse en el hogar? · Mantenga vuong azúcar en la sebas dentro del nivel recomendado (el cual usted fijó con vuong médico). ? Los carbohidratos, la tariq principal de combustible del organismo, afectan el azúcar en la sebas más que cualquier otro nutriente. Los carbohidratos se [employer] frutas, las verduras, la [place] y el yogur.  También se encuentran en los panes, los cereales, las verduras jeannie las liz y [place], y en alimentos azucarados jeannie las golosinas y [place] pasteles. Siga vuong plan de comidas para saber cuántos carbohidratos debe comer en cada comida y [place]. ? Trate de hacer 30 minutos de ejercicio la mayoría de los días o, [place], todos los días de la [place]. Caminar es matilde buena opción. También saad vez desee hacer otras actividades, jeannie correr, nadar, montar en bicicleta o jugar al tenis o practicar deportes en equipo. Trate de hacer ejercicios de fortalecimiento muscular al menos 2 veces a la semana. ? [place] thor medicamentos exactamente jeannie se los recetaron. Llame a vuong médico si joseph que está teniendo un problema con vuong medicamento. Recibirá [employer] medicamentos específicos recetados por el médico.  · Revise el azúcar en vuong sebas con tanta frecuencia jeannie lo recomiende vuong médico. Es importante seguir con atención cualquier síntoma que tenga, jeannie bajo nivel de azúcar en la sebas, y cualquier [place], la dieta o el uso de [place]. · Hable con vuong médico antes de empezar a papi [employer]. La aspirina puede ayudar a determinadas personas a reducir vuong riesgo de tener un ataque cardíaco o un ataque cerebral. Chaka papi aspirina no es adecuado para todo el TRENGEREID, debido a que puede causar sangrado grave. · No fume. Si necesita ayuda para dejar de fumar, hable con vuong médico sobre programas y medicamentos para dejar de fumar. Estos pueden aumentar thor probabilidades de dejar el hábito para siempre. · Mantenga el colesterol y la presión arterial a niveles normales. Vassie Nurys necesite papi wily o más medicamentos para alcanzar thor objetivos. Tómelos exactamente de acuerdo con las indicaciones. No deje de papi ni cambie un medicamento sin hablar antes con vuong médico.  ¿Cuándo debe pedir ayuda? Llame al 911 en cualquier momento que considere que necesita atención de [place]. Por ejemplo, llame si:    · Se desmayó (perdió el conocimiento), o de repente se siente muy somnoliento (con sueño) o confuso.  (Bobby Valdeser un nivel muy bajo de azúcar en la sebas). Llame a vuong médico ahora mismo o busque atención médica inmediata si:    · Vuong nivel de azúcar en la sebas es de 300 mg/dL o es más alto que el nivel que vuong médico ha establecido para usted.     · Presenta síntomas de un bajo nivel de azúcar en la sebas, tales jeannie:  ? Sudoración. ? Sentirse nervioso, tembloroso y débil. ? Hambre extrema y náuseas leves. ? Reyes Jennifer y dolor de [place]. ? [place] ? Confusión. Preste especial atención a los cambios en vuong linda y asegúrese de comunicarse con vuong médico si:    · A menudo tiene problemas para controlar el azúcar en la sebas.     · Tiene síntomas de problemas a yary plazo causados por la diabetes, tales jeannie:  ? Cambios nuevos en la visión. ? [employer], entumecimiento u hormigueo en las matteo o los pies. ? Problemas en la piel. ¿Dónde puede encontrar más información en inglés? Vaya a http://www.gray.com/  Escriba C553 en la búsqueda para aprender más acerca de \"Diabetes tipo 2: Instrucciones de cuidado. \"  Revisado: 28 julio, 2021               Versión del contenido: 13.2  © 2006-2022 Healthwise, Incorporated. Las instrucciones de cuidado fueron adaptadas bajo licencia por [employer] (which disclaims liability or warranty for this information). Si usted tiene [place] afección médica o sobre estas instrucciones, siempre pregunte a vuong profesional de linda. Healthwise, Incorporated niega toda garantía o responsabilidad por vuong uso de esta información.

## 2022-05-18 NOTE — PROGRESS NOTES
MARIA ELENA Hernández is a 66 y.o. female being seen today for   Chief Complaint   Patient presents with    Follow-up   .  she states that she is doing alright. Overall, feels fine. Occasionally has arthritic knee pain bilaterally, but now that the weather has warmed up it has become less frequent. Daughter with her. Recently saw GI for upper GI, confirmed no H. Pylori, but was advised that her hernia has grown significantly and needs to be addressed in the near future. Was also advised that no current indication for colonoscopy. Has an appointment scheduled with general surgery next week for evaluation of hernia. Also has a f/u with the oncologist next week. Had her port placed already so will likely start chemo in the near future. Scheduled to have a breast biopsy d/t suspicious left breast mass. Has been monitoring her blood sugars daily before meals, wide range of readings. Most recent A1c was 6.8. Tolerating her metformin. States that she is also taking glynase, but wants to double check dosage. Only recently has gotten low on medications, brought all prescriptions [place] from [address]. States that she was taking chlorthalidone previously, but ran out. Does not want to resume this as her BP has been normal with current regimen. Requesting a refill of losartan and verapamil also. Has now been approved for financial assistance, completely covered for now. This has been a huge relief. Past Medical History:   Diagnosis Date    Diabetes ([address]     Hypertension          ROS  Patient states that she is feeling well. Denies complaints of chest pain, shortness of breath, swelling of legs, dizziness or weakness. she denies nausea, vomiting or diarrhea. Current Outpatient Medications   Medication Sig    losartan (COZAAR) 50 mg tablet Take 1 Tablet by mouth two (2) times a day.  verapamiL (CALAN) 80 mg tablet Take 1 Tablet by mouth two (2) times a day.     metFORMIN (GLUCOPHAGE) 850 mg tablet Take 1 Tablet by mouth two (2) times a day.  OTHER Take 5 mg by mouth nightly. glibenclamida - diabetes from [place]    OTHER Take 2 mg by mouth nightly. tolterodina - bladder - from [place]    indomethacin (INDOCIN) 25 mg capsule Take 25 mg by mouth two (2) times a day. No current facility-administered medications for this visit. Facility-Administered Medications Ordered in Other Visits   Medication Dose Route Frequency    0.9% sodium chloride infusion  20 mL/hr IntraVENous CONTINUOUS    heparin (porcine) 100 unit/mL injection 500 Units  500 Units InterCATHeter PRN       PE  Visit Vitals  /76 (BP 1 Location: Right arm, BP Patient Position: Sitting, BP Cuff Size: Adult long)   Pulse 85   Temp (!) 38.8 °F (3.8 °C) (Temporal)   Resp 20   Ht 4' 11\" (1.499 m)   Wt 165 lb (74.8 kg)   SpO2 97%   BMI 33.33 kg/m²        A&O x4, appropriate mood. Well developed, well nourished. Nml respiratory effort. Assessment and Plan:      Refills to pharmacy on file. Instructed to call with [facility]. Commended on maintaining all follow-up care. Commended on monitoring BS and BP, encouraged to continue. Advised that best to check A1c 3 mos apart for accuracy. Reinforced diabetic friendly diet. Instructed to call with any questions or concerns. Encouraged to return in 1-2 mos for labs, and PRN. ICD-10-CM ICD-9-CM    1. Medication refill  Z76.0 V68.1    2. Essential hypertension  I10 401.9    3. Primary osteoarthritis of both knees  M17.0 715.16    4.  Diabetes mellitus without complication ([facility]  N66.1 250.00            Annie Carmona NP

## 2022-05-20 NOTE — PROGRESS NOTES
I have reviewed the attatched progress note and I agree with the plan and medical decision making as outlined by Jesus Dozier MD

## 2022-05-23 ENCOUNTER — HOSPITAL ENCOUNTER (OUTPATIENT)
Dept: ULTRASOUND IMAGING | Age: 72
Discharge: HOME | End: 2022-05-23

## 2022-05-23 ENCOUNTER — OFFICE VISIT (OUTPATIENT)
Dept: SURGERY | Age: 72
End: 2022-05-23

## 2022-05-23 ENCOUNTER — HOSPITAL ENCOUNTER (OUTPATIENT)
Dept: MAMMOGRAPHY | Age: 72
Discharge: HOME | End: 2022-05-23

## 2022-05-23 VITALS
TEMPERATURE: 97.4 F | HEART RATE: 76 BPM | SYSTOLIC BLOOD PRESSURE: 164 MMHG | BODY MASS INDEX: 34.68 KG/M2 | HEIGHT: 58 IN | WEIGHT: 165.2 LBS | OXYGEN SATURATION: 98 % | DIASTOLIC BLOOD PRESSURE: 83 MMHG

## 2022-05-23 DIAGNOSIS — R92.8 ABNORMAL MAMMOGRAM: ICD-10-CM

## 2022-05-23 DIAGNOSIS — K43.2 INCISIONAL HERNIA, WITHOUT OBSTRUCTION OR GANGRENE: Primary | ICD-10-CM

## 2022-05-23 DIAGNOSIS — N63.0 LUMP OR MASS IN BREAST: ICD-10-CM

## 2022-05-23 PROCEDURE — 99214 OFFICE O/P EST MOD 30 MIN: CPT | Performed by: SURGERY

## 2022-05-23 PROCEDURE — 74011000250 HC RX REV CODE- 250

## 2022-05-23 PROCEDURE — 88305 TISSUE EXAM BY PATHOLOGIST: CPT

## 2022-05-23 PROCEDURE — 19083 BX BREAST 1ST LESION US IMAG: CPT

## 2022-05-23 PROCEDURE — 77065 DX MAMMO INCL CAD UNI: CPT

## 2022-05-23 RX ORDER — LIDOCAINE HYDROCHLORIDE AND EPINEPHRINE 10; 10 MG/ML; UG/ML
1.5 INJECTION, SOLUTION INFILTRATION; PERINEURAL
Status: COMPLETED | OUTPATIENT
Start: 2022-05-23 | End: 2022-05-23

## 2022-05-23 RX ORDER — LIDOCAINE HYDROCHLORIDE 10 MG/ML
5 INJECTION, SOLUTION EPIDURAL; INFILTRATION; INTRACAUDAL; PERINEURAL
Status: COMPLETED | OUTPATIENT
Start: 2022-05-23 | End: 2022-05-23

## 2022-05-23 RX ADMIN — LIDOCAINE HYDROCHLORIDE AND EPINEPHRINE 15 MG: 10; 10 INJECTION, SOLUTION INFILTRATION; PERINEURAL at 13:56

## 2022-05-23 RX ADMIN — LIDOCAINE HYDROCHLORIDE 5 ML: 10 INJECTION, SOLUTION EPIDURAL; INFILTRATION; INTRACAUDAL; PERINEURAL at 13:55

## 2022-05-23 NOTE — PROGRESS NOTES
Clarissa Graham is a 66 y.o. female (: 1950) presenting to address:    Chief Complaint   Patient presents with    Surgical Follow-up     ventral hernia/ Ct abd pelk 3/16/22       Medication list and allergies have been reviewed with Clarissa Graham and updated as of today's date. I have gone over all Medical, Surgical and Social History with Clarissa Graham and updated/added the information accordingly. 1. Have you been to the ER, Urgent Care or Hospitalized since your last visit? NO      2. Have you followed up with your PCP or any other Physicians since your procedure/ last office visit?    NO

## 2022-05-23 NOTE — PROGRESS NOTES
General Surgery Consult      Aga Dove  Admit date: (Not on file)    MRN: [id number]     : 1950     Age: 66 y.o. Attending Physician: Robin Huston MD, [facility]      History of Present Illness:     Aga Dove is a 66 y.o. female who was referred to me for evaluation of an incisional hernia. I have seen the patient about 2 months ago for a groin mass and there was suspicion of lymphoma and currently she has been having abdominal pain. The patient is Moh[place]-speaking so we had to translate through her daughter and she stated that the pain has been there for years and she had a  in the past and she had a bulge at this area that is causing symptoms. She has a history of ulcer but her current endoscopy did not show any significant ulceration and her pain is happening at the site of the bulge so she was seen by the GI team and Dr. Richard Worthington recommended that she have her hernia fixed because he believed her pain is from the hernia itself. The patient stated that the hernia has been getting larger. She denies any nausea or vomiting.      Patient Active Problem List    Diagnosis Date Noted    Primary osteoarthritis of both knees 2022    Diabetes mellitus without complication ([facility] [address]     Helicobacter pylori antibody positive 2022    Lump of skin of right lower extremity 2022     Past Medical History:   Diagnosis Date    Diabetes ([address]     Hypertension       Past Surgical History:   Procedure Laterality Date    COLONOSCOPY N/A 2022    [facility]COPY    HX  SECTION      HX HYSTERECTOMY      IR BX BONE MARROW DIAGNOSTIC  2022    IR INSERT TUNL CVC W PORT OVER 5 YEARS  2022      Social History     Tobacco Use    Smoking status: Never Smoker    Smokeless tobacco: Never Used   Substance Use Topics    Alcohol use: Never      Social History     Tobacco Use   Smoking Status Never Smoker   Smokeless Tobacco Never Used     Family History   Problem Relation Age of Onset    Stroke Mother     Cancer Sister     Diabetes Sister     Hypertension Sister     Dementia Brother       Current Outpatient Medications   Medication Sig    losartan (COZAAR) 50 mg tablet Take 1 Tablet by mouth two (2) times a day.  verapamiL (CALAN) 80 mg tablet Take 1 Tablet by mouth two (2) times a day.  metFORMIN (GLUCOPHAGE) 850 mg tablet Take 1 Tablet by mouth two (2) times a day.  OTHER Take 5 mg by mouth nightly. glibenclamida - diabetes from [place]    OTHER Take 2 mg by mouth nightly. tolterodina - bladder - from [place]    indomethacin (INDOCIN) 25 mg capsule Take 25 mg by mouth two (2) times a day. No current facility-administered medications for this visit.      Facility-Administered Medications Ordered in Other Visits   Medication Dose Route Frequency    0.9% sodium chloride infusion  20 mL/hr IntraVENous CONTINUOUS    heparin (porcine) 100 unit/mL injection 500 Units  500 Units InterCATHeter PRN      Allergies   Allergen Reactions    Penicillins Nausea Only and Vertigo        Review of Systems:  Constitutional: negative  Eyes: negative  Ears, Nose, Mouth, Throat, and Face: negative  Respiratory: negative  Cardiovascular: negative  Gastrointestinal: positive for abdominal pain  Genitourinary:negative  Integument/Breast: negative  Hematologic/Lymphatic: negative  Musculoskeletal:negative  Neurological: negative  Behavioral/Psychiatric: negative  Endocrine: negative  Allergic/Immunologic: negative    Objective:     Visit Vitals  BP (!) 164/83   Pulse 76   Temp 97.4 °F (36.3 °C) (Temporal)   Ht 4' 10\" (1.473 m)   Wt 74.9 kg (165 lb 3.2 oz)   SpO2 98%   BMI 34.53 kg/m²       Physical Exam:      General:  in no apparent distress, alert and oriented times 3   Eyes:  conjunctivae and sclerae normal, pupils equal, round, reactive to light   Throat & Neck: no erythema or exudates noted and neck supple and symmetrical; no palpable masses   Lungs:   clear to auscultation bilaterally   Heart:  Regular rate and rhythm   Abdomen:   rounded and obese, soft, nontender, nondistended, no masses or organomegaly. There is an incisional hernia located in the periumbilical area close to the scar from her . It is nonreducible. Extremities: extremities normal, atraumatic, no cyanosis or edema   Skin: Normal.       Imaging and Lab Review:     CBC:   Lab Results   Component Value Date/Time    WBC 5.4 2022 09:35 AM    RBC 4.23 2022 09:35 AM    HGB 11.2 (L) 2022 09:35 AM    HCT 36.3 2022 09:35 AM    PLATELET 675  09:35 AM     BMP:   Lab Results   Component Value Date/Time    Glucose 124 (H) 2022 09:35 AM    Sodium 140 2022 09:35 AM    Potassium 4.5 2022 09:35 AM    Chloride 104 2022 09:35 AM    CO2 32 2022 09:35 AM    BUN 15 2022 09:35 AM    Creatinine 0.76 2022 09:35 AM    Calcium 9.4 2022 09:35 AM     CMP:  Lab Results   Component Value Date/Time    Glucose 124 (H) 2022 09:35 AM    Sodium 140 2022 09:35 AM    Potassium 4.5 2022 09:35 AM    Chloride 104 2022 09:35 AM    CO2 32 2022 09:35 AM    BUN 15 2022 09:35 AM    Creatinine 0.76 2022 09:35 AM    Calcium 9.4 2022 09:35 AM    Anion gap 4 2022 09:35 AM    BUN/Creatinine ratio 20 2022 09:35 AM    Alk. phosphatase 117 2022 09:35 AM    Protein, total 6.8 2022 09:35 AM    Albumin 3.4 2022 09:35 AM    Globulin 3.4 2022 09:35 AM    A-G Ratio 1.0 2022 09:35 AM       No results found for this or any previous visit (from the past 24 hour(s)). images and reports reviewed    Assessment:   Christopher Hernández is a 66 y.o. female is presenting with a incisional hernia that is symptomatic.   The patient has multiple medical conditions and the hernia seems to be relatively large for her size surgical to be a challenge however it is very symptomatic and its tender and it is nonreducible currently. I had a long discussion with the patient and her daughter and I gave them the risks of the surgery especially in her condition I told him that there is a higher chance than usual of recurrence and mesh infection. However the patient is very symptomatic and she would like the hernia to be fixed which I do understand and I agree. I told him that we will attempt to do with robotic but there is a chance to convert to open and even there is a chance that we may have to abort the surgery. I Discussed the possibility of strangulation, enlargement in size over time, and the risk of emergency surgery in the face of strangulation. I also discussed the use of prosthetic materials (mesh), including the risk of infection. Also discussed the risk of surgery including recurrence and the possible need for reoperation and removal of mesh if used, possibility of postoperative small bowel injury, obstruction or ileus, and the risks of general anesthetic. I explained to the the patient about the robotic hernia repair procedure. I told him that Sebastian Thakkar will call them to schedule her surgery    Plan:     1. Schedule for robotic, possible open, incarcerated incisional hernia repair with placement of mesh. 2. No heavy lifting for 2 weeks after the surgery (More than 15 pounds)  3. Avoid constipation by taking stool softener.     Please call me if you have any questions (cell phone: [phone])     Signed By: Gloria Krause MD     May 23, 2022

## 2022-05-24 ENCOUNTER — TELEPHONE (OUTPATIENT)
Dept: SURGERY | Age: 72
End: 2022-05-24

## 2022-05-24 ENCOUNTER — OFFICE VISIT (OUTPATIENT)
Age: 72
End: 2022-05-24

## 2022-05-24 VITALS
HEART RATE: 68 BPM | SYSTOLIC BLOOD PRESSURE: 135 MMHG | DIASTOLIC BLOOD PRESSURE: 73 MMHG | HEIGHT: 58 IN | OXYGEN SATURATION: 99 % | RESPIRATION RATE: 15 BRPM | WEIGHT: 164 LBS | BODY MASS INDEX: 34.43 KG/M2 | TEMPERATURE: 97.6 F

## 2022-05-24 DIAGNOSIS — R22.41 MASS OF LEG, RIGHT: Primary | ICD-10-CM

## 2022-05-24 DIAGNOSIS — N63.0 BREAST NODULE: ICD-10-CM

## 2022-05-24 DIAGNOSIS — M17.0 PRIMARY OSTEOARTHRITIS OF BOTH KNEES: ICD-10-CM

## 2022-05-24 DIAGNOSIS — C83.35 DIFFUSE LARGE B-CELL LYMPHOMA OF LYMPH NODES OF LOWER EXTREMITY (HCC): ICD-10-CM

## 2022-05-24 PROCEDURE — 1123F ACP DISCUSS/DSCN MKR DOCD: CPT | Performed by: INTERNAL MEDICINE

## 2022-05-24 PROCEDURE — 99215 OFFICE O/P EST HI 40 MIN: CPT | Performed by: INTERNAL MEDICINE

## 2022-05-24 NOTE — TELEPHONE ENCOUNTER
Spoke to Ms. Rufina Friend regarding visit with  regarding hernia repair. Ms. Rufina Friend had a breast biopsy, scheduled for a follow up visit with Dr. Tiarra Bauer on Friday, May 27, 2022, will discuss surgery plan also on Friday, May 27, 2022 following visit with Dr. Tiarra Bauer depending on biopsy results.

## 2022-05-24 NOTE — PROGRESS NOTES
Hematology/Oncology  Progress Note    Name: Krista Sandoval  Date: 2022  : 1950  Primary Care Provider: Jennifer Gupta NP    Ms. Danette Olivia is a 66y.o. year old female with right proximal nelia-medial mass 12 x 8 cm which was 4 cm in 2022. Now resected by Dr Billie Hinds.   Pathology revealed  Diffuse Large B Cell Lymphoma     Patient has a history of peptic ulcer  She had  EGD and Colonoscopy in early May ano H Pylori and no malignancy. Patient has increase in size and pain of her ventral hernia. Bone marrow did not reveal any morphologic evidence nor flow evidence  of lymphoma however,  FISH revealed BCL6 rearrangement consistent with DLBCL. Fortunately no BCL2 nor MYC      Patient speaks Polish and her daughter speaks fairly good Nati Deng did request an  and we had an extensive and prolonged discussion.     Forty years ago patient had a hysterectomy for benign reasons, in [facility].         Current Therapy: diagnostic work up    Subjective: The past medical, surgical and social history has been reviewed and remains unchanged.    Past Medical History:   Diagnosis Date    Diabetes ([facility] [address]     Hypertension      Past Surgical History:   Procedure Laterality Date    COLONOSCOPY N/A 2022    [facility]    HX  SECTION      HX ENDOSCOPY      EGD 2022    HX HYSTERECTOMY      HX HYSTERECTOMY      IR BX BONE MARROW DIAGNOSTIC  2022    IR INSERT TUNL CVC W PORT OVER 5 YEARS  2022     Social History     Socioeconomic History    Marital status:      Spouse name: Not on file    Number of children: Not on file    Years of education: Not on file    Highest education level: Not on file   Occupational History    Not on file   Tobacco Use    Smoking status: Never Smoker    Smokeless tobacco: Never Used   Vaping Use    Vaping Use: Never used   Substance and Sexual Activity    Alcohol use: Never    Drug use: Never    Sexual activity: Not on file   Other Topics Concern    Not on file   Social History Narrative    Not on file     Social Determinants of Health     Financial Resource Strain:     Difficulty of Paying Living Expenses: Not on file   Food Insecurity:     Worried About Running Out of Food in the Last Year: Not on file    Devin of Food in the Last Year: Not on file   Transportation Needs:     Lack of Transportation (Medical): Not on file    Lack of Transportation (Non-Medical): Not on file   Physical Activity:     Days of Exercise per Week: Not on file    Minutes of Exercise per Session: Not on file   Stress:     Feeling of Stress : Not on file   Social Connections:     Frequency of Communication with Friends and Family: Not on file    Frequency of Social Gatherings with Friends and Family: Not on file    Attends Nondenominational Services: Not on file    Active Member of [address] [place] [address] [employer] or Organizations: Not on file    Attends Club or Organization Meetings: Not on file    Marital Status: Not on file   Intimate Partner Violence:     Fear of Current or Ex-Partner: Not on file    Emotionally Abused: Not on file    Physically Abused: Not on file    Sexually Abused: Not on file   Housing Stability:     Unable to Pay for Housing in the Last Year: Not on file    Number of [place] in the Last Year: Not on file    Unstable Housing in the Last Year: Not on file     Family History   Problem Relation Age of Onset    Stroke Mother     Cancer Sister     Diabetes Sister     Hypertension Sister     Dementia Brother      Current Outpatient Medications   Medication Sig Dispense Refill    losartan (COZAAR) 50 mg tablet Take 1 Tablet by mouth two (2) times a day. 30 Tablet 2    verapamiL (CALAN) 80 mg tablet Take 1 Tablet by mouth two (2) times a day. 30 Tablet 2    metFORMIN (GLUCOPHAGE) 850 mg tablet Take 1 Tablet by mouth two (2) times a day.  30 Tablet 2    OTHER Take 5 mg by mouth nightly. glibenclamida - diabetes from [place]      OTHER Take 2 mg by mouth nightly. tolterodina - bladder - from [place]      indomethacin (INDOCIN) 25 mg capsule Take 25 mg by mouth two (2) times a day. Facility-Administered Medications Ordered in Other Visits   Medication Dose Route Frequency Provider Last Rate Last Admin    0.9% sodium chloride infusion  20 mL/hr IntraVENous CONTINUOUS Raquel Thoams MD 20 mL/hr at 04/25/22 0924 20 mL/hr at 04/25/22 0924    heparin (porcine) 100 unit/mL injection 500 Units  500 Units InterCATHeter PRN Jorge Norman MD   500 Units at 04/25/22 1024       Review of Systems:    General :The patient has no complaints and there is no physical distress evident. Psychological : patient denies having any psychological symptoms such as hallucinations depression or anxiety. Ophthalmic:the patient denies having any visual impairment or eye discomfort. ENT: there are no abnormalities reported. Allergy and Immunology:the patient denies having any seasonal allergies or allergies to medications other than those already outlined above. Hematological and Lymphatic: the patient denies having any bruising, bleeding or lymphadenopathy. Endocrine: the patient denies having any heat or cold intolerance. There is no history of diabetes or thyroid disorders. Breast: the patient denies having any history of breast mass or lumps. Respiratory:the patient denies having any cough, shortness of breath, or dyspnea on exertion. Cardiovascular: there are no complaints of chest pain, palpitations, chest pounding, or dyspnea on exertion. Gastrointestinal: the patient denies having nausea, emesis, diarrhea, constipation, or blood in the stool. Genito-Urinary: the patient denies having urinary urgency, frequency, or dysuria. Musculoskeletal: with the exception of mild arthralgias the patient has no other musculoskeletal complaints.      Neurological: denies having any numbness, tingling, or neurologic deficits. Dermatological: patient denies having any unexplained rash, skin ulcerations, or hives. Objective:     Visit Vitals  /73   Pulse 68   Temp 97.6 °F (36.4 °C)   Resp 15   Ht 4' 10\" (1.473 m)   Wt 74.4 kg (164 lb)   SpO2 99%   BMI 34.28 kg/m²     Pain Score: 3    Physical Exam:     ECO    General: Well appearing, in NAD    Psychologic: mood and affect are appropriate, no anxiety or depression noted    Skin: examination of the skin reveals no bruising, rash or petechiae    HEENT: Normocephalic, atraumatic. Conjunctiva and sclera are clear. Pupils are equal, round and reactive to light. EOMs are intact. ENT without oral mucosal lesions, stomatitis or thrush    Neck: supple without lymphadenopathy, JVD or thyromegaly    Lymphatics: no palpable cervical, supraclavicular, infraclavicular, axillary or inguinal lymphadenopathy    Lungs: clear breath sounds bilaterally, no rhonchi or wheezes noted    Heart: Regular rate and rhythm, no murmurs, rubs or gallops, S1-S2 noted. Positive peripheral pulses bilaterally upper and lower extremities    Abdomen: soft, non-tender, non-distended, no HSM, positive bowel sounds    Extremities: without clubbing, cyanosis or edema    Neurologic: no focal deficits, steady gait, Alert and oriented x 3. Laboratory Data:     Results for orders placed or performed during the hospital encounter of 22   CBC WITH 3 PART DIFF     Status: Abnormal   Result Value Ref Range Status    WBC 5.4 4.5 - 13.0 K/uL Final    RBC 4.23 4. 10 - 5.10 M/uL Final    HGB 11.2 (L) 12.0 - 16.0 g/dL Final    HCT 36.3 36 - 48 % Final    MCV 85.8 78 - 102 FL Final    MCH 26.5 25.0 - 35.0 PG Final    MCHC 30.9 (L) 31 - 37 g/dL Final    RDW 13.0 11.5 - 14.5 % Final    PLATELET 559 750 - 854 K/uL Final    NEUTROPHILS 71 (H) 40 - 70 % Final    Mixed cells 14 0.1 - 17 % Final    LYMPHOCYTES 15 14 - 44 % Final    ABS.  NEUTROPHILS 3.8 1.8 - 9.5 K/UL Final    ABS. MIXED CELLS 0.8 0.0 - 2.3 K/uL Final    ABS. LYMPHOCYTES 0.8 (L) 1.1 - 5.9 K/UL Final     Comment: Test performed at [address] [place] [address] or Outpatient Infusion Center Location. Reviewed by Medical Director. DF AUTOMATED   Final       Patient Active Problem List   Diagnosis Code    Lump of skin of right lower extremity R22.41    Primary osteoarthritis of both knees M17.0    Diabetes mellitus without complication ([facility] [address] W00.1    Helicobacter pylori antibody positive R76.8         Assessment:     1. Mass of leg, right    2. Diffuse large B-cell lymphoma of lymph nodes of lower extremity (HCC)    3. Breast nodule    4. Primary osteoarthritis of both knees        Plan:     1. Biopsy was performed and the pathology is pending. 2. Patient has been increasing her size and pain in the hernia and will recommend that she have the hernia surgery prior to spindle-shaped chemotherapy. 3. We will have another visit after the biopsy results are available so we can discuss that and reaching out to Dr. Manuel Lima to scheduled surgery soon as possible. 4. Discussion with the patient and her daughter was with assistance of a . They had opportunity To ask questions which were answered. Follow-up and Dispositions  ·   Return in about 3 days (around 5/27/2022) for Follow_up In Person. No orders of the defined types were placed in this encounter.       Willie Salgado MD  5/24/2022

## 2022-05-27 ENCOUNTER — OFFICE VISIT (OUTPATIENT)
Age: 72
End: 2022-05-27

## 2022-05-27 VITALS
DIASTOLIC BLOOD PRESSURE: 78 MMHG | TEMPERATURE: 97.6 F | HEART RATE: 70 BPM | SYSTOLIC BLOOD PRESSURE: 133 MMHG | RESPIRATION RATE: 14 BRPM | OXYGEN SATURATION: 96 % | HEIGHT: 58 IN | BODY MASS INDEX: 34.85 KG/M2 | WEIGHT: 166 LBS

## 2022-05-27 DIAGNOSIS — E13.9 DIABETES 1.5, MANAGED AS TYPE 2 (HCC): ICD-10-CM

## 2022-05-27 DIAGNOSIS — D24.2 FIBROADENOMA OF BREAST, LEFT: ICD-10-CM

## 2022-05-27 DIAGNOSIS — K43.9 HERNIA OF ABDOMINAL WALL: ICD-10-CM

## 2022-05-27 DIAGNOSIS — C83.35 DIFFUSE LARGE B-CELL LYMPHOMA OF LYMPH NODES OF LOWER EXTREMITY (HCC): Primary | ICD-10-CM

## 2022-05-27 PROCEDURE — 99213 OFFICE O/P EST LOW 20 MIN: CPT | Performed by: INTERNAL MEDICINE

## 2022-05-27 PROCEDURE — 1123F ACP DISCUSS/DSCN MKR DOCD: CPT | Performed by: INTERNAL MEDICINE

## 2022-05-27 PROCEDURE — 3044F HG A1C LEVEL LT 7.0%: CPT | Performed by: INTERNAL MEDICINE

## 2022-05-27 RX ORDER — THERA TABS 400 MCG
1 TAB ORAL DAILY
COMMUNITY

## 2022-05-27 NOTE — PERIOP NOTES
PRE-SURGICAL INSTRUCTIONS        Patient's Name:  Ben DOSS'S Date:  5/27/2022            Covid Testing Date and Time:    Surgery Date:  6/3/2022                1. Do NOT eat or drink anything, including candy, gum, or ice chips after midnight on 06/02, unless you have specific instructions from your surgeon or anesthesia provider to do so.  2. You may brush your teeth before coming to the hospital.  3. No smoking 24 hours prior to the day of surgery. 4. No alcohol 24 hours prior to the day of surgery. 5. No recreational drugs for one week prior to the day of surgery. 6. Leave all valuables, including money/purse, at home. 7. Remove all jewelry, nail polish, acrylic nails, and makeup (including mascara); no lotions powders, deodorant, or perfume/cologne/after shave on the skin. 8. Follow instruction for Hibiclens washes and CHG wipes from surgeon's office. 9. Glasses/contact lenses and dentures may be worn to the hospital.  They will be removed prior to surgery. 10. Call your doctor if symptoms of a cold or illness develop within 24-48 hours prior to your surgery. 11.  If you are having an outpatient procedure, please make arrangements for a responsible ADULT TO [address] [place] [address] and stay with you for 24 hours after your surgery. 12. ONE VISITOR in the hospital at this time for outpatient procedures. Exceptions may be made for surgical admissions, per nursing unit guidelines      Special Instructions:      Bring list of CURRENT medications. Bring any pertinent legal medical records and COVID card. Take these medications the morning of surgery with a sip of water:  BP medications  Follow physician instructions about stopping anticoagulants. On the day of surgery, come in the main entrance of [facility]. Let the  at the desk know you are there for surgery. A staff member will come escort you to the surgical area on the second floor.     If you have any questions or concerns, please do not hesitate to call:     (Prior to the day of surgery) PAT department:  [phone]   (Day of surgery) Pre-Op department:  [phone]    These surgical instructions were reviewed with [facility] during the PAT phone call.

## 2022-05-27 NOTE — PATIENT INSTRUCTIONS
If you have any questions or concerns about today's appointment, the verbal and/or written instructions you were given for follow up care, please call our office at [phone]. [facility] - [address]    [phone] office  [phone] fax      PATIENT PRE AND POST OPERATIVE INSTRUCTIONS         [facility]   [address] [place], [address]  [phone]    Before Surgery Instructions:   1) You must have someone available to drive you to and from your procedure and stay with you for the first 24 hours. 2) It is very important that you have nothing to eat or drink after midnight the night before your surgery. This includes chewing gum or sucking on hard candy. Take only heart, blood pressure and cholesterol medications the morning of surgery with only a sip of water. 3) Please stop taking Plavix 5-7 days prior to your surgery with prescribing physician approval.  Stop taking Coumadin 5 days prior to your surgery with prescribing physician approval.  Stop taking all Aspirin or Aspirin containing products 7 days prior to your surgery. Stop taking Advil, Motrin, Aleve, and etc. 3 days prior to your surgery. 4) If you take any diabetic medications please consult with your primary care physician on how to take them on the day of your surgery  5) Please stop all Herbal products 2 weeks prior to your surgery. 6) Please arrive at the hospital 2 hours prior to your surgery, unless you have been otherwise instructed. 7) Patients having an operation on their colon will be given a separate instruction sheet on their Bowel Prep. 8) For any pre-operative work up check in at the main entrance to [facility], and then go to Patient Registration. These studies are done on a walk in basis they are open from 7:00am to 5:00pm Monday through Friday.   9) A urine drug screen will be performed on the day of surgery. Please be advised if your drug screen test result is positive for any illegal substances your surgery is subject to cancellation. 10) Please wash your surgical site the morning of your surgery with soap and water. 11) If you are of child bearing age you will have pregnancy test done the morning of your surgery as soon as you arrive. 12) You're surgery time is subject to change. At times this is necessary due to equipment or staffing needs. 13) If you haven't been vaccinated for COVID, please have COVID test done at [facility] located at [address]) [phone]. No appointment in necessary you may walk in for testing 1:00pm to 3:00pm ONLY. Please be advised it's your responsibility to notify our office of any changes to your healthcare coverage. Failure to notify our office of any changes to your health care coverage may result in denial of payment by your health insurance for all incurred services and you would be responsible for payment for all incurred services. After Surgery Instructions: You will need to be seen in the office for a follow-up visit 7-14 days after your surgery. Please call after you have had the procedure to make this appointment. Unless otherwise instructed, you may remove your outer bandage and shower 48 hours after your surgery. If you develop a fever greater than 101, have any significant drainage, bleeding, swelling and/or pus of the wound. Please call our office immediately. Surgery Date and Time:   Friday, Nuris 3, 2022 at  12:00pm    Please enter [facility] main entrance on the first floor and go to Patient Registration. Once registered, a member of our team will escort you to the second floor. Please check in by  10:00am  the day of your surgery. You may contact Leslie Salgado [email or web address]) with any questions at [phone].

## 2022-05-27 NOTE — PROGRESS NOTES
Hematology/Oncology  Progress Note    Name: Thalia Godwin  Date: 2022  : 1950  Primary Care Provider: West Aguilera NP    Ms. Henny Chaudhary is a 66y.o. year old female with Diffuse Large B-cell lymphoma. , left breast fibroadenoma as per biopsy, progrssivley larger and more painful abdominal hernia    Current Therapy: hernia repain    Subjective: The past medical, surgical and social history has been reviewed and remains unchanged. Past Medical History:   Diagnosis Date    Diabetes ([facility] [address]     Hypertension      Past Surgical History:   Procedure Laterality Date    COLONOSCOPY N/A 2022    [facility]COPY    HX  SECTION      HX ENDOSCOPY      EGD 2022    HX HYSTERECTOMY      HX HYSTERECTOMY      IR BX BONE MARROW DIAGNOSTIC  2022    IR INSERT TUNL CVC W PORT OVER 5 YEARS  2022     Social History     Socioeconomic History    Marital status:      Spouse name: Not on file    Number of children: Not on file    Years of education: Not on file    Highest education level: Not on file   Occupational History    Not on file   Tobacco Use    Smoking status: Never Smoker    Smokeless tobacco: Never Used   Vaping Use    Vaping Use: Never used   Substance and Sexual Activity    Alcohol use: Never    Drug use: Never    Sexual activity: Not on file   Other Topics Concern    Not on file   Social History Narrative    Not on file     Social Determinants of Health     Financial Resource Strain:     Difficulty of Paying Living Expenses: Not on file   Food Insecurity:     Worried About [address] in the Last Year: Not on file    [address] in the Last Year: Not on file   Transportation Needs:     Lack of Transportation (Medical): Not on file    Lack of Transportation (Non-Medical):  Not on file   Physical Activity:     Days of Exercise per Week: Not on file    Minutes of Exercise per Session: Not on file   Stress:     Feeling of Stress : Not on file   Social Connections:     Frequency of Communication with Friends and Family: Not on file    Frequency of Social Gatherings with Friends and Family: Not on file    Attends Confucianist Services: Not on file    Active Member of [address] [place] [address] or Organizations: Not on file    Attends Club or Organization Meetings: Not on file    Marital Status: Not on file   Intimate Partner Violence:     Fear of Current or Ex-Partner: Not on file    Emotionally Abused: Not on file    Physically Abused: Not on file    Sexually Abused: Not on file   Housing Stability:     Unable to Pay for Housing in the Last Year: Not on file    Number of [place] in the Last Year: Not on file    Unstable Housing in the Last Year: Not on file     Family History   Problem Relation Age of Onset    Stroke Mother     Cancer Sister     Diabetes Sister     Hypertension Sister     Dementia Brother      Current Outpatient Medications   Medication Sig Dispense Refill    losartan (COZAAR) 50 mg tablet Take 1 Tablet by mouth two (2) times a day. 30 Tablet 2    verapamiL (CALAN) 80 mg tablet Take 1 Tablet by mouth two (2) times a day. 30 Tablet 2    metFORMIN (GLUCOPHAGE) 850 mg tablet Take 1 Tablet by mouth two (2) times a day. 30 Tablet 2    OTHER Take 5 mg by mouth nightly. glibenclamida - diabetes from [place]      OTHER Take 2 mg by mouth nightly. tolterodina - bladder - from [place]      indomethacin (INDOCIN) 25 mg capsule Take 25 mg by mouth two (2) times a day.        Facility-Administered Medications Ordered in Other Visits   Medication Dose Route Frequency Provider Last Rate Last Admin    0.9% sodium chloride infusion  20 mL/hr IntraVENous CONTINUOUS Job Moctezuma MD 20 mL/hr at 04/25/22 0924 20 mL/hr at 04/25/22 0924    heparin (porcine) 100 unit/mL injection 500 Units  500 Units InterCATHeter PRN Candance Ivans, MD   500 Units at 04/25/22 1024       Review of Systems:    General :The patient has no complaints and there is no physical distress evident. Psychological : patient denies having any psychological symptoms such as hallucinations depression or anxiety. Ophthalmic:the patient denies having any visual impairment or eye discomfort. ENT: there are no abnormalities reported. Allergy and Immunology:the patient denies having any seasonal allergies or allergies to medications other than those already outlined above. Hematological and Lymphatic: the patient denies having any bruising, bleeding or lymphadenopathy. Endocrine: the patient denies having any heat or cold intolerance. There is no history of diabetes or thyroid disorders. Breast: the patient denies having any history of breast mass or lumps. Respiratory:the patient denies having any cough, shortness of breath, or dyspnea on exertion. Cardiovascular: there are no complaints of chest pain, palpitations, chest pounding, or dyspnea on exertion. Gastrointestinal: the patient denies having nausea, emesis, diarrhea, constipation, or blood in the stool. Lower abdomen incisional hernia. Genito-Urinary: the patient denies having urinary urgency, frequency, or dysuria. Musculoskeletal: with the exception of mild arthralgias the patient has no other musculoskeletal complaints. Neurological:  denies having any numbness, tingling, or neurologic deficits. Dermatological: patient denies having any unexplained rash, skin ulcerations, or hives.     Objective:     Visit Vitals  /78   Pulse 70   Temp 97.6 °F (36.4 °C)   Resp 14   Ht 4' 10\" (1.473 m)   Wt 75.3 kg (166 lb)   SpO2 96%   BMI 34.69 kg/m²     Pain Score: 4    Physical Exam:     ECO    General: Well appearing, in NAD    Psychologic: mood and affect are appropriate, no anxiety or depression noted    Skin: examination of the skin reveals no bruising, rash or petechiae    Extremities: without clubbing, cyanosis or edema    Neurologic: no focal deficits, steady gait, Alert and oriented x 3. Laboratory Data:     Results for orders placed or performed during the hospital encounter of 05/17/22   CBC WITH 3 PART DIFF     Status: Abnormal   Result Value Ref Range Status    WBC 5.4 4.5 - 13.0 K/uL Final    RBC 4.23 4. 10 - 5.10 M/uL Final    HGB 11.2 (L) 12.0 - 16.0 g/dL Final    HCT 36.3 36 - 48 % Final    MCV 85.8 78 - 102 FL Final    MCH 26.5 25.0 - 35.0 PG Final    MCHC 30.9 (L) 31 - 37 g/dL Final    RDW 13.0 11.5 - 14.5 % Final    PLATELET 575 079 - 872 K/uL Final    NEUTROPHILS 71 (H) 40 - 70 % Final    Mixed cells 14 0.1 - 17 % Final    LYMPHOCYTES 15 14 - 44 % Final    ABS. NEUTROPHILS 3.8 1.8 - 9.5 K/UL Final    ABS. MIXED CELLS 0.8 0.0 - 2.3 K/uL Final    ABS. LYMPHOCYTES 0.8 (L) 1.1 - 5.9 K/UL Final     Comment: Test performed at [address] [place] [address] or Outpatient Infusion Center Location. Reviewed by Medical Director. DF AUTOMATED   Final       Patient Active Problem List   Diagnosis Code    Lump of skin of right lower extremity R22.41    Primary osteoarthritis of both knees M17.0    Diabetes mellitus without complication ([facility] [address] O68.8    Helicobacter pylori antibody positive R76.8         Assessment:     1. Diffuse large B-cell lymphoma of lymph nodes of lower extremity (HCC)    2. Fibroadenoma of breast, left    3. Diabetes 1.5, managed as type 2 ([facility] [address]    4. Hernia of abdominal wall        Plan:     1. Dr Waldemar Campos will be able to schedule patient for hernia repair next Friday and will follow up with the patient 10 days later is a 17 days from now  2. .  Should follow-up with the patient at that point and hopefully will be able to start the chemotherapy soon after. 3. We are planning on mini R-CHOP  4. Patient and her daughter had opportunity to ask multiple questions with the assistance of a .   5. They are in agreement with this plan    Follow-up and Dispositions  ·   Return in about 19 days (around 6/15/2022). No orders of the defined types were placed in this encounter.       Inder Nuñez MD  5/27/2022

## 2022-05-31 ENCOUNTER — TELEPHONE (OUTPATIENT)
Dept: FAMILY MEDICINE CLINIC | Facility: CLINIC | Age: 72
End: 2022-05-31

## 2022-05-31 DIAGNOSIS — Z71.2 ENCOUNTER TO DISCUSS TEST RESULTS: Primary | ICD-10-CM

## 2022-05-31 NOTE — TELEPHONE ENCOUNTER
Patient/patient's daughter contacted and advised of benign result of breast biopsy. States that they are happy to know this, however, saw the oncologist recently and was advised that large B-cell lymphoma was stage 4, metastasized \"throughtout her body. \"   Abdominal hernia has progressively grown larger and become more painful. Surgery scheduled for 6/3, will start chemo soon after. Hopeful but understand the significance of stage 4 cancer. Reassured that the care  [facility] team is here for them. Encouraged to call if we are able to be of any assistance.

## 2022-06-02 ENCOUNTER — ANESTHESIA EVENT (OUTPATIENT)
Dept: SURGERY | Age: 72
End: 2022-06-02

## 2022-06-03 ENCOUNTER — ANESTHESIA (OUTPATIENT)
Dept: SURGERY | Age: 72
End: 2022-06-03

## 2022-06-03 ENCOUNTER — HOSPITAL ENCOUNTER (OUTPATIENT)
Age: 72
Setting detail: OUTPATIENT SURGERY
Discharge: HOME | End: 2022-06-03
Attending: SURGERY | Admitting: SURGERY

## 2022-06-03 VITALS
OXYGEN SATURATION: 99 % | BODY MASS INDEX: 32.46 KG/M2 | RESPIRATION RATE: 18 BRPM | WEIGHT: 161 LBS | DIASTOLIC BLOOD PRESSURE: 70 MMHG | HEART RATE: 70 BPM | SYSTOLIC BLOOD PRESSURE: 154 MMHG | TEMPERATURE: 97.8 F | HEIGHT: 59 IN

## 2022-06-03 DIAGNOSIS — Z87.19 S/P HERNIA REPAIR: Primary | ICD-10-CM

## 2022-06-03 DIAGNOSIS — Z98.890 S/P HERNIA REPAIR: Primary | ICD-10-CM

## 2022-06-03 LAB
GLUCOSE BLD STRIP.AUTO-MCNC: 125 MG/DL (ref 70–110)
GLUCOSE BLD STRIP.AUTO-MCNC: 146 MG/DL (ref 70–110)
HBA1C MFR BLD: 6.7 % (ref 4.2–5.6)

## 2022-06-03 PROCEDURE — 99100 ANES PT EXTEME AGE<1 YR&>70: CPT | Performed by: ANESTHESIOLOGY

## 2022-06-03 PROCEDURE — 76210000025 HC REC RM PH II 3 TO 3.5 HR: Performed by: SURGERY

## 2022-06-03 PROCEDURE — 74011000258 HC RX REV CODE- 258: Performed by: SURGERY

## 2022-06-03 PROCEDURE — 77030020703 HC SEAL CANN DISP INTU -B: Performed by: SURGERY

## 2022-06-03 PROCEDURE — 74011250637 HC RX REV CODE- 250/637: Performed by: NURSE ANESTHETIST, CERTIFIED REGISTERED

## 2022-06-03 PROCEDURE — 76010000933 HC OR TIME 0.5 TO 1HR INTENSV - TIER 2: Performed by: SURGERY

## 2022-06-03 PROCEDURE — S2900 ROBOTIC SURGICAL SYSTEM: HCPCS | Performed by: SURGERY

## 2022-06-03 PROCEDURE — 49655 PR LAP, INCISIONAL HERNIA REPAIR,INCARCERATED: CPT | Performed by: SURGERY

## 2022-06-03 PROCEDURE — 77030010507 HC ADH SKN DERMBND J&J -B: Performed by: SURGERY

## 2022-06-03 PROCEDURE — 77030040922 HC BLNKT HYPOTHRM STRY -A: Performed by: SURGERY

## 2022-06-03 PROCEDURE — 74011250636 HC RX REV CODE- 250/636: Performed by: NURSE ANESTHETIST, CERTIFIED REGISTERED

## 2022-06-03 PROCEDURE — 76210000016 HC OR PH I REC 1 TO 1.5 HR: Performed by: SURGERY

## 2022-06-03 PROCEDURE — 74011000250 HC RX REV CODE- 250: Performed by: NURSE ANESTHETIST, CERTIFIED REGISTERED

## 2022-06-03 PROCEDURE — 77030003578 HC NDL INSUF VERES AMR -B: Performed by: SURGERY

## 2022-06-03 PROCEDURE — 76060000032 HC ANESTHESIA 0.5 TO 1 HR: Performed by: SURGERY

## 2022-06-03 PROCEDURE — 99100 ANES PT EXTEME AGE<1 YR&>70: CPT | Performed by: NURSE ANESTHETIST, CERTIFIED REGISTERED

## 2022-06-03 PROCEDURE — 83036 HEMOGLOBIN GLYCOSYLATED A1C: CPT

## 2022-06-03 PROCEDURE — 74011250636 HC RX REV CODE- 250/636: Performed by: SURGERY

## 2022-06-03 PROCEDURE — 74011000250 HC RX REV CODE- 250: Performed by: SURGERY

## 2022-06-03 PROCEDURE — 82962 GLUCOSE BLOOD TEST: CPT

## 2022-06-03 PROCEDURE — 00790 ANES IPER UPR ABD NOS: CPT | Performed by: ANESTHESIOLOGY

## 2022-06-03 PROCEDURE — C1781 MESH (IMPLANTABLE): HCPCS | Performed by: SURGERY

## 2022-06-03 PROCEDURE — 77030040361 HC SLV COMPR DVT MDII -B: Performed by: SURGERY

## 2022-06-03 PROCEDURE — 77030031139 HC SUT VCRL2 J&J -A: Performed by: SURGERY

## 2022-06-03 PROCEDURE — C9290 INJ, BUPIVACAINE LIPOSOME: HCPCS | Performed by: SURGERY

## 2022-06-03 PROCEDURE — 77030035277 HC OBTRTR BLDELSS DISP INTU -B: Performed by: SURGERY

## 2022-06-03 PROCEDURE — 77030002933 HC SUT MCRYL J&J -A: Performed by: SURGERY

## 2022-06-03 PROCEDURE — 00790 ANES IPER UPR ABD NOS: CPT | Performed by: NURSE ANESTHETIST, CERTIFIED REGISTERED

## 2022-06-03 PROCEDURE — 77030022704 HC SUT VLOC COVD -B: Performed by: SURGERY

## 2022-06-03 PROCEDURE — 2709999900 HC NON-CHARGEABLE SUPPLY: Performed by: SURGERY

## 2022-06-03 DEVICE — MESH SURG W4XL6IN ELLIPSE SEPRA TECHNOLOGY VENTRALIGHT: Type: IMPLANTABLE DEVICE | Site: ABDOMEN | Status: FUNCTIONAL

## 2022-06-03 RX ORDER — GLYCOPYRROLATE 0.2 MG/ML
INJECTION INTRAMUSCULAR; INTRAVENOUS AS NEEDED
Status: DISCONTINUED | OUTPATIENT
Start: 2022-06-03 | End: 2022-06-03 | Stop reason: HOSPADM

## 2022-06-03 RX ORDER — ONDANSETRON 2 MG/ML
4 INJECTION INTRAMUSCULAR; INTRAVENOUS
Status: COMPLETED | OUTPATIENT
Start: 2022-06-03 | End: 2022-06-03

## 2022-06-03 RX ORDER — HYDROMORPHONE HYDROCHLORIDE 1 MG/ML
0.2 INJECTION, SOLUTION INTRAMUSCULAR; INTRAVENOUS; SUBCUTANEOUS
Status: DISCONTINUED | OUTPATIENT
Start: 2022-06-03 | End: 2022-06-03 | Stop reason: HOSPADM

## 2022-06-03 RX ORDER — LIDOCAINE HYDROCHLORIDE 10 MG/ML
0.1 INJECTION, SOLUTION EPIDURAL; INFILTRATION; INTRACAUDAL; PERINEURAL AS NEEDED
Status: DISCONTINUED | OUTPATIENT
Start: 2022-06-03 | End: 2022-06-03 | Stop reason: HOSPADM

## 2022-06-03 RX ORDER — SUCCINYLCHOLINE CHLORIDE 20 MG/ML
INJECTION INTRAMUSCULAR; INTRAVENOUS AS NEEDED
Status: DISCONTINUED | OUTPATIENT
Start: 2022-06-03 | End: 2022-06-03 | Stop reason: HOSPADM

## 2022-06-03 RX ORDER — DIPHENHYDRAMINE HYDROCHLORIDE 50 MG/ML
12.5 INJECTION, SOLUTION INTRAMUSCULAR; INTRAVENOUS
Status: DISCONTINUED | OUTPATIENT
Start: 2022-06-03 | End: 2022-06-03 | Stop reason: HOSPADM

## 2022-06-03 RX ORDER — OXYCODONE AND ACETAMINOPHEN 5; 325 MG/1; MG/1
1 TABLET ORAL AS NEEDED
Status: DISCONTINUED | OUTPATIENT
Start: 2022-06-03 | End: 2022-06-03 | Stop reason: HOSPADM

## 2022-06-03 RX ORDER — FENTANYL CITRATE 50 UG/ML
INJECTION, SOLUTION INTRAMUSCULAR; INTRAVENOUS AS NEEDED
Status: DISCONTINUED | OUTPATIENT
Start: 2022-06-03 | End: 2022-06-03 | Stop reason: HOSPADM

## 2022-06-03 RX ORDER — ROCURONIUM BROMIDE 10 MG/ML
INJECTION, SOLUTION INTRAVENOUS AS NEEDED
Status: DISCONTINUED | OUTPATIENT
Start: 2022-06-03 | End: 2022-06-03 | Stop reason: HOSPADM

## 2022-06-03 RX ORDER — NEOSTIGMINE METHYLSULFATE 1 MG/ML
INJECTION, SOLUTION INTRAVENOUS AS NEEDED
Status: DISCONTINUED | OUTPATIENT
Start: 2022-06-03 | End: 2022-06-03 | Stop reason: HOSPADM

## 2022-06-03 RX ORDER — INSULIN LISPRO 100 [IU]/ML
INJECTION, SOLUTION INTRAVENOUS; SUBCUTANEOUS ONCE
Status: DISCONTINUED | OUTPATIENT
Start: 2022-06-03 | End: 2022-06-03 | Stop reason: HOSPADM

## 2022-06-03 RX ORDER — FENTANYL CITRATE 50 UG/ML
25 INJECTION, SOLUTION INTRAMUSCULAR; INTRAVENOUS AS NEEDED
Status: DISCONTINUED | OUTPATIENT
Start: 2022-06-03 | End: 2022-06-03 | Stop reason: HOSPADM

## 2022-06-03 RX ORDER — PROPOFOL 10 MG/ML
INJECTION, EMULSION INTRAVENOUS AS NEEDED
Status: DISCONTINUED | OUTPATIENT
Start: 2022-06-03 | End: 2022-06-03 | Stop reason: HOSPADM

## 2022-06-03 RX ORDER — ONDANSETRON 2 MG/ML
INJECTION INTRAMUSCULAR; INTRAVENOUS AS NEEDED
Status: DISCONTINUED | OUTPATIENT
Start: 2022-06-03 | End: 2022-06-03 | Stop reason: HOSPADM

## 2022-06-03 RX ORDER — SODIUM CHLORIDE, SODIUM LACTATE, POTASSIUM CHLORIDE, CALCIUM CHLORIDE 600; 310; 30; 20 MG/100ML; MG/100ML; MG/100ML; MG/100ML
INJECTION, SOLUTION INTRAVENOUS
Status: DISCONTINUED | OUTPATIENT
Start: 2022-06-03 | End: 2022-06-03 | Stop reason: HOSPADM

## 2022-06-03 RX ORDER — SODIUM CHLORIDE, SODIUM LACTATE, POTASSIUM CHLORIDE, CALCIUM CHLORIDE 600; 310; 30; 20 MG/100ML; MG/100ML; MG/100ML; MG/100ML
25 INJECTION, SOLUTION INTRAVENOUS CONTINUOUS
Status: DISCONTINUED | OUTPATIENT
Start: 2022-06-03 | End: 2022-06-03 | Stop reason: HOSPADM

## 2022-06-03 RX ORDER — DEXTROSE MONOHYDRATE 100 MG/ML
0-250 INJECTION, SOLUTION INTRAVENOUS AS NEEDED
Status: DISCONTINUED | OUTPATIENT
Start: 2022-06-03 | End: 2022-06-03 | Stop reason: HOSPADM

## 2022-06-03 RX ORDER — SODIUM CHLORIDE, SODIUM LACTATE, POTASSIUM CHLORIDE, CALCIUM CHLORIDE 600; 310; 30; 20 MG/100ML; MG/100ML; MG/100ML; MG/100ML
100 INJECTION, SOLUTION INTRAVENOUS CONTINUOUS
Status: DISCONTINUED | OUTPATIENT
Start: 2022-06-03 | End: 2022-06-03 | Stop reason: HOSPADM

## 2022-06-03 RX ORDER — MAGNESIUM SULFATE 100 %
4 CRYSTALS MISCELLANEOUS AS NEEDED
Status: DISCONTINUED | OUTPATIENT
Start: 2022-06-03 | End: 2022-06-03 | Stop reason: HOSPADM

## 2022-06-03 RX ORDER — LABETALOL HCL 20 MG/4 ML
SYRINGE (ML) INTRAVENOUS AS NEEDED
Status: DISCONTINUED | OUTPATIENT
Start: 2022-06-03 | End: 2022-06-03 | Stop reason: HOSPADM

## 2022-06-03 RX ORDER — OXYCODONE AND ACETAMINOPHEN 5; 325 MG/1; MG/1
1 TABLET ORAL
Qty: 24 TABLET | Refills: 0 | Status: SHIPPED | OUTPATIENT
Start: 2022-06-03 | End: 2022-06-06 | Stop reason: SDUPTHER

## 2022-06-03 RX ORDER — SODIUM CHLORIDE 0.9 % (FLUSH) 0.9 %
5-40 SYRINGE (ML) INJECTION EVERY 8 HOURS
Status: DISCONTINUED | OUTPATIENT
Start: 2022-06-03 | End: 2022-06-03 | Stop reason: HOSPADM

## 2022-06-03 RX ORDER — SODIUM CHLORIDE 0.9 % (FLUSH) 0.9 %
5-40 SYRINGE (ML) INJECTION AS NEEDED
Status: DISCONTINUED | OUTPATIENT
Start: 2022-06-03 | End: 2022-06-03 | Stop reason: HOSPADM

## 2022-06-03 RX ORDER — DEXAMETHASONE SODIUM PHOSPHATE 4 MG/ML
INJECTION, SOLUTION INTRA-ARTICULAR; INTRALESIONAL; INTRAMUSCULAR; INTRAVENOUS; SOFT TISSUE AS NEEDED
Status: DISCONTINUED | OUTPATIENT
Start: 2022-06-03 | End: 2022-06-03 | Stop reason: HOSPADM

## 2022-06-03 RX ADMIN — SODIUM CHLORIDE, SODIUM LACTATE, POTASSIUM CHLORIDE, AND CALCIUM CHLORIDE: 600; 310; 30; 20 INJECTION, SOLUTION INTRAVENOUS at 13:18

## 2022-06-03 RX ADMIN — FENTANYL CITRATE 100 MCG: 50 INJECTION, SOLUTION INTRAMUSCULAR; INTRAVENOUS at 13:23

## 2022-06-03 RX ADMIN — DEXAMETHASONE SODIUM PHOSPHATE 4 MG: 4 INJECTION, SOLUTION INTRAMUSCULAR; INTRAVENOUS at 13:35

## 2022-06-03 RX ADMIN — ONDANSETRON 4 MG: 2 INJECTION INTRAMUSCULAR; INTRAVENOUS at 13:35

## 2022-06-03 RX ADMIN — ONDANSETRON 4 MG: 2 INJECTION INTRAMUSCULAR; INTRAVENOUS at 16:46

## 2022-06-03 RX ADMIN — ROCURONIUM BROMIDE 15 MG: 50 INJECTION INTRAVENOUS at 13:34

## 2022-06-03 RX ADMIN — OXYCODONE HYDROCHLORIDE AND ACETAMINOPHEN 1 TABLET: 5; 325 TABLET ORAL at 16:28

## 2022-06-03 RX ADMIN — HYDROMORPHONE HYDROCHLORIDE 0.2 MG: 1 INJECTION, SOLUTION INTRAMUSCULAR; INTRAVENOUS; SUBCUTANEOUS at 16:44

## 2022-06-03 RX ADMIN — HYDROMORPHONE HYDROCHLORIDE 0.2 MG: 1 INJECTION, SOLUTION INTRAMUSCULAR; INTRAVENOUS; SUBCUTANEOUS at 17:04

## 2022-06-03 RX ADMIN — LABETALOL 20 MG/4 ML (5 MG/ML) INTRAVENOUS SYRINGE 5 MG: at 13:45

## 2022-06-03 RX ADMIN — PROPOFOL 100 MG: 10 INJECTION, EMULSION INTRAVENOUS at 13:29

## 2022-06-03 RX ADMIN — ROCURONIUM BROMIDE 5 MG: 50 INJECTION INTRAVENOUS at 13:29

## 2022-06-03 RX ADMIN — FENTANYL CITRATE 25 MCG: 50 INJECTION, SOLUTION INTRAMUSCULAR; INTRAVENOUS at 15:26

## 2022-06-03 RX ADMIN — SUCCINYLCHOLINE CHLORIDE 100 MG: 20 INJECTION, SOLUTION INTRAMUSCULAR; INTRAVENOUS at 13:29

## 2022-06-03 RX ADMIN — SODIUM CHLORIDE, POTASSIUM CHLORIDE, SODIUM LACTATE AND CALCIUM CHLORIDE 25 ML/HR: 600; 310; 30; 20 INJECTION, SOLUTION INTRAVENOUS at 10:30

## 2022-06-03 RX ADMIN — FENTANYL CITRATE 25 MCG: 50 INJECTION, SOLUTION INTRAMUSCULAR; INTRAVENOUS at 15:21

## 2022-06-03 RX ADMIN — Medication 3 MG: at 14:05

## 2022-06-03 RX ADMIN — GLYCOPYRROLATE 0.4 MG: 0.2 INJECTION INTRAMUSCULAR; INTRAVENOUS at 14:05

## 2022-06-03 RX ADMIN — FAMOTIDINE 20 MG: 10 INJECTION INTRAVENOUS at 11:00

## 2022-06-03 RX ADMIN — HYDROMORPHONE HYDROCHLORIDE 0.2 MG: 1 INJECTION, SOLUTION INTRAMUSCULAR; INTRAVENOUS; SUBCUTANEOUS at 18:37

## 2022-06-03 RX ADMIN — PROPOFOL 100 MG: 10 INJECTION, EMULSION INTRAVENOUS at 13:43

## 2022-06-03 RX ADMIN — CLINDAMYCIN 900 MG: 150 INJECTION, SOLUTION INTRAMUSCULAR; INTRAVENOUS at 13:36

## 2022-06-03 NOTE — DISCHARGE INSTRUCTIONS
Discharge Instructions Following Surgery    Patient: Mark Santamaria MRN: [id number]  SSN: [id number]    YOB: 1950  Age: 66 y.o. Sex: female      Activity  · As tolerated, walking encourage, stairs are okay. · Avoid strenuous activities - no lifting anything heavier than 15 pounds till seen in the clinic. · You may shower at home after 24 hours. Diet  · Regular diet after nausea from the anesthetic has passed. Pain  · Take pain medication as directed by your doctor. · Call your doctor if pain is NOT relieved by medication. Wound and Dressing Care  · There is glue on the wounds. No need for any dressing care. · Apply ice packs to the area of the surgery for the first 1 to 2 days  · Apply warm compresses after 2 days for pain relieve if needed    After Anesthesia  · For the first 24 hours: DO NOT Drive, Drink alcoholic beverages, or Make important decisions. · Be aware of dizziness following anesthesia and while taking pain medication. Call your doctor if  · Excessive bleeding that does not stop after holding mild pressure over the area. · Temperature of 101 degrees F or above. · Redness,excessive swelling or bruising, and/or green or yellow, smelly discharge from incision. · If nausea and vomiting continues. Appointment date/time Follow-Up Phone Calls    · Call the office at [phone] to make your follow-up appointment in 2 weeks after the surgery (if not already set up) . Dr. Yodit Velazquez cell phone number is [phone]. Please call me if you have any concerns or questions. Patient Education        Abdominal Hernia Repair: What to Expect at Home  Your Recovery  After surgery to repair your hernia, you are likely to have pain for a few days. You may also feel tired and have less energy than normal. This is common. You should feel better after a few days and will probably feel much better in 7 days.   For several weeks you may feel discomfort or pulling in the hernia repair when you move. You may have some bruising around the area of the repair. This is normal.  This care sheet gives you a general idea about how long it will take for you to recover. But each person recovers at a different pace. Follow the steps below to get better as quickly as possible. How can you care for yourself at home? Activity    · Rest when you feel tired. Getting enough sleep will help you recover.     · Try to walk each day. Start by walking a little more than you did the day before. Bit by bit, increase the amount you walk. Walking boosts blood flow and helps prevent pneumonia and constipation.     · If your doctor gives you an abdominal binder to wear, use it as directed. This is an elastic bandage that wraps around your belly and upper hips. It helps support your belly muscles after surgery.     · Avoid strenuous activities, such as biking, jogging, weight lifting, or aerobic exercise, until your doctor says it is okay.     · Avoid lifting anything that would make you strain. This may include heavy grocery bags and milk containers, a heavy briefcase or backpack, cat litter or dog food bags, a vacuum , or a child.     · Ask your doctor when you can drive again.     · Most people are able to return to work within 1 to 2 weeks after surgery. But if your job requires that you do heavy lifting or strenuous activity, you may need to take 4 to 6 weeks off from work.     · You may shower 24 to 48 hours after surgery, if your doctor okays it. Pat the cut (incision) dry. Do not take a bath for the first 2 weeks, or until your doctor tells you it is okay.     · Ask your doctor when it is okay for you to have sex. Diet    · You can eat your normal diet.  If your stomach is upset, try bland, low-fat foods like plain rice, broiled chicken, toast, and yogurt.     · Drink plenty of fluids (unless your doctor tells you not to).     · You may notice that your bowel movements are not regular right after your surgery. This is common. Avoid constipation and straining with bowel movements. You may want to take a fiber supplement every day. If you have not had a bowel movement after a couple of days, ask your doctor about taking a mild laxative. Medicines    · Your doctor will tell you if and when you can restart your medicines. You will also be given instructions about taking any new medicines.     · If you take aspirin or some other blood thinner, ask your doctor if and when to start taking it again. Make sure that you understand exactly what your doctor wants you to do.     · Be safe with medicines. Take pain medicines exactly as directed. ? If the doctor gave you a prescription medicine for pain, take it as prescribed. ? If you are not taking a prescription pain medicine, ask your doctor if you can take an over-the-counter medicine.     · If your doctor prescribed antibiotics, take them as directed. Do not stop taking them just because you feel better. You need to take the full course of antibiotics.     · If you think your pain medicine is making you sick to your stomach:  ? Take your medicine after meals (unless your doctor has told you not to). ? Ask your doctor for a different pain medicine. Incision care    · If you have strips of tape on the cut (incision) the doctor made, leave the tape on for a week or until it falls off. Or follow your doctor's instructions for removing the tape.     · If you have staples closing the cut, you will need to visit your doctor in 1 to 2 weeks to have them removed.     · Wash the area daily with warm, soapy water, and pat it dry. Don't use hydrogen peroxide or alcohol, which can slow healing. You may cover the area with a gauze bandage if it weeps or rubs against clothing. Change the bandage every day. Other instructions    · Hold a pillow over your incision when you cough or take deep breaths.  This will support your belly and decrease your pain.     · Do breathing exercises at home as instructed by your doctor. This will help prevent pneumonia.     · If you had laparoscopic surgery, you may also have pain in your shoulder. The pain usually lasts about a day or two. Follow-up care is a key part of your treatment and safety. Be sure to make and go to all appointments, and call your doctor if you are having problems. It's also a good idea to know your test results and keep a list of the medicines you take. When should you call for help? Call 911 anytime you think you may need emergency care. For example, call if:    · You passed out (lost consciousness).     · You are short of breath. Call your doctor now or seek immediate medical care if:    · You are sick to your stomach and cannot drink fluids.     · You have signs of a blood clot in your leg (called a deep vein thrombosis), such as:  ? Pain in your calf, back of the knee, thigh, or groin. ? Redness and swelling in your leg or groin.     · You have signs of infection, such as:  ? Increased pain, swelling, warmth, or redness. ? Red streaks leading from the incision. ? Pus draining from the incision. ? A fever.     · You cannot pass stools or gas.     · You have pain that does not get better after you take pain medicine.     · You have loose stitches, or your incision comes open.     · Bright red blood has soaked through the bandage over your incision. Watch closely for changes in your health, and be sure to contact your doctor if you have any problems. Where can you learn more? Go to http://www.gray.com/  Enter B577 in the search box to learn more about \"Abdominal Hernia Repair: What to Expect at Home. \"  Current as of: September 8, 2021               Content Version: 13.2  © 4688-1885 [employer]. Care instructions adapted under license by [employer] (which disclaims liability or warranty for this information).  If you have questions about a medical condition or this instruction, always ask your healthcare professional. [place] [address] any warranty or liability for your use of this information. DISCHARGE SUMMARY from Nurse    PATIENT INSTRUCTIONS:    After general anesthesia or intravenous sedation, for 24 hours or while taking prescription Narcotics:  · Limit your activities  · Do not drive and operate hazardous machinery  · Do not make important personal or business decisions  · Do  not drink alcoholic beverages  · If you have not urinated within 8 hours after discharge, please contact your surgeon on call. Report the following to your surgeon:  · Excessive pain, swelling, redness or odor of or around the surgical area  · Temperature over 100.5  · Nausea and vomiting lasting longer than 4 hours or if unable to take medications  · Any signs of decreased circulation or nerve impairment to extremity: change in color, persistent  numbness, tingling, coldness or increase pain  · Any questions    What to do at Home:      *  Please give a list of your current medications to your Primary Care Provider. *  Please update this list whenever your medications are discontinued, doses are      changed, or new medications (including over-the-counter products) are added. *  Please carry medication information at all times in case of emergency situations. These are general instructions for a healthy lifestyle:    No smoking/ No tobacco products/ Avoid exposure to second hand smoke  Surgeon General's Warning:  Quitting smoking now greatly reduces serious risk to your health.     Obesity, smoking, and sedentary lifestyle greatly increases your risk for illness    A healthy diet, regular physical exercise & weight monitoring are important for maintaining a healthy lifestyle    You may be retaining fluid if you have a history of heart failure or if you experience any of the following symptoms:  Weight gain of 3 pounds or more overnight or 5 pounds in a week, increased swelling in our hands or feet or shortness of breath while lying flat in bed. Please call your doctor as soon as you notice any of these symptoms; do not wait until your next office visit. The discharge information has been reviewed with the patient. The patient verbalized understanding. Discharge medications reviewed with the patient and appropriate educational materials and side effects teaching were provided.   ___________________________________________________________________________________________________________________________________

## 2022-06-03 NOTE — ANESTHESIA POSTPROCEDURE EVALUATION
Procedure(s):  ROBOTIC ASSISTED INCARCERATED INCISIONAL HERNIA REPAIR WITH PLACEMENT OF MESH  *LANGUAGE BARRIER*. general    Anesthesia Post Evaluation      Multimodal analgesia: multimodal analgesia used between 6 hours prior to anesthesia start to PACU discharge  Patient location during evaluation: bedside  Patient participation: complete - patient participated  Level of consciousness: awake  Pain score: 7  Pain management: adequate  Airway patency: patent  Anesthetic complications: no  Cardiovascular status: stable  Respiratory status: acceptable  Hydration status: acceptable  Post anesthesia nausea and vomiting:  none  Final Post Anesthesia Temperature Assessment:  Normothermia (36.0-37.5 degrees C)      INITIAL Post-op Vital signs:   Vitals Value Taken Time   /67 06/03/22 1541   Temp 36.4 °C (97.5 °F) 06/03/22 1541   Pulse 71 06/03/22 1547   Resp 12 06/03/22 1547   SpO2 99 % 06/03/22 1547   Vitals shown include unvalidated device data.

## 2022-06-03 NOTE — PROGRESS NOTES
Patient discharge in stable condition. Discharge instructions given. All questions answered to patient's and daughter's satisfaction. Patient and daughter given a copy of discharge instructions. All PIVs removed. Pressure dressing applied. No active bleeding noted. All patient's belongings are within patient's and daughter's possession. Patient escorted off unit via wheelchair. Patient's daughter escorted patient home via POV.

## 2022-06-03 NOTE — PROGRESS NOTES
Date of Surgery Update:  Ashly Santos was seen and examined. History and physical has been reviewed. The patient has been examined. There have been no significant clinical changes since the completion of the originally dated History and Physical. Will proceed with robotic, possible open, incarcerated incisional hernia repair with placement of mesh.     Signed By: Latina Severin, MD     Nuris 3, 2022 11:01 AM

## 2022-06-03 NOTE — ANESTHESIA PREPROCEDURE EVALUATION
Relevant Problems   No relevant active problems       Anesthetic History   No history of anesthetic complications            Review of Systems / Medical History  Patient summary reviewed and pertinent labs reviewed    Pulmonary                   Neuro/Psych              Cardiovascular    Hypertension                   GI/Hepatic/Renal                Endo/Other    Diabetes: type 2    Arthritis     Other Findings              Physical Exam    Airway  Mallampati: II  TM Distance: 4 - 6 cm  Neck ROM: decreased range of motion   Mouth opening: Diminished (comment)     Cardiovascular    Rhythm: regular  Rate: normal         Dental    Dentition: Poor dentition     Pulmonary      Decreased breath sounds           Abdominal  GI exam deferred       Other Findings            Anesthetic Plan    ASA: 2  Anesthesia type: general          Induction: Intravenous  Anesthetic plan and risks discussed with: Patient

## 2022-06-03 NOTE — PERIOP NOTES
Pre-op interview performed with video , Weston Eddy with Fiorabelardo Lima # [id number]. Daughter @ bedside.

## 2022-06-03 NOTE — BRIEF OP NOTE
Brief Postoperative Note    Patient: Yonas Anthony  YOB: 1950  MRN: [id number]    Date of Procedure: 6/3/2022     Pre-Op Diagnosis: Incisional hernia, without obstruction or gangrene [K43.2]    Post-Op Diagnosis: Same as preoperative diagnosis. Procedure(s):  ROBOTIC ASSISTED POSSIBLE OPEN INCARCERATED INCISIONAL HERNIA REPAIR WITH PLACEMENT OF MESH  *LANGUAGE BARRIER*    Surgeon(s):  Tisha Pandey MD    Surgical Assistant: Surg Asst-1: Rosalina Kanner    Anesthesia: General     Estimated Blood Loss (mL): Minimal    Complications: None    Specimens: * No specimens in log *     Implants:   Implant Name Type Inv. Item Serial No.  Lot No. LRB No. Used Action   MESH SURG Margaret Fco [id number]  MESH SURG [id number] [employer] Marge Boles DINORAHOL_WD [id number] N/A 1 Implanted       Drains: * No LDAs found *    Findings: Hernia.      Electronically Signed by Leslie Dunbar MD on 6/3/2022 at 2:14 PM

## 2022-06-04 NOTE — OP NOTES
[facility]  OPERATIVE REPORT    Name:  Berhane Grewal  MR#:   [id number]  :  1950  ACCOUNT #:  [de-identified]  DATE OF SERVICE:  2022    PREOPERATIVE DIAGNOSIS:  Incisional hernia. POSTOPERATIVE DIAGNOSIS:  Incarcerated incisional hernia. PROCEDURES PERFORMED:  1. Extensive lysis of adhesion that took more than half the time of the surgery. 2.  Robotic repair of incarcerated incisional hernia with mesh placement. SURGEON:  Alix Love. Emely Gutierrez MD.    Catalina Medina. ANESTHESIA:  General.    COMPLICATIONS:  None. SPECIMENS REMOVED:  None. IMPLANTS:  Ventralight Bard mesh 4 x 6 inches. ESTIMATED BLOOD LOSS:  Minimal.    DETAILS OF PROCEDURE:  The patient was brought to the operating room. Anesthesia was induced. Scrubbing and draping of the abdomen were done in the usual manner. A time-out was performed. A skin incision in the left upper quadrant was performed. Veress needle was inserted. Saline drop test was performed. Abdomen was insufflated. An 8 mm port was inserted. Abdomen was explored. There was no injury from the Veress needle or the port placement. Under direct visualization, two other 8 mm ports were placed on the left side of the abdominal wall and TAP block was performed bilaterally. The robot was docked. There were adhesions with incarcerated small bowel inside the hernia with omentum. At that point, we started with lysis of adhesions, which we did with scissors and a grasper. This took more than half the time of the surgery. At that point, we reached the hernia itself. There was a loop of small bowel inside the hernia sac. We gently grabbed the mesentery of the bowel gently took it down. We were able to dissect the bowel from the hernia sac completely down and the omentum as well. We did lysis of adhesion as well above and below the hernia for placement of the mesh.   Note, extensive lysis of adhesion took more than half the time of the surgery. At that point, the defect was about 3 cm long by 2 cm in width. It was closed with a #0 V-Loc suture in a running fashion to completely close the fascial defect. At that point, we opened a 4 x 6 inch Ventralight Bard mesh and we placed it inside the abdomen with the rough side toward the abdominal wall and the smooth side toward the bowel. This was affixed with 2-0 V-Loc 12 inch sutures, three of them, to be able to affix the mesh to the anterior abdominal wall. Hemostasis was secured. The needles were taken out. The instruments were removed. The abdomen was desufflated. The skin incisions were closed with 4-0 Monocryl and glue.       Jayme Bonilla MD      [id number]  D:  06/03/2022 14:25  T:  06/04/2022 1:58  JOB #:  [id number]

## 2022-06-06 RX ORDER — OXYCODONE AND ACETAMINOPHEN 5; 325 MG/1; MG/1
1 TABLET ORAL
Qty: 24 TABLET | Refills: 0 | Status: SHIPPED | OUTPATIENT
Start: 2022-06-06 | End: 2022-06-09

## 2022-06-16 ENCOUNTER — OFFICE VISIT (OUTPATIENT)
Age: 72
End: 2022-06-16

## 2022-06-16 ENCOUNTER — OFFICE VISIT (OUTPATIENT)
Dept: SURGERY | Age: 72
End: 2022-06-16

## 2022-06-16 VITALS
BODY MASS INDEX: 33.18 KG/M2 | RESPIRATION RATE: 15 BRPM | WEIGHT: 164.6 LBS | SYSTOLIC BLOOD PRESSURE: 150 MMHG | HEART RATE: 76 BPM | HEIGHT: 59 IN | OXYGEN SATURATION: 97 % | DIASTOLIC BLOOD PRESSURE: 77 MMHG | TEMPERATURE: 97.5 F

## 2022-06-16 VITALS
SYSTOLIC BLOOD PRESSURE: 175 MMHG | OXYGEN SATURATION: 97 % | DIASTOLIC BLOOD PRESSURE: 69 MMHG | TEMPERATURE: 97.6 F | HEIGHT: 59 IN | HEART RATE: 77 BPM | BODY MASS INDEX: 33.06 KG/M2 | WEIGHT: 164 LBS

## 2022-06-16 DIAGNOSIS — Z87.19 S/P HERNIA REPAIR: Primary | ICD-10-CM

## 2022-06-16 DIAGNOSIS — K27.9 PUD (PEPTIC ULCER DISEASE): ICD-10-CM

## 2022-06-16 DIAGNOSIS — D24.2 FIBROADENOMA OF BREAST, LEFT: ICD-10-CM

## 2022-06-16 DIAGNOSIS — E53.8 VITAMIN B 12 DEFICIENCY: ICD-10-CM

## 2022-06-16 DIAGNOSIS — M17.0 PRIMARY OSTEOARTHRITIS OF BOTH KNEES: ICD-10-CM

## 2022-06-16 DIAGNOSIS — E13.9 DIABETES 1.5, MANAGED AS TYPE 2 (HCC): ICD-10-CM

## 2022-06-16 DIAGNOSIS — Z98.890 S/P HERNIA REPAIR: Primary | ICD-10-CM

## 2022-06-16 DIAGNOSIS — D50.8 OTHER IRON DEFICIENCY ANEMIA: ICD-10-CM

## 2022-06-16 DIAGNOSIS — E55.9 VITAMIN D DEFICIENCY: ICD-10-CM

## 2022-06-16 DIAGNOSIS — C83.35 DIFFUSE LARGE B-CELL LYMPHOMA OF LYMPH NODES OF LOWER EXTREMITY (HCC): Primary | ICD-10-CM

## 2022-06-16 PROCEDURE — 99024 POSTOP FOLLOW-UP VISIT: CPT | Performed by: SURGERY

## 2022-06-16 PROCEDURE — 3044F HG A1C LEVEL LT 7.0%: CPT | Performed by: INTERNAL MEDICINE

## 2022-06-16 PROCEDURE — 1123F ACP DISCUSS/DSCN MKR DOCD: CPT | Performed by: INTERNAL MEDICINE

## 2022-06-16 PROCEDURE — 99214 OFFICE O/P EST MOD 30 MIN: CPT | Performed by: INTERNAL MEDICINE

## 2022-06-16 NOTE — PROGRESS NOTES
Hematology/Oncology  Progress Note    Name: Nano Aly  Date: 2022  : 1950  Primary Care Provider: Jenny Alcocer NP    Ms. Jaswant Lomas is a 66y.o. year old female with e with Diffuse Large B-cell lymphoma. , left breast fibroadenoma as per biopsy, progrssivley larger and more painful abdominal hernia     Current Therapy: hernia repair complete and Dr Tres George gives us the clearance to proceed with chemothrapy        Subjective: The past medical, surgical and social history has been reviewed and remains unchanged. Past Medical History:   Diagnosis Date    Diabetes ([facility]     Hypertension     Lymphoma ([facility]     Right leg     Past Surgical History:   Procedure Laterality Date    COLONOSCOPY N/A 2022    COLONOSCOPY performed by Juanita [facility]el [facility] at [facility] ENDOSCOPY    HX  SECTION      HX CHOLECYSTECTOMY      HX ENDOSCOPY      EGD 2022    HX HYSTERECTOMY      IR BX BONE MARROW DIAGNOSTIC  2022    IR INSERT TUNL CVC W PORT OVER 5 YEARS  2022     Social History     Socioeconomic History    Marital status:      Spouse name: Not on file    Number of children: Not on file    Years of education: Not on file    Highest education level: Not on file   Occupational History    Not on file   Tobacco Use    Smoking status: Never Smoker    Smokeless tobacco: Never Used   Vaping Use    Vaping Use: Never used   Substance and Sexual Activity    Alcohol use: Never    Drug use: Never    Sexual activity: Not on file   Other Topics Concern    Not on file   Social History Narrative    Not on file     Social Determinants of Health     Financial Resource Strain:     Difficulty of Paying Living Expenses: Not on file   Food Insecurity:     Worried About [address] in the Last Year: Not on file    [address] in the Last Year: Not on file   Transportation Needs:     Lack of Transportation (Medical):  Not on file    Lack of Transportation (Non-Medical): Not on file   Physical Activity:     Days of Exercise per Week: Not on file    Minutes of Exercise per Session: Not on file   Stress:     Feeling of Stress : Not on file   Social Connections:     Frequency of Communication with Friends and Family: Not on file    Frequency of Social Gatherings with Friends and Family: Not on file    Attends Nondenominational Services: Not on file    Active Member of [address] [place] [address] or Organizations: Not on file    Attends Club or Organization Meetings: Not on file    Marital Status: Not on file   Intimate Partner Violence:     Fear of Current or Ex-Partner: Not on file    Emotionally Abused: Not on file    Physically Abused: Not on file    Sexually Abused: Not on file   Housing Stability:     Unable to Pay for Housing in the Last Year: Not on file    Number of [place] in the Last Year: Not on file    Unstable Housing in the Last Year: Not on file     Family History   Problem Relation Age of Onset    Stroke Mother     Cancer Sister     Diabetes Sister     Hypertension Sister     Dementia Brother      Current Outpatient Medications   Medication Sig Dispense Refill    therapeutic multivitamin (THERAGRAN) tablet Take 1 Tablet by mouth daily.  losartan (COZAAR) 50 mg tablet Take 1 Tablet by mouth two (2) times a day. 30 Tablet 2    verapamiL (CALAN) 80 mg tablet Take 1 Tablet by mouth two (2) times a day. 30 Tablet 2    metFORMIN (GLUCOPHAGE) 850 mg tablet Take 1 Tablet by mouth two (2) times a day. 30 Tablet 2    OTHER Take 5 mg by mouth nightly. glibenclamida - diabetes from [place]      OTHER Take 2 mg by mouth nightly. tolterodina - bladder - from [place]      indomethacin (INDOCIN) 25 mg capsule Take 25 mg by mouth two (2) times a day.        Facility-Administered Medications Ordered in Other Visits   Medication Dose Route Frequency Provider Last Rate Last Admin    0.9% sodium chloride infusion  20 mL/hr IntraVENous CONTINUOUS Anna Baker MD 20 mL/hr at 22 0924 20 mL/hr at 22 1207    heparin (porcine) 100 unit/mL injection 500 Units  500 Units InterCATHeter PRN Severino Fletcher MD   500 Units at 22 1024       Review of Systems:    General :The patient has no complaints and abdomen is healing from the hernia repair    Psychological : patient denies having any psychological symptoms such as hallucinations depression or anxiety. Ophthalmic:the patient denies having any visual impairment or eye discomfort. Allergy and Immunology:the patient denies having any seasonal allergies or allergies to medications other than those already outlined above. Hematological and Lymphatic: the patient denies having any bruising, bleeding or lymphadenopathy. Endocrine: the patient denies having any heat or cold intolerance. There is no history of diabetes or thyroid disorders. Breast: the patient denies having any history of breast mass or lumps. Respiratory:the patient denies having any cough, shortness of breath, or dyspnea on exertion. Cardiovascular: there are no complaints of chest pain, palpitations, chest pounding, or dyspnea on exertion. Gastrointestinal: the patient denies having nausea, emesis, diarrhea, constipation, or blood in the stool. Genito-Urinary: the patient denies having urinary urgency, frequency, or dysuria. Musculoskeletal: with the exception of mild arthralgias the patient has no other musculoskeletal complaints. Neurological:  denies having any numbness, tingling, or neurologic deficits. Dermatological: patient denies having any unexplained rash, skin ulcerations, or hives.     Objective:     Visit Vitals  BP (!) 150/77   Pulse 76   Temp 97.5 °F (36.4 °C)   Resp 15   Ht 4' 11\" (1.499 m)   Wt 74.7 kg (164 lb 9.6 oz)   SpO2 97%   BMI 33.25 kg/m²     Pain Score: 4    Physical Exam:     ECO    General: Well appearing, in NAD    Psychologic: mood and affect are appropriate, no anxiety or depression noted    Skin: examination of the skin reveals no bruising, rash or petechiae    HEENT: Normocephalic, atraumatic. Conjunctiva and sclera are clear. Pupils are equal, round and reactive to light. EOMs are intact. Neck: supple without lymphadenopathy, JVD or thyromegaly    Lymphatics: no palpable cervical, supraclavicular, infraclavicular, axillary or inguinal lymphadenopathy    Lungs: clear breath sounds bilaterally, no rhonchi or wheezes noted    Heart: Regular rate and rhythm, no murmurs, rubs or gallops, S1-S2 noted. Positive peripheral pulses bilaterally upper and lower extremities    Abdomen: soft, some tenderness post op, non-distended, no HSM    Extremities: without clubbing, cyanosis or edema    Neurologic: no focal deficits, steady gait, Alert and oriented x 3. Laboratory Data:     Results for orders placed or performed during the hospital encounter of 05/17/22   CBC WITH 3 PART DIFF     Status: Abnormal   Result Value Ref Range Status    WBC 5.4 4.5 - 13.0 K/uL Final    RBC 4.23 4. 10 - 5.10 M/uL Final    HGB 11.2 (L) 12.0 - 16.0 g/dL Final    HCT 36.3 36 - 48 % Final    MCV 85.8 78 - 102 FL Final    MCH 26.5 25.0 - 35.0 PG Final    MCHC 30.9 (L) 31 - 37 g/dL Final    RDW 13.0 11.5 - 14.5 % Final    PLATELET 353 872 - 216 K/uL Final    NEUTROPHILS 71 (H) 40 - 70 % Final    Mixed cells 14 0.1 - 17 % Final    LYMPHOCYTES 15 14 - 44 % Final    ABS. NEUTROPHILS 3.8 1.8 - 9.5 K/UL Final    ABS. MIXED CELLS 0.8 0.0 - 2.3 K/uL Final    ABS. LYMPHOCYTES 0.8 (L) 1.1 - 5.9 K/UL Final     Comment: Test performed at [address] [place] [address] or Outpatient Infusion Center Location. Reviewed by Medical Director.     DF AUTOMATED   Final       Patient Active Problem List   Diagnosis Code    Lump of skin of right lower extremity R22.41    Primary osteoarthritis of both knees M17.0    Diabetes mellitus without complication ([facility] Q70.5    Helicobacter pylori antibody positive R76.8         Assessment:     1. Diffuse large B-cell lymphoma of lymph nodes of lower extremity (HCC)    2. Fibroadenoma of breast, left    3. Diabetes 1.5, managed as type 2 ([address]    4. Primary osteoarthritis of both knees    5. PUD (peptic ulcer disease)        Plan:     1. Dr Timothy Alfred performed hernia repair and he confirmed that we may start chemotherapy  2. .  Should follow-up with the patient at that point and hopefully will be able to start the chemotherapy soon after. 3. We are planning on mini R-CHOP in two weeks  4. R - Rituxan  5.              C Cytoxan  6.              H Adriamycin  7. O Vincrisstine  8. P Prednisone  9. Patient and her daughter had opportunity to ask multiple questions with the assistance of a  on several visits. 10. They are in agreement with this plan    R-mini-CHOP every 21 days    rituximab 375 mg/m2,                      Day 1  cyclophosphamide 400 mg/m2,      Day 1   doxorubicin 25 mg/m2,                    Day 1  vincristine 1 mg                               Day 1   40 mg/m2 prednisone    PO            Days 1 to 5     Follow-up and Dispositions  ·   Return in about 1 month (around 7/16/2022) for Follow up with labs, Follow_up In Person. Routing History        No orders of the defined types were placed in this encounter.       Robby Vega MD  6/16/2022

## 2022-06-16 NOTE — PROGRESS NOTES
Patient seen and examined. She is doing well. Her wounds are healing well and her abdomen is soft and nontender. She will follow-up with the medical oncology team.  Follow-up with me as needed.

## 2022-06-16 NOTE — PROGRESS NOTES
Clay Villanueva is a 66 y.o. female (: 1950) presenting to address:    Chief Complaint   Patient presents with    Follow-up       Medication list and allergies have been reviewed with Clay Villanueva and updated as of today's date. I have gone over all Medical, Surgical and Social History with Clay Villanueva and updated/added the information accordingly. 1. Have you been to the ER, Urgent Care or Hospitalized since your last visit? NO      2. Have you followed up with your PCP or any other Physicians since your procedure/ last office visit?    YES

## 2022-06-24 PROBLEM — C83.30 DLBCL (DIFFUSE LARGE B CELL LYMPHOMA) (HCC): Status: ACTIVE | Noted: 2022-06-24

## 2022-06-24 RX ORDER — HEPARIN 100 UNIT/ML
300-500 SYRINGE INTRAVENOUS AS NEEDED
Status: CANCELLED
Start: 2022-06-29

## 2022-06-24 RX ORDER — SODIUM CHLORIDE 9 MG/ML
25 INJECTION, SOLUTION INTRAVENOUS CONTINUOUS
Status: CANCELLED | OUTPATIENT
Start: 2022-06-29

## 2022-06-24 RX ORDER — SODIUM CHLORIDE 9 MG/ML
10 INJECTION INTRAMUSCULAR; INTRAVENOUS; SUBCUTANEOUS AS NEEDED
Status: CANCELLED | OUTPATIENT
Start: 2022-06-29

## 2022-06-24 RX ORDER — DIPHENHYDRAMINE HYDROCHLORIDE 50 MG/ML
50 INJECTION, SOLUTION INTRAMUSCULAR; INTRAVENOUS ONCE
Status: CANCELLED
Start: 2022-06-29 | End: 2022-06-29

## 2022-06-24 RX ORDER — SODIUM CHLORIDE 9 MG/ML
100 INJECTION, SOLUTION INTRAVENOUS CONTINUOUS
Status: CANCELLED | OUTPATIENT
Start: 2022-06-29

## 2022-06-24 RX ORDER — ACETAMINOPHEN 325 MG/1
650 TABLET ORAL ONCE
Status: CANCELLED
Start: 2022-06-29 | End: 2022-06-29

## 2022-06-24 RX ORDER — ALBUTEROL SULFATE 0.83 MG/ML
2.5 SOLUTION RESPIRATORY (INHALATION) AS NEEDED
Status: CANCELLED
Start: 2022-06-29

## 2022-06-24 RX ORDER — DIPHENHYDRAMINE HYDROCHLORIDE 50 MG/ML
25 INJECTION, SOLUTION INTRAMUSCULAR; INTRAVENOUS AS NEEDED
Status: CANCELLED
Start: 2022-06-29

## 2022-06-24 RX ORDER — PALONOSETRON 0.05 MG/ML
0.25 INJECTION, SOLUTION INTRAVENOUS ONCE
Status: CANCELLED | OUTPATIENT
Start: 2022-06-29 | End: 2022-06-29

## 2022-06-24 RX ORDER — HYDROCORTISONE SODIUM SUCCINATE 100 MG/2ML
100 INJECTION, POWDER, FOR SOLUTION INTRAMUSCULAR; INTRAVENOUS AS NEEDED
Status: CANCELLED | OUTPATIENT
Start: 2022-06-29

## 2022-06-24 RX ORDER — DOXORUBICIN HYDROCHLORIDE 2 MG/ML
25 INJECTION, SOLUTION INTRAVENOUS ONCE
Status: CANCELLED
Start: 2022-06-29 | End: 2022-06-29

## 2022-06-24 RX ORDER — ONDANSETRON 2 MG/ML
8 INJECTION INTRAMUSCULAR; INTRAVENOUS AS NEEDED
Status: CANCELLED | OUTPATIENT
Start: 2022-06-29

## 2022-06-24 RX ORDER — DIPHENHYDRAMINE HYDROCHLORIDE 50 MG/ML
50 INJECTION, SOLUTION INTRAMUSCULAR; INTRAVENOUS AS NEEDED
Status: CANCELLED
Start: 2022-06-29

## 2022-06-24 RX ORDER — SODIUM CHLORIDE 0.9 % (FLUSH) 0.9 %
10 SYRINGE (ML) INJECTION AS NEEDED
Status: CANCELLED | OUTPATIENT
Start: 2022-06-29

## 2022-06-24 RX ORDER — EPINEPHRINE 1 MG/ML
0.3 INJECTION, SOLUTION, CONCENTRATE INTRAVENOUS AS NEEDED
Status: CANCELLED | OUTPATIENT
Start: 2022-06-29

## 2022-06-24 RX ORDER — ACETAMINOPHEN 325 MG/1
650 TABLET ORAL AS NEEDED
Status: CANCELLED
Start: 2022-06-29

## 2022-06-27 ENCOUNTER — TRANSCRIBE ORDER (OUTPATIENT)
Dept: INTERVENTIONAL RADIOLOGY/VASCULAR | Age: 72
End: 2022-06-27

## 2022-06-27 ENCOUNTER — TELEPHONE (OUTPATIENT)
Age: 72
End: 2022-06-27

## 2022-06-27 ENCOUNTER — HOSPITAL ENCOUNTER (OUTPATIENT)
Dept: LAB | Age: 72
Discharge: HOME | End: 2022-06-27

## 2022-06-27 ENCOUNTER — HOSPITAL ENCOUNTER (OUTPATIENT)
Dept: INTERVENTIONAL RADIOLOGY/VASCULAR | Age: 72
Discharge: HOME | End: 2022-06-27
Attending: INTERNAL MEDICINE

## 2022-06-27 ENCOUNTER — HOSPITAL ENCOUNTER (OUTPATIENT)
Dept: INFUSION THERAPY | Age: 72
Discharge: HOME | End: 2022-06-27

## 2022-06-27 VITALS
RESPIRATION RATE: 16 BRPM | BODY MASS INDEX: 33.06 KG/M2 | HEART RATE: 68 BPM | SYSTOLIC BLOOD PRESSURE: 151 MMHG | WEIGHT: 164 LBS | DIASTOLIC BLOOD PRESSURE: 75 MMHG | HEIGHT: 59 IN

## 2022-06-27 DIAGNOSIS — T82.9XXA: Primary | ICD-10-CM

## 2022-06-27 DIAGNOSIS — D24.2 FIBROADENOMA OF BREAST, LEFT: ICD-10-CM

## 2022-06-27 DIAGNOSIS — E55.9 VITAMIN D DEFICIENCY: ICD-10-CM

## 2022-06-27 DIAGNOSIS — C83.35 DIFFUSE LARGE B-CELL LYMPHOMA OF LYMPH NODES OF LOWER EXTREMITY (HCC): ICD-10-CM

## 2022-06-27 DIAGNOSIS — T82.9XXA: ICD-10-CM

## 2022-06-27 DIAGNOSIS — E13.9 DIABETES 1.5, MANAGED AS TYPE 2 (HCC): ICD-10-CM

## 2022-06-27 DIAGNOSIS — M17.0 PRIMARY OSTEOARTHRITIS OF BOTH KNEES: ICD-10-CM

## 2022-06-27 DIAGNOSIS — E53.8 VITAMIN B 12 DEFICIENCY: ICD-10-CM

## 2022-06-27 DIAGNOSIS — D50.8 OTHER IRON DEFICIENCY ANEMIA: ICD-10-CM

## 2022-06-27 DIAGNOSIS — C83.35 DIFFUSE LARGE B-CELL LYMPHOMA OF LYMPH NODES OF LOWER EXTREMITY (HCC): Primary | ICD-10-CM

## 2022-06-27 DIAGNOSIS — K27.9 PUD (PEPTIC ULCER DISEASE): ICD-10-CM

## 2022-06-27 LAB
25(OH)D3 SERPL-MCNC: 42.6 NG/ML (ref 30–100)
ALBUMIN SERPL-MCNC: 3.5 G/DL (ref 3.4–5)
ALBUMIN/GLOB SERPL: 1 {RATIO} (ref 0.8–1.7)
ALP SERPL-CCNC: 103 U/L (ref 45–117)
ALT SERPL-CCNC: 18 U/L (ref 13–56)
ANION GAP SERPL CALC-SCNC: 3 MMOL/L (ref 3–18)
AST SERPL-CCNC: 13 U/L (ref 10–38)
BASOPHILS # BLD: 0.1 K/UL (ref 0–0.1)
BASOPHILS NFR BLD: 1 % (ref 0–2)
BILIRUB SERPL-MCNC: 0.4 MG/DL (ref 0.2–1)
BUN SERPL-MCNC: 23 MG/DL (ref 7–18)
BUN/CREAT SERPL: 27 (ref 12–20)
CALCIUM SERPL-MCNC: 9.5 MG/DL (ref 8.5–10.1)
CHLORIDE SERPL-SCNC: 104 MMOL/L (ref 100–111)
CO2 SERPL-SCNC: 31 MMOL/L (ref 21–32)
CREAT SERPL-MCNC: 0.85 MG/DL (ref 0.6–1.3)
DIFFERENTIAL METHOD BLD: ABNORMAL
EOSINOPHIL # BLD: 0.5 K/UL (ref 0–0.4)
EOSINOPHIL NFR BLD: 8 % (ref 0–5)
ERYTHROCYTE [DISTWIDTH] IN BLOOD BY AUTOMATED COUNT: 13.9 % (ref 11.6–14.5)
FERRITIN SERPL-MCNC: 56 NG/ML (ref 8–388)
FOLATE SERPL-MCNC: 13.4 NG/ML (ref 3.1–17.5)
GLOBULIN SER CALC-MCNC: 3.5 G/DL (ref 2–4)
GLUCOSE SERPL-MCNC: 119 MG/DL (ref 74–99)
HCT VFR BLD AUTO: 36.4 % (ref 35–45)
HGB BLD-MCNC: 11.6 G/DL (ref 12–16)
IMM GRANULOCYTES # BLD AUTO: 0 K/UL (ref 0–0.04)
IMM GRANULOCYTES NFR BLD AUTO: 0 % (ref 0–0.5)
INR PPP: 1 (ref 0.8–1.2)
IRON SATN MFR SERPL: 12 % (ref 20–50)
IRON SERPL-MCNC: 46 UG/DL (ref 50–175)
LDH SERPL L TO P-CCNC: 262 U/L (ref 81–234)
LYMPHOCYTES # BLD: 1.1 K/UL (ref 0.9–3.6)
LYMPHOCYTES NFR BLD: 16 % (ref 21–52)
MCH RBC QN AUTO: 26.4 PG (ref 24–34)
MCHC RBC AUTO-ENTMCNC: 31.9 G/DL (ref 31–37)
MCV RBC AUTO: 82.7 FL (ref 78–100)
MONOCYTES # BLD: 0.8 K/UL (ref 0.05–1.2)
MONOCYTES NFR BLD: 12 % (ref 3–10)
NEUTS SEG # BLD: 4 K/UL (ref 1.8–8)
NEUTS SEG NFR BLD: 63 % (ref 40–73)
NRBC # BLD: 0 K/UL (ref 0–0.01)
NRBC BLD-RTO: 0 PER 100 WBC
PLATELET # BLD AUTO: 277 K/UL (ref 135–420)
PMV BLD AUTO: 9.6 FL (ref 9.2–11.8)
POTASSIUM SERPL-SCNC: 4.3 MMOL/L (ref 3.5–5.5)
PROT SERPL-MCNC: 7 G/DL (ref 6.4–8.2)
PROTHROMBIN TIME: 13 SEC (ref 11.5–15.2)
RBC # BLD AUTO: 4.4 M/UL (ref 4.2–5.3)
SODIUM SERPL-SCNC: 138 MMOL/L (ref 136–145)
TIBC SERPL-MCNC: 371 UG/DL (ref 250–450)
URATE SERPL-MCNC: 4.4 MG/DL (ref 2.6–7.2)
VIT B12 SERPL-MCNC: 357 PG/ML (ref 211–911)
WBC # BLD AUTO: 6.4 K/UL (ref 4.6–13.2)

## 2022-06-27 PROCEDURE — 82784 ASSAY IGA/IGD/IGG/IGM EACH: CPT

## 2022-06-27 PROCEDURE — 87340 HEPATITIS B SURFACE AG IA: CPT

## 2022-06-27 PROCEDURE — 96523 IRRIG DRUG DELIVERY DEVICE: CPT

## 2022-06-27 PROCEDURE — 36415 COLL VENOUS BLD VENIPUNCTURE: CPT

## 2022-06-27 PROCEDURE — 86706 HEP B SURFACE ANTIBODY: CPT

## 2022-06-27 PROCEDURE — 82306 VITAMIN D 25 HYDROXY: CPT

## 2022-06-27 PROCEDURE — 83540 ASSAY OF IRON: CPT

## 2022-06-27 PROCEDURE — 83615 LACTATE (LD) (LDH) ENZYME: CPT

## 2022-06-27 PROCEDURE — 86704 HEP B CORE ANTIBODY TOTAL: CPT

## 2022-06-27 PROCEDURE — 80053 COMPREHEN METABOLIC PANEL: CPT

## 2022-06-27 PROCEDURE — 82728 ASSAY OF FERRITIN: CPT

## 2022-06-27 PROCEDURE — 85610 PROTHROMBIN TIME: CPT

## 2022-06-27 PROCEDURE — 84484 ASSAY OF TROPONIN QUANT: CPT

## 2022-06-27 PROCEDURE — 82607 VITAMIN B-12: CPT

## 2022-06-27 PROCEDURE — 36598 INJ W/FLUOR EVAL CV DEVICE: CPT

## 2022-06-27 PROCEDURE — 77030012965 HC NDL HUBR BBMI -A

## 2022-06-27 PROCEDURE — 84550 ASSAY OF BLOOD/URIC ACID: CPT

## 2022-06-27 PROCEDURE — 85025 COMPLETE CBC W/AUTO DIFF WBC: CPT

## 2022-06-27 RX ORDER — PREDNISONE 10 MG/1
70 TABLET ORAL DAILY
Qty: 35 TABLET | Refills: 5 | Status: SHIPPED | OUTPATIENT
Start: 2022-06-27 | End: 2022-07-27

## 2022-06-27 RX ORDER — PROCHLORPERAZINE MALEATE 10 MG
5 TABLET ORAL
Qty: 40 TABLET | Refills: 5 | Status: SHIPPED | OUTPATIENT
Start: 2022-06-27 | End: 2022-12-24

## 2022-06-27 RX ORDER — LIDOCAINE AND PRILOCAINE 25; 25 MG/G; MG/G
CREAM TOPICAL
Qty: 30 G | Refills: 5 | Status: SHIPPED | OUTPATIENT
Start: 2022-06-27 | End: 2022-12-27

## 2022-06-27 NOTE — TELEPHONE ENCOUNTER
Patients daughter called to advised her mother will need an order for Numbing cream, nausea/dizzness and Prednisona, she is having her treatment @ [facility]

## 2022-06-27 NOTE — PROGRESS NOTES
Spoke with patient and her daughter to arrange appointment. Pt to arrive at 0730 for 0830 appointment. Pt to be NPO. Pt will need translation services.

## 2022-06-27 NOTE — PROGRESS NOTES
Ms. Marilynn Degroot arrived ambulatory with her daughter at her side. Attempted to access patient's port without success. Resistance met immediately. Dr. Fuller Mode called and notified. Orders received for patient to be seen by IR to evaluate. Patient shown to registration department to be seen by IR today.

## 2022-06-28 ENCOUNTER — APPOINTMENT (OUTPATIENT)
Dept: INFUSION THERAPY | Age: 72
End: 2022-06-28

## 2022-06-28 ENCOUNTER — HOSPITAL ENCOUNTER (OUTPATIENT)
Dept: INTERVENTIONAL RADIOLOGY/VASCULAR | Age: 72
Discharge: HOME | End: 2022-06-28
Attending: INTERNAL MEDICINE | Admitting: RADIOLOGY

## 2022-06-28 VITALS
DIASTOLIC BLOOD PRESSURE: 75 MMHG | OXYGEN SATURATION: 98 % | SYSTOLIC BLOOD PRESSURE: 142 MMHG | HEIGHT: 59 IN | HEART RATE: 64 BPM | BODY MASS INDEX: 33.34 KG/M2 | TEMPERATURE: 97.6 F | WEIGHT: 165.4 LBS | RESPIRATION RATE: 16 BRPM

## 2022-06-28 DIAGNOSIS — T82.9XXA: ICD-10-CM

## 2022-06-28 LAB
HBV SURFACE AB SER QL IA: POSITIVE
HBV SURFACE AB SERPL IA-ACNC: 17.76 MIU/ML
HBV SURFACE AG SER QL: <0.1 INDEX
HBV SURFACE AG SER QL: NEGATIVE
HEP BS AB COMMENT,HBSAC: NORMAL
TROPONIN-HIGH SENSITIVITY: 4 NG/L (ref 0–54)

## 2022-06-28 PROCEDURE — 99152 MOD SED SAME PHYS/QHP 5/>YRS: CPT

## 2022-06-28 PROCEDURE — 74011250636 HC RX REV CODE- 250/636: Performed by: RADIOLOGY

## 2022-06-28 PROCEDURE — 74011250637 HC RX REV CODE- 250/637: Performed by: RADIOLOGY

## 2022-06-28 PROCEDURE — 74011000250 HC RX REV CODE- 250: Performed by: RADIOLOGY

## 2022-06-28 PROCEDURE — 99153 MOD SED SAME PHYS/QHP EA: CPT

## 2022-06-28 PROCEDURE — 36576 REPAIR TUNNELED CV CATH: CPT

## 2022-06-28 RX ORDER — LIDOCAINE HYDROCHLORIDE 10 MG/ML
1-20 INJECTION, SOLUTION EPIDURAL; INFILTRATION; INTRACAUDAL; PERINEURAL
Status: COMPLETED | OUTPATIENT
Start: 2022-06-28 | End: 2022-06-28

## 2022-06-28 RX ORDER — FENTANYL CITRATE 50 UG/ML
25-100 INJECTION, SOLUTION INTRAMUSCULAR; INTRAVENOUS
Status: DISCONTINUED | OUTPATIENT
Start: 2022-06-28 | End: 2022-06-28 | Stop reason: HOSPADM

## 2022-06-28 RX ORDER — SODIUM CHLORIDE 0.9 % (FLUSH) 0.9 %
5-40 SYRINGE (ML) INJECTION AS NEEDED
Status: DISCONTINUED | OUTPATIENT
Start: 2022-06-28 | End: 2022-06-28 | Stop reason: HOSPADM

## 2022-06-28 RX ORDER — MIDAZOLAM HYDROCHLORIDE 1 MG/ML
.5-2 INJECTION, SOLUTION INTRAMUSCULAR; INTRAVENOUS
Status: DISCONTINUED | OUTPATIENT
Start: 2022-06-28 | End: 2022-06-28 | Stop reason: HOSPADM

## 2022-06-28 RX ORDER — ACETAMINOPHEN 500 MG
1000 TABLET ORAL ONCE
Status: COMPLETED | OUTPATIENT
Start: 2022-06-28 | End: 2022-06-28

## 2022-06-28 RX ORDER — HEPARIN SODIUM (PORCINE) LOCK FLUSH IV SOLN 100 UNIT/ML 100 UNIT/ML
500 SOLUTION INTRAVENOUS
Status: DISCONTINUED | OUTPATIENT
Start: 2022-06-28 | End: 2022-06-28 | Stop reason: HOSPADM

## 2022-06-28 RX ORDER — CLINDAMYCIN PHOSPHATE 900 MG/50ML
900 INJECTION INTRAVENOUS
Status: COMPLETED | OUTPATIENT
Start: 2022-06-28 | End: 2022-06-28

## 2022-06-28 RX ORDER — HEPARIN SODIUM 200 [USP'U]/100ML
500 INJECTION, SOLUTION INTRAVENOUS
Status: COMPLETED | OUTPATIENT
Start: 2022-06-28 | End: 2022-06-28

## 2022-06-28 RX ORDER — SODIUM CHLORIDE 9 MG/ML
20 INJECTION, SOLUTION INTRAVENOUS CONTINUOUS
Status: DISCONTINUED | OUTPATIENT
Start: 2022-06-28 | End: 2022-06-28 | Stop reason: HOSPADM

## 2022-06-28 RX ORDER — NALOXONE HYDROCHLORIDE 0.4 MG/ML
0.1 INJECTION, SOLUTION INTRAMUSCULAR; INTRAVENOUS; SUBCUTANEOUS
Status: DISCONTINUED | OUTPATIENT
Start: 2022-06-28 | End: 2022-06-28 | Stop reason: HOSPADM

## 2022-06-28 RX ORDER — FLUMAZENIL 0.1 MG/ML
0.2 INJECTION INTRAVENOUS
Status: DISCONTINUED | OUTPATIENT
Start: 2022-06-28 | End: 2022-06-28 | Stop reason: HOSPADM

## 2022-06-28 RX ORDER — SODIUM CHLORIDE 0.9 % (FLUSH) 0.9 %
5-40 SYRINGE (ML) INJECTION EVERY 8 HOURS
Status: DISCONTINUED | OUTPATIENT
Start: 2022-06-28 | End: 2022-06-28 | Stop reason: HOSPADM

## 2022-06-28 RX ADMIN — ACETAMINOPHEN 1000 MG: 500 TABLET ORAL at 11:12

## 2022-06-28 RX ADMIN — LIDOCAINE HYDROCHLORIDE ANHYDROUS 5 ML: 10 INJECTION, SOLUTION INFILTRATION at 09:23

## 2022-06-28 RX ADMIN — MIDAZOLAM HYDROCHLORIDE 0.5 MG: 1 INJECTION, SOLUTION INTRAMUSCULAR; INTRAVENOUS at 09:23

## 2022-06-28 RX ADMIN — HEPARIN SODIUM IN SODIUM CHLORIDE 1000 UNITS: 200 INJECTION INTRAVENOUS at 09:23

## 2022-06-28 RX ADMIN — HEPARIN SODIUM (PORCINE) LOCK FLUSH IV SOLN 100 UNIT/ML 300 UNITS: 100 SOLUTION at 09:41

## 2022-06-28 RX ADMIN — FENTANYL CITRATE 25 MCG: 50 INJECTION, SOLUTION INTRAMUSCULAR; INTRAVENOUS at 09:23

## 2022-06-28 RX ADMIN — SODIUM CHLORIDE 20 ML/HR: 9 INJECTION, SOLUTION INTRAVENOUS at 08:10

## 2022-06-28 RX ADMIN — CLINDAMYCIN PHOSPHATE 900 MG: 900 INJECTION, SOLUTION INTRAVENOUS at 08:50

## 2022-06-28 NOTE — DISCHARGE INSTRUCTIONS
Patient Education     DISCHARGE SUMMARY from Nurse    PATIENT INSTRUCTIONS:    After general anesthesia or intravenous sedation, for 24 hours or while taking prescription Narcotics:  · Limit your activities  · Do not drive and operate hazardous machinery  · Do not make important personal or business decisions  · Do  not drink alcoholic beverages  · If you have not urinated within 8 hours after discharge, please contact your surgeon on call. Report the following to your surgeon:  · Excessive pain, swelling, redness or odor of or around the surgical area  · Temperature over 100.5  · Nausea and vomiting lasting longer than 4 hours or if unable to take medications  · Any signs of decreased circulation or nerve impairment to extremity: change in color, persistent  numbness, tingling, coldness or increase pain  · Any questions    What to do at Home:  Recommended activity:     If you experience any of the following symptoms , please follow up with MD.    *  Please give a list of your current medications to your Primary Care Provider. *  Please update this list whenever your medications are discontinued, doses are      changed, or new medications (including over-the-counter products) are added. *  Please carry medication information at all times in case of emergency situations. These are general instructions for a healthy lifestyle:    No smoking/ No tobacco products/ Avoid exposure to second hand smoke  Surgeon General's Warning:  Quitting smoking now greatly reduces serious risk to your health.     Obesity, smoking, and sedentary lifestyle greatly increases your risk for illness    A healthy diet, regular physical exercise & weight monitoring are important for maintaining a healthy lifestyle    You may be retaining fluid if you have a history of heart failure or if you experience any of the following symptoms:  Weight gain of 3 pounds or more overnight or 5 pounds in a week, increased swelling in our hands or feet or shortness of breath while lying flat in bed. Please call your doctor as soon as you notice any of these symptoms; do not wait until your next office visit. The discharge information has been reviewed with the patient and caregiver. The patient and caregiver verbalized understanding. Discharge medications reviewed with the patient and caregiver and appropriate educational materials and side effects teaching were provided. ___________________________________________________________________________________________________________________________________     Implanted Port Care for Chemotherapy: Care Instructions  Overview  An implanted port is a device that's placed, in most cases, under the skin of your chest below your collarbone. It's made of plastic, stainless steel, or titanium. The port is about the size of a quarter, but thicker. A thin, flexible tube called a catheter runs from the port under your skin into a large vein. A membrane (septum) similar to a pencil eraser is in the center of the port. A nurse uses a needle to put chemotherapy or other medicine and fluids through the septum into a blood vessel. The port may be used to draw blood for tests only if another vein, such as in the hand or arm, can't be used. An implanted port can be used for months. A special needle (called a Livingston needle) may stay in the port for a short time. The port needs regular care to make sure that it doesn't get blocked. Tell your doctor if you take aspirin or some other blood thinner. These medicines can increase the chance of bleeding inside your body. Follow-up care is a key part of your treatment and safety. Be sure to make and go to all appointments, and call your doctor if you are having problems. It's also a good idea to know your test results and keep a list of the medicines you take. How can you care for yourself at home?   · You will probably need to take 1 day off from work and will be able return to normal activities shortly after. This depends on the type of work you do, why you have the port, and how you feel. · You probably will be able to bathe and swim. But you may need to avoid some activities if a Livingston needle is left in the port. Talk to your doctor about any limits on your activity. · Some clothes may rub the skin over the port. Do not wear a bra or suspenders that irritate your skin near the port. · You will get a medical alert card with information about your port. Carry this with you. It will tell health care workers you have a port in case you need emergency care. · Your port will need regular flushing to keep it open. A nurse or other health professional will do this for you. When should you call for help? Call your doctor now or seek immediate medical care if:    · You have signs of infection, such as:  ? Increased pain, swelling, warmth, or redness near the port. ? Red streaks leading from the port. ? Pus draining from the port. ? A fever.     · You have pain or swelling in your neck or arm.     · You have trouble breathing. Watch closely for changes in your health, and be sure to contact your doctor if:    · You have any problems with your port. Where can you learn more? Go to http://www.gray.com/  Enter P577 in the search box to learn more about \"Implanted [facility] for Chemotherapy: Care Instructions. \"  Current as of: July 1, 2021               Content Version: 13.2  © 2006-2022 Healthwise[employer] Incorporated. Care instructions adapted under license by [employer] (which disclaims liability or warranty for this information). If you have questions about a medical condition or this instruction, always ask your healthcare professional. [place] [address] any warranty or liability for your use of this information.   Patient armband removed and shredded

## 2022-06-28 NOTE — PROGRESS NOTES
TRANSFER - OUT REPORT:    Verbal report given to Dea Herrera RN (name) on Srikanth Faith  being transferred to Care Unit (unit) for routine progression of care       Report consisted of patients Situation, Background, Assessment and   Recommendations(SBAR). Information from the following report(s) SBAR, Procedure Summary, MAR and Cardiac Rhythm NSR was reviewed with the receiving nurse. Lines:   Venous Access Device [facility] Lot [id number] 04/25/22 Upper chest (subclavicular area, right (Active)       Peripheral IV 06/28/22 Left;Posterior Wrist (Active)   Site Assessment Clean, dry, & intact 06/28/22 0809   Phlebitis Assessment 0 06/28/22 0809   Infiltration Assessment 0 06/28/22 0809   Dressing Status Clean, dry, & intact 06/28/22 0809   Dressing Type Tape;Transparent 06/28/22 0809   Hub Color/Line Status Flushed;Blue 06/28/22 0809   Action Taken Open ports on tubing capped 06/28/22 0809   Alcohol Cap Used Yes 06/28/22 0809        Opportunity for questions and clarification was provided.       Patient transported with:   Registered Nurse     Casie Story RN

## 2022-06-28 NOTE — PROGRESS NOTES
Patient takes no anticoagulants, and has had no prior issues with sedation medications. Education regarding procedure and sedation provided with  services; pt states understanding. Consent confirmed. Patient resting comfortably. Will continue to monitor.   Una Diaz RN

## 2022-06-28 NOTE — PROCEDURES
Interventional Radiology Brief Procedure Note    Interventional Radiologist: Marline Maria MD    Pre-operative Diagnosis: flipped port    Post-operative Diagnosis: Same as pre-operative diagnosis    Procedure(s) Performed:  Port revision    Anesthesia:  Local and Moderate Sedation    Findings: right chest wall port repositioned properly and sutured in place. Ready for immediate use.       Complications: None    Estimated Blood Loss:  minimal    Tubes and Drains: None    Specimens: None    Condition: Good    Marline Maria MD  [facility]  6/28/2022

## 2022-06-28 NOTE — PROGRESS NOTES
Prepped & ready for procedure. Interpretor video services used. 1000 Back from procedure. Dressing intact to right chest. No bleeding or swelling. Ice pack applied. Daughter at bedside. Pt sleepy but arouses. 1045 Awake, diet given. 1112 c/o of pain at port site,rates 8/10. Dr. Fajardo Cea aware, order received. 1115 Discharge instructions reviewed with pt & daughter. 1135 Discharged home via w/c in stable condition in care of daughter. Dressing intact & dry to right chest. No bleeding or swelling. Pain 6/10 improving with tylenol & ice pack.

## 2022-06-28 NOTE — PROGRESS NOTES
conducted an initial consultation and Spiritual Assessment for Gladys Patel, who is a 74 y.o.,female. Patients Primary Language is: [place]. According to the patients EMR Sabianism Affiliation is: Adventist. The reason the Patient came to the hospital is:   Patient Active Problem List    Diagnosis Date Noted    DLBCL (diffuse large B cell lymphoma) ([facility] 06/24/2022    Primary osteoarthritis of both knees 03/02/2022    [facility] 42/24/0386    Helicobacter pylori antibody positive 03/02/2022    Lump of skin of right lower extremity 02/28/2022        The  provided the following Interventions:  Initiated a relationship of care and support. Explored issues of cyrus, belief, spirituality and Hindu/ritual needs while hospitalized. Listened empathically. Provided chaplaincy education. Provided information about Spiritual Care Services. Offered prayer and assurance of continued prayers on patients behalf. Chart reviewed. The following outcomes were achieved:  Patient shared limited information about both their medical narrative and spiritual journey/beliefs. Patient processed feeling about current hospitalization. Patient expressed gratitude for pastoral care visit. Assessment:  Patient does not have any Hindu/cultural needs that will affect patients preferences in health care. There are no further spiritual or Hindu issues which require intervention at this time. Plan:  Chaplains will continue to follow and will provide pastoral care on an as needed/requested basis.  recommends bedside caregivers page  on duty if patient shows signs of acute spiritual or emotional distress.       Sister Geo Schwarz, [place], [address]  [phone]

## 2022-06-29 ENCOUNTER — HOSPITAL ENCOUNTER (OUTPATIENT)
Dept: INFUSION THERAPY | Age: 72
Discharge: HOME | End: 2022-06-29

## 2022-06-29 VITALS
RESPIRATION RATE: 16 BRPM | DIASTOLIC BLOOD PRESSURE: 74 MMHG | TEMPERATURE: 98.1 F | SYSTOLIC BLOOD PRESSURE: 148 MMHG | HEART RATE: 70 BPM

## 2022-06-29 DIAGNOSIS — C83.30 DIFFUSE LARGE B-CELL LYMPHOMA, UNSPECIFIED BODY REGION (HCC): Primary | ICD-10-CM

## 2022-06-29 LAB
HBV CORE AB SERPL QL IA: NEGATIVE
HBV CORE IGM SERPL QL IA: NEGATIVE
IGG SERPL-MCNC: 1090 MG/DL (ref 700–1600)

## 2022-06-29 PROCEDURE — 96375 TX/PRO/DX INJ NEW DRUG ADDON: CPT

## 2022-06-29 PROCEDURE — 96417 CHEMO IV INFUS EACH ADDL SEQ: CPT

## 2022-06-29 PROCEDURE — 96413 CHEMO IV INFUSION 1 HR: CPT

## 2022-06-29 PROCEDURE — 77030012965 HC NDL HUBR BBMI -A

## 2022-06-29 PROCEDURE — 74011250636 HC RX REV CODE- 250/636: Performed by: INTERNAL MEDICINE

## 2022-06-29 PROCEDURE — 96411 CHEMO IV PUSH ADDL DRUG: CPT

## 2022-06-29 PROCEDURE — 74011250637 HC RX REV CODE- 250/637: Performed by: INTERNAL MEDICINE

## 2022-06-29 PROCEDURE — 96377 APPLICATON ON-BODY INJECTOR: CPT

## 2022-06-29 PROCEDURE — 96367 TX/PROPH/DG ADDL SEQ IV INF: CPT

## 2022-06-29 PROCEDURE — 74011000258 HC RX REV CODE- 258: Performed by: INTERNAL MEDICINE

## 2022-06-29 PROCEDURE — 74011000250 HC RX REV CODE- 250: Performed by: INTERNAL MEDICINE

## 2022-06-29 RX ORDER — PALONOSETRON 0.05 MG/ML
0.25 INJECTION, SOLUTION INTRAVENOUS ONCE
Status: COMPLETED | OUTPATIENT
Start: 2022-06-29 | End: 2022-06-29

## 2022-06-29 RX ORDER — SODIUM CHLORIDE 0.9 % (FLUSH) 0.9 %
10 SYRINGE (ML) INJECTION AS NEEDED
Status: DISPENSED | OUTPATIENT
Start: 2022-06-29 | End: 2022-06-29

## 2022-06-29 RX ORDER — SODIUM CHLORIDE 9 MG/ML
100 INJECTION, SOLUTION INTRAVENOUS CONTINUOUS
Status: DISPENSED | OUTPATIENT
Start: 2022-06-29 | End: 2022-06-29

## 2022-06-29 RX ORDER — DOXORUBICIN HYDROCHLORIDE 2 MG/ML
25 INJECTION, SOLUTION INTRAVENOUS ONCE
Status: COMPLETED | OUTPATIENT
Start: 2022-06-29 | End: 2022-06-29

## 2022-06-29 RX ORDER — HEPARIN 100 UNIT/ML
300-500 SYRINGE INTRAVENOUS AS NEEDED
Status: DISPENSED | OUTPATIENT
Start: 2022-06-29 | End: 2022-06-29

## 2022-06-29 RX ORDER — DIPHENHYDRAMINE HYDROCHLORIDE 50 MG/ML
50 INJECTION, SOLUTION INTRAMUSCULAR; INTRAVENOUS ONCE
Status: COMPLETED | OUTPATIENT
Start: 2022-06-29 | End: 2022-06-29

## 2022-06-29 RX ORDER — ACETAMINOPHEN 325 MG/1
650 TABLET ORAL ONCE
Status: COMPLETED | OUTPATIENT
Start: 2022-06-29 | End: 2022-06-29

## 2022-06-29 RX ORDER — SODIUM CHLORIDE 9 MG/ML
25 INJECTION, SOLUTION INTRAVENOUS CONTINUOUS
Status: DISPENSED | OUTPATIENT
Start: 2022-06-29 | End: 2022-06-29

## 2022-06-29 RX ADMIN — DIPHENHYDRAMINE HYDROCHLORIDE 50 MG: 50 INJECTION, SOLUTION INTRAMUSCULAR; INTRAVENOUS at 09:00

## 2022-06-29 RX ADMIN — CYCLOPHOSPHAMIDE 700 MG: 500 INJECTION INTRAVENOUS at 14:48

## 2022-06-29 RX ADMIN — PALONOSETRON 0.25 MG: 0.25 INJECTION, SOLUTION INTRAVENOUS at 13:39

## 2022-06-29 RX ADMIN — DEXAMETHASONE SODIUM PHOSPHATE 12 MG: 10 INJECTION INTRAMUSCULAR; INTRAVENOUS at 14:26

## 2022-06-29 RX ADMIN — DOXORUBICIN HYDROCHLORIDE 44 MG: 2 INJECTION, SOLUTION INTRAVENOUS at 15:23

## 2022-06-29 RX ADMIN — VINCRISTINE SULFATE 1 MG: 1 INJECTION, SOLUTION INTRAVENOUS at 15:36

## 2022-06-29 RX ADMIN — SODIUM CHLORIDE 100 ML/HR: 0.9 INJECTION, SOLUTION INTRAVENOUS at 08:30

## 2022-06-29 RX ADMIN — SODIUM CHLORIDE 660 MG: 9 INJECTION, SOLUTION INTRAVENOUS at 09:57

## 2022-06-29 RX ADMIN — PEGFILGRASTIM 6 MG: KIT SUBCUTANEOUS at 16:47

## 2022-06-29 RX ADMIN — ACETAMINOPHEN 650 MG: 325 TABLET ORAL at 08:58

## 2022-06-29 RX ADMIN — SODIUM CHLORIDE 500 ML: 0.9 INJECTION, SOLUTION INTRAVENOUS at 13:30

## 2022-06-29 RX ADMIN — FOSAPREPITANT 150 MG: 150 INJECTION, POWDER, LYOPHILIZED, FOR SOLUTION INTRAVENOUS at 13:41

## 2022-06-29 RX ADMIN — Medication 500 UNITS: at 16:35

## 2022-06-29 RX ADMIN — SODIUM CHLORIDE, PRESERVATIVE FREE 10 ML: 5 INJECTION INTRAVENOUS at 16:35

## 2022-06-29 NOTE — PROGRESS NOTES
[address] [facility] Progress Note    Date: 2022    Name: Mundo Barba              MRN: [id number]              : 1950    Chemotherapy Cycle: C1D1  R-chop (Rituxan,Cytoxan, Adriamycin, Vincristine)   Prednisone po (Home days 1-5)    Ms. Kristen Jerome was assessed and education was provided. Ms. Kristen Jerome arrived ambulatory using cane accompanied by her daughter. Ms. Kristen Jreome speaks very little english per patient and daughter. Translation services being used via   . Treatment education reviewed with Ms. Kristen Jerome and daughter. Printed Ramona-comp reviewed and copy given in [address] [facility]. Patient and daughter verbalized understanding. Patient's dressing to right upper chest mediport site, clean dry and intact. Dressing removed. Incision well approximated and dry with scabbing noted. Patient's daughter stated she will be picking up patient's prednisone from pharmacy today and patient will begin taking today and continue for the ordered days 1-5. Ms. Bhargavi Harrison vitals were reviewed. Visit Vitals  BP (!) 148/74 (BP 1 Location: Left arm, BP Patient Position: Sitting)   Pulse 70   Temp 98.1 °F (36.7 °C)   Resp 16       Lab results were obtained and reviewed dated 22. EF 60-65 % on echo dated 22. Troponin WNL. Mediport accessed with 20 gauge 0.75 inch needle without complications. Port flushes without resistance and positive blood return noted. Treatment initiated per orders. NS IV @ 100 ml/hr initiated. Pre-medications (Tylenol 650 mg po, Benadryl 50 mg IV) were administered as ordered and 1st chemo initiated. Rituxan 660 mg was initiated at 50 mg/hr (25 ml/hr) for 30 minutes, increased by 50 mg/hr (25 ml/hr) every 30 minutes to max rate of 400 mg/hr (200 ml/hr) per orders without complications. Port flushed and blood return reverified.     Pre-medications (Emend 150 mg IV, Aloxi 0.25 mg IV) were administered as ordered and after 30 minutes next chemo initiated per orders. Cytoxan 700 mg IV was infused at 557 ml/hr without complications or reaction. Port flushed and blood return reverified. Adriamycin 44 mg IV push over 5 minutes per orders without complications or s/s of reaction. Port flushed and blood return reverified. Vincristine 1 mg IV was infused @ 300 ml/hr per orders without complications or s/s of reaction. Neulasta 6 mg on-body injector applied to patient's    . Instructions reviewed with patient and daughter on monitoring and when to remove device. Patient and daughter verbalized understanding. Port flushed and de-accessed per orders, per protocol without complications. Band aid applied to site. Ms. Erika Marley tolerated infusions, and had no complaints at this time. Ms. Erika Marley was discharged from [place] [address] in stable condition at 1700. She is to return on 7/15/22 at 7/18/22 for her next appointment.     Nivia Feng RN  June 29, 2022  11:13 AM

## 2022-06-29 NOTE — PROGRESS NOTES

## 2022-07-05 ENCOUNTER — TELEPHONE (OUTPATIENT)
Age: 72
End: 2022-07-05

## 2022-07-05 NOTE — TELEPHONE ENCOUNTER
Patient's daughter called and stated patient had to get surgery in order to get her port fixed as when it was first placed, it was put in upside down and blood work couldn't get taken. Patient's daughter believes that now the port \"looks flat under skin, and looks like its turned to the side. \" Patient's daughter is wondering if they should wait for their appointment on 7/7 with Netta Ramirez or go to the clinic or ED to get port fixed.

## 2022-07-05 NOTE — TELEPHONE ENCOUNTER
Spoke to patients daughter and advised her it was ok to wait and see us on 7/7 and not to go to the ED. Nguyễno advised if she was really concerned to go to THE [facility] clinic to have it looked at.

## 2022-07-07 ENCOUNTER — OFFICE VISIT (OUTPATIENT)
Age: 72
End: 2022-07-07

## 2022-07-07 VITALS
WEIGHT: 163.8 LBS | OXYGEN SATURATION: 96 % | DIASTOLIC BLOOD PRESSURE: 86 MMHG | HEIGHT: 59 IN | RESPIRATION RATE: 17 BRPM | BODY MASS INDEX: 33.02 KG/M2 | HEART RATE: 92 BPM | TEMPERATURE: 97.4 F | SYSTOLIC BLOOD PRESSURE: 130 MMHG

## 2022-07-07 DIAGNOSIS — C83.35 DIFFUSE LARGE B-CELL LYMPHOMA OF LYMPH NODES OF LOWER EXTREMITY (HCC): Primary | ICD-10-CM

## 2022-07-07 DIAGNOSIS — E13.9 DIABETES 1.5, MANAGED AS TYPE 2 (HCC): ICD-10-CM

## 2022-07-07 DIAGNOSIS — E55.9 VITAMIN D DEFICIENCY: ICD-10-CM

## 2022-07-07 DIAGNOSIS — M17.0 PRIMARY OSTEOARTHRITIS OF BOTH KNEES: ICD-10-CM

## 2022-07-07 PROCEDURE — 99214 OFFICE O/P EST MOD 30 MIN: CPT | Performed by: INTERNAL MEDICINE

## 2022-07-07 PROCEDURE — 1123F ACP DISCUSS/DSCN MKR DOCD: CPT | Performed by: INTERNAL MEDICINE

## 2022-07-07 PROCEDURE — 3044F HG A1C LEVEL LT 7.0%: CPT | Performed by: INTERNAL MEDICINE

## 2022-07-07 NOTE — PROGRESS NOTES
Hematology/Oncology  Progress Note    Name: Cuong Drafts  Date: 2022  : 1950  Primary Care Provider: Khanh Viramontes NP    Ms. Kamar Hines is a 74y.o. year old female with  Diffuse Large B-cell lymphoma. ,   left breast fibroadenoma as per biopsy,   progrssivley enlarging  abdominal hernia repaired    New PORT on right chest appears to be well positioned by my palpation    Right medial proximal leg adenopathy much smaller     Current Therapy:   R mini CHOP  At least 6 cycles perhaps 8        Subjective: The past medical, surgical and social history has been reviewed and remains unchanged.    Past Medical History:   Diagnosis Date    Arthritis     Diabetes ([facility]     Hypertension     Lymphoma ([facility]     Right leg     Past Surgical History:   Procedure Laterality Date    COLONOSCOPY N/A 2022    COLONOSCOPY performed by [facility] Motley [facility] at [facility] ENDOSCOPY    HX BACK SURGERY      HX  SECTION      HX CHOLECYSTECTOMY      2022    HX ENDOSCOPY      EGD 2022    HX HERNIA REPAIR      HX HYSTERECTOMY      IR BX BONE MARROW DIAGNOSTIC  2022    IR INSERT TUNL CVC W PORT OVER 5 YEARS  2022    IR REPAIR CVAD W PORT / PUMP  2022     Social History     Socioeconomic History    Marital status:      Spouse name: Not on file    Number of children: Not on file    Years of education: Not on file    Highest education level: Not on file   Occupational History    Not on file   Tobacco Use    Smoking status: Never Smoker    Smokeless tobacco: Never Used   Vaping Use    Vaping Use: Never used   Substance and Sexual Activity    Alcohol use: Never    Drug use: Never    Sexual activity: Not on file   Other Topics Concern    Not on file   Social History Narrative    Not on file     Social Determinants of Health     Financial Resource Strain:     Difficulty of Paying Living Expenses: Not on file   Food Insecurity:     Worried About Running Out of [employer] in the Last Year: Not on file    Ran Out of Food in the Last Year: Not on file   Transportation Needs:     Lack of Transportation (Medical): Not on file    Lack of Transportation (Non-Medical): Not on file   Physical Activity:     Days of Exercise per Week: Not on file    Minutes of Exercise per Session: Not on file   Stress:     Feeling of Stress : Not on file   Social Connections:     Frequency of Communication with Friends and Family: Not on file    Frequency of Social Gatherings with Friends and Family: Not on file    Attends Oriental orthodox Services: Not on file    Active Member of [address] [place] [address] [employer] or Organizations: Not on file    Attends Club or Organization Meetings: Not on file    Marital Status: Not on file   Intimate Partner Violence:     Fear of Current or Ex-Partner: Not on file    Emotionally Abused: Not on file    Physically Abused: Not on file    Sexually Abused: Not on file   Housing Stability:     Unable to Pay for Housing in the Last Year: Not on file    Number of [place] in the Last Year: Not on file    Unstable Housing in the Last Year: Not on file     Family History   Problem Relation Age of Onset    Stroke Mother     Cancer Sister     Diabetes Sister     Hypertension Sister     Dementia Brother      Current Outpatient Medications   Medication Sig Dispense Refill    lidocaine-prilocaine (EMLA) topical cream Apply to port 1/2 hour prior to needle insertion 30 g 5    prochlorperazine (COMPAZINE) 10 mg tablet Take 0.5 Tablets by mouth every six (6) hours as needed for Nausea or Vomiting for up to 180 days. Indications: nausea and vomiting caused by cancer drugs (Patient not taking: Reported on 6/28/2022) 40 Tablet 5    predniSONE (DELTASONE) 10 mg tablet Take 70 mg by mouth daily for 30 days. Indications: diffuse large B-cell lymphoma (Patient not taking: Reported on 6/28/2022) 35 Tablet 5    therapeutic multivitamin (THERAGRAN) tablet Take 1 Tablet by mouth daily.       losartan (COZAAR) 50 mg tablet Take 1 Tablet by mouth two (2) times a day. 30 Tablet 2    verapamiL (CALAN) 80 mg tablet Take 1 Tablet by mouth two (2) times a day. 30 Tablet 2    metFORMIN (GLUCOPHAGE) 850 mg tablet Take 1 Tablet by mouth two (2) times a day. 30 Tablet 2    OTHER Take 5 mg by mouth nightly. glibenclamida - diabetes from [place]      OTHER Take 2 mg by mouth nightly. tolterodina - bladder - from [place] (Patient not taking: Reported on 6/28/2022)      indomethacin (INDOCIN) 25 mg capsule Take 25 mg by mouth two (2) times a day. Facility-Administered Medications Ordered in Other Visits   Medication Dose Route Frequency Provider Last Rate Last Admin    0.9% sodium chloride infusion  20 mL/hr IntraVENous CONTINUOUS Nayana Wu MD 20 mL/hr at 04/25/22 0924 20 mL/hr at 04/25/22 0924    heparin (porcine) 100 unit/mL injection 500 Units  500 Units InterCATHeter PRN Christen Ace MD   500 Units at 04/25/22 1024       Review of Systems:    General :The patient has no complaints and there is no physical distress evident. Psychological : patient denies having any psychological symptoms such as hallucinations depression or anxiety. Ophthalmic:the patient denies having any visual impairment or eye discomfort. ENT: there are no abnormalities reported. Allergy and Immunology:the patient denies having any seasonal allergies or allergies to medications other than those already outlined above. Hematological and Lymphatic: the patient denies having any bruising, bleeding or lymphadenopathy. Endocrine: the patient denies having any heat or cold intolerance. There is no history of diabetes or thyroid disorders. Breast: the patient denies having any history of breast mass or lumps. Respiratory:the patient denies having any cough, shortness of breath, or dyspnea on exertion.      Cardiovascular: there are no complaints of chest pain, palpitations, chest pounding, or dyspnea on exertion. Gastrointestinal: the patient denies having nausea, emesis, diarrhea, constipation, or blood in the stool. Genito-Urinary: the patient denies having urinary urgency, frequency, or dysuria. Musculoskeletal: with the exception of mild arthralgias the patient has no other musculoskeletal complaints. Neurological:  denies having any numbness, tingling, or neurologic deficits. Dermatological: patient denies having any unexplained rash, skin ulcerations, or hives. Objective:     Visit Vitals  /86   Pulse 92   Temp 97.4 °F (36.3 °C)   Resp 17   Ht 4' 11\" (1.499 m)   Wt 74.3 kg (163 lb 12.8 oz)   SpO2 96%   BMI 33.08 kg/m²     Pain Score: 3    Physical Exam:     ECO    General: Well appearing, in NAD    Psychologic: mood and affect are appropriate, no anxiety or depression noted    Skin: examination of the skin reveals no bruising, rash or petechiae    HEENT: Normocephalic, atraumatic. Conjunctiva and sclera are clear. Pupils are equal, round and reactive to light. EOMs are intact. ENT without oral mucosal lesions, stomatitis or thrush    Neck: supple without lymphadenopathy, JVD or thyromegaly    Lymphatics: no palpable cervical, supraclavicular, infraclavicular, axillary or  Lymphadenopathy. Right proximal medial adenopathy is much smaller now 2x4 cm    Lungs: clear breath sounds bilaterally, no rhonchi or wheezes noted. Right Chest wall port good    Heart: Regular rate and rhythm, no murmurs, rubs or gallops, S1-S2 noted. Abdomen: soft, non-tender, non-distended, no HSM, positive bowel sounds    Extremities: without clubbing, cyanosis or edema    Neurologic: no focal deficits, steady gait, Alert and oriented x 3. Laboratory Data:     Results for orders placed or performed during the hospital encounter of 22   CBC WITH 3 PART DIFF     Status: Abnormal   Result Value Ref Range Status    WBC 5.4 4.5 - 13.0 K/uL Final    RBC 4.23 4. 10 - 5.10 M/uL Final    HGB 11.2 (L) 12.0 - 16.0 g/dL Final    HCT 36.3 36 - 48 % Final    MCV 85.8 78 - 102 FL Final    MCH 26.5 25.0 - 35.0 PG Final    MCHC 30.9 (L) 31 - 37 g/dL Final    RDW 13.0 11.5 - 14.5 % Final    PLATELET 264 012 - 180 K/uL Final    NEUTROPHILS 71 (H) 40 - 70 % Final    Mixed cells 14 0.1 - 17 % Final    LYMPHOCYTES 15 14 - 44 % Final    ABS. NEUTROPHILS 3.8 1.8 - 9.5 K/UL Final    ABS. MIXED CELLS 0.8 0.0 - 2.3 K/uL Final    ABS. LYMPHOCYTES 0.8 (L) 1.1 - 5.9 K/UL Final     Comment: Test performed at [address] [place] [address] or Outpatient Infusion Center Location. Reviewed by Medical Director. DF AUTOMATED   Final       Patient Active Problem List   Diagnosis Code    Lump of skin of right lower extremity R22.41    Primary osteoarthritis of both knees M17.0    Diabetes mellitus without complication ([address] J51.0    Helicobacter pylori antibody positive R76.8    DLBCL (diffuse large B cell lymphoma) ([address] C83.30         Assessment:     1. Diffuse large B-cell lymphoma of lymph nodes of lower extremity (HCC)    2. Primary osteoarthritis of both knees    3. Diabetes 1.5, managed as type 2 ([address]    4. Vitamin D deficiency        Plan:     1. Continue R mini CHOP for a total of at least 6 cycles, perhaps 8 cycles  2. Continue other meds  3. Follow with PCP especially for blood glucose management  4. Patient and daughter ate in agreement    Follow-up and Dispositions  ·   Return in about 4 weeks (around 8/4/2022) for Follow up with labs, Follow_up In Person.         Orders Placed This Encounter    CBC WITH AUTOMATED DIFF     Standing Status:   Future     Standing Expiration Date:   7/8/2023    VITAMIN D, 25 HYDROXY     Standing Status:   Future     Standing Expiration Date:   5/3/6206    METABOLIC PANEL, COMPREHENSIVE     Standing Status:   Future     Standing Expiration Date:   7/8/2023    URIC ACID     Standing Status:   Future     Standing Expiration Date:   7/8/2023    LD Standing Status:   Future     Standing Expiration Date:   7/8/2023       Unruly Heredia MD  7/7/2022

## 2022-07-08 RX ORDER — ACETAMINOPHEN 325 MG/1
650 TABLET ORAL ONCE
Status: CANCELLED
Start: 2022-07-18 | End: 2022-07-18

## 2022-07-08 RX ORDER — SODIUM CHLORIDE 9 MG/ML
100 INJECTION, SOLUTION INTRAVENOUS CONTINUOUS
Status: CANCELLED | OUTPATIENT
Start: 2022-07-18

## 2022-07-08 RX ORDER — DIPHENHYDRAMINE HYDROCHLORIDE 50 MG/ML
50 INJECTION, SOLUTION INTRAMUSCULAR; INTRAVENOUS AS NEEDED
Status: CANCELLED
Start: 2022-07-18

## 2022-07-08 RX ORDER — SODIUM CHLORIDE 9 MG/ML
10 INJECTION INTRAMUSCULAR; INTRAVENOUS; SUBCUTANEOUS AS NEEDED
Status: CANCELLED | OUTPATIENT
Start: 2022-07-18

## 2022-07-08 RX ORDER — SODIUM CHLORIDE 0.9 % (FLUSH) 0.9 %
10 SYRINGE (ML) INJECTION AS NEEDED
Status: CANCELLED | OUTPATIENT
Start: 2022-07-18

## 2022-07-08 RX ORDER — ONDANSETRON 2 MG/ML
8 INJECTION INTRAMUSCULAR; INTRAVENOUS AS NEEDED
Status: CANCELLED | OUTPATIENT
Start: 2022-07-18

## 2022-07-08 RX ORDER — EPINEPHRINE 1 MG/ML
0.3 INJECTION, SOLUTION, CONCENTRATE INTRAVENOUS AS NEEDED
Status: CANCELLED | OUTPATIENT
Start: 2022-07-18

## 2022-07-08 RX ORDER — ACETAMINOPHEN 325 MG/1
650 TABLET ORAL AS NEEDED
Status: CANCELLED
Start: 2022-07-18

## 2022-07-08 RX ORDER — ALBUTEROL SULFATE 0.83 MG/ML
2.5 SOLUTION RESPIRATORY (INHALATION) AS NEEDED
Status: CANCELLED
Start: 2022-07-18

## 2022-07-08 RX ORDER — PALONOSETRON 0.05 MG/ML
0.25 INJECTION, SOLUTION INTRAVENOUS ONCE
Status: CANCELLED | OUTPATIENT
Start: 2022-07-18 | End: 2022-07-18

## 2022-07-08 RX ORDER — SODIUM CHLORIDE 9 MG/ML
25 INJECTION, SOLUTION INTRAVENOUS CONTINUOUS
Status: CANCELLED | OUTPATIENT
Start: 2022-07-18

## 2022-07-08 RX ORDER — DIPHENHYDRAMINE HYDROCHLORIDE 50 MG/ML
50 INJECTION, SOLUTION INTRAMUSCULAR; INTRAVENOUS ONCE
Status: CANCELLED
Start: 2022-07-18 | End: 2022-07-18

## 2022-07-08 RX ORDER — DOXORUBICIN HYDROCHLORIDE 2 MG/ML
25 INJECTION, SOLUTION INTRAVENOUS ONCE
Status: CANCELLED
Start: 2022-07-18 | End: 2022-07-18

## 2022-07-08 RX ORDER — DIPHENHYDRAMINE HYDROCHLORIDE 50 MG/ML
25 INJECTION, SOLUTION INTRAMUSCULAR; INTRAVENOUS AS NEEDED
Status: CANCELLED
Start: 2022-07-18

## 2022-07-08 RX ORDER — HEPARIN 100 UNIT/ML
300-500 SYRINGE INTRAVENOUS AS NEEDED
Status: CANCELLED
Start: 2022-07-18

## 2022-07-08 RX ORDER — HYDROCORTISONE SODIUM SUCCINATE 100 MG/2ML
100 INJECTION, POWDER, FOR SOLUTION INTRAMUSCULAR; INTRAVENOUS AS NEEDED
Status: CANCELLED | OUTPATIENT
Start: 2022-07-18

## 2022-07-11 ENCOUNTER — NURSE NAVIGATOR (OUTPATIENT)
Dept: OTHER | Age: 72
End: 2022-07-11

## 2022-07-11 ENCOUNTER — TELEPHONE (OUTPATIENT)
Dept: ONCOLOGY | Age: 72
End: 2022-07-11

## 2022-07-11 NOTE — NURSE NAVIGATOR
F/u phone assessment regarding office visit with Dr. Jannie Langley on 7-7-22. Initial barriers assessment completed by daughter Whitney. Initial assessment for Kleber Fields, newly diagnosed with Diffuse Large B cell Lymphoma cancer. Phone meeting with pt included pt and daughter Whitney. Patient was assessed for the following barriers:    Communication:       Yes    No  -Ability to read/write,understand [place]       []      [x]    -Ability to talk with family/children,friends about diagnosis     [x]      []    -Other:  Comments/Referrals/Services provided: Pt is Vietnamese speaking only. Her daughter Jett Luke interprets for her. Employment/Financial/Legal:     Yes    No  -Loss of employment/income         []      [x]    -Insurance coverage          []      [x]     -Needs help applying for Social Security/Disability/FMLA     []      [x]     -Help with Co-Pays for office visits         []      [x]    -Medication assistance/medical supplies or equipment     []      [x]    -Difficulty paying bills: utilities/housing       []      [x]    -Means to buy food                     [x]      []    -Legal issues           []      [x]    -Other:  Comments/Referrals/Services provided: Pt has been approved for [employer] care. Psychosocial        Yes    No  Housing:  -Homeless           []      [x]    -Extended care needs: long term care/home care/Hospice     []      [x]    -Other:  Comments/Referrals/Services provided: She lives in a trailer with her granddaughter. Transportation:       Yes    No  -Lack of vehicle or public transportation options      []      [x]    -Funds need for public transportation: bus/taxi      []      [x]    -Other:  Comments/Referrals/Services provided: Her daughter brings her to all of her appts.   Support system:       Yes No  -Family members at home: spouse/significant other/children    [x]      []    -Has a support system         [x]      []    Other:  Comments/Referrals/Services provided: Kimberly daughter lives with her. Spirituality:        Yes No  -Spiritual issues          []      [x]    -Cultural concerns          []      [x]    -Other:  -Comments/Referrals/Services provided: She attends [employer] services. Sexuality:        Yes No  -Body image concerns                     []      [x]    -Relationships/significant other issues        []      [x]    -Other:  Comments/Referrals/Services provided: No concerns at this time. Coping:        Yes    No  -Able to manage emotions         []      [x]    -Feeling fearful or anxious         [x]      []    -Interest in attending support groups        []      [x]    -Cancer related pain/control         []      [x]    -Other:  Comments/Referrals/Services provided: Pt is interested in speaking with some from [facility] (). Tobacco dependency:      Yes No  -Currently smokes: cigarettes/cigars        []      [x]    -Uses smokeless tobacco         []      [x]    -Other:  Comments/Referrals/Services provided: Non smoker    Education/review of Disease process and Management   Yes No  -Explanation of navigator role/contact information      [x]      []    New Patient Guide for Lymphoma Cancer provided                      []     [x]         -Pathology/Staging          []      [x]    -Diagnostic tests          []      [x]    -Genetic testing needed         []      [x]    -Treatment options/plan: surgery/chemotherapy/radiation     [x]      []    -Mediport placement as needed                              []      [x]    -Tobacco cessation as needed        []      [x]    -Need for a second opinion         []      [x]    -Importance of bringing family/friends to medical appts     [x]      []   -Other:  Patient/family verbalized understanding of treatment plan: Yes. Pt referred to [facility] for [place] speaking resources. She is feeling tired and c/o headache taking Prednisone. Spoke with Chris Damon, Dr. Hasmukh Lua nurse and she will f/u with pt.        Kapil Mendenhall is diagnosed with lymphoma and was assessed by phonefor management of distress using the [facility] Distress Thermometer for Patients tool. Mild to moderate distress  Scorin-4        Yes    No    -Practical/physical issues       []      []      -Provide community resources      []      []      -Provide list of support groups      []      []      -Provide list of national organizations and websites              []      []      -Provide ongoing supportive care by oncology medical team        []      []                  Comments/Referrals/Services provided:  Yes. Moderate to severe distress  Scorin or greater                   Yes    No    -Navigator consulted with MD      [x]      []     -Navigator follow up         [x]      []     -Spiritual concerns        []      [x]     -Emotional/family concerns       [x]      []     -Refer to /mental health professional/PCP   [x]      []     Comments/Referrals/services provided: YES. Score: 5. Dr. Medellin Ok aware. Pt will be reassessed in the future. Has been referred to [facility] () for [place] speaking resources and services.

## 2022-07-12 ENCOUNTER — OFFICE VISIT (OUTPATIENT)
Dept: FAMILY MEDICINE CLINIC | Facility: CLINIC | Age: 72
End: 2022-07-12

## 2022-07-12 VITALS
WEIGHT: 164 LBS | BODY MASS INDEX: 33.06 KG/M2 | HEIGHT: 59 IN | RESPIRATION RATE: 20 BRPM | TEMPERATURE: 98.1 F | HEART RATE: 87 BPM | SYSTOLIC BLOOD PRESSURE: 127 MMHG | DIASTOLIC BLOOD PRESSURE: 80 MMHG | OXYGEN SATURATION: 98 %

## 2022-07-12 DIAGNOSIS — R31.9 HEMATURIA, UNSPECIFIED TYPE: ICD-10-CM

## 2022-07-12 DIAGNOSIS — E11.65 CONTROLLED TYPE 2 DIABETES MELLITUS WITH HYPERGLYCEMIA, WITHOUT LONG-TERM CURRENT USE OF INSULIN (HCC): Primary | ICD-10-CM

## 2022-07-12 LAB — HBA1C MFR BLD HPLC: 7.6 %

## 2022-07-12 PROCEDURE — 83036 HEMOGLOBIN GLYCOSYLATED A1C: CPT | Performed by: CLINICAL NURSE SPECIALIST

## 2022-07-12 PROCEDURE — 1123F ACP DISCUSS/DSCN MKR DOCD: CPT | Performed by: CLINICAL NURSE SPECIALIST

## 2022-07-12 PROCEDURE — 99213 OFFICE O/P EST LOW 20 MIN: CPT | Performed by: CLINICAL NURSE SPECIALIST

## 2022-07-12 PROCEDURE — 3044F HG A1C LEVEL LT 7.0%: CPT | Performed by: CLINICAL NURSE SPECIALIST

## 2022-07-12 RX ORDER — OXYBUTYNIN CHLORIDE 5 MG/1
2.5 TABLET ORAL 2 TIMES DAILY
Qty: 30 TABLET | Refills: 1 | Status: SHIPPED | OUTPATIENT
Start: 2022-07-12 | End: 2022-09-10

## 2022-07-12 RX ORDER — GLYBURIDE 1.25 MG/1
1.25 TABLET ORAL
Qty: 30 TABLET | Refills: 1 | Status: SHIPPED | OUTPATIENT
Start: 2022-07-12

## 2022-07-12 NOTE — PROGRESS NOTES
Discharge instructions reviewed with patient via  (daughter)     Medication list and understanding of medications reviewed with patient via  (daughter)  . OTC and herbal medications reviewed and added to med list if applicable  Barriers to adherence assessed. Guidance given regarding new medications this visit, including reason for taking this medicine, and common side effects. AVS given to patient. Explained to patient via  (daughter)  . Patient expressed Jayant  (daughter)  .

## 2022-07-12 NOTE — PATIENT INSTRUCTIONS
Oxibutinina (Por la boca)   Se [place] para tratar la vejiga hiperactiva. Marko(s) : Ditropan XL   Existen muchas otras marcas de Dueñas. Subha medicamento no debe ser usado cuando:   Subha medicamento no es adecuado para todas las personas. No lo use si alguna vez ha tenido matilde reacción alérgica a la oxibutinina. Forma de usar subha medicamento:   Líquido, Tableta, Tableta de liberación prolongada  · [place] thor medicamentos jeannie se le haya indicado. Es probable que sea necesario cambiar vuong dosis varias veces hasta encontrar la que funciona mejor para usted. · Suspensión oral: Mida el líquido oral con Rozena [place], [place] para uso oral o taza especialmente marcadas para medir medicamentos. · Trague la tableta de liberación prolongada entera. No triture, rompa o mastique. [employer] a la misma hora todos los días. · Si esta usando la tableta de liberación prolongada, parte de la tableta puede salir con thor evacuaciones intestinales. [place] es normal y no es motivo de preocupación. · Si olvida matilde dosis: Si olvida matilde dosis de vuong medicamento, tómelo lo más pronto posible. Si es fernando la hora para vuong próxima dosis, espere hasta entonces para papi vuong dosis regular. No use medicamento adicional para reponer la dosis olvidada. · Guarde el medicamento en un recipiente cerrado a temperatura ambiente y alejado del calor, la humedad y la kari directa. No congele la solución oral.  Medicamentos y alimentos que debe evitar:   Consulte con vuong médico o farmacéutico antes de usar cualquier medicamento, incluyendo los que compra sin receta médica, las vitaminas y los productos herbales. · Algunos alimentos y medicamentos pueden afectar la eficacia de la oxibutinina.  Informe a vuong médico si está usando cualquiera de los siguientes:  ¨ Metoclopramida  ¨ Medicamentos con bisfosfonato  ¨ Un medicamento para tratar matilde infección (incluyendo claritromicina, eritromicina, itraconazol, ketoconazol, miconazol)  · Informe a vuong médico si usted [place] cualquier cosa que le provoca sueño. Jacome Uyen son medicamentos para alergia o medicamentos narcóticos para el dolor y el alcohol. Precauciones marty el uso de conor medicamento:   · Informe a vuong médico si está embarazada o dando de lactar, o si usted padece de enfermedad del riñón, demencia, glaucoma, enfermedad cardíaca, problemas del ritmo cardíaco, hipertensión, miastenia gravis, enfermedad de Parkinson, agrandamiento de la próstata o dificultad para orinar o problemas estomacales o intestinales (incluyendo colitis, estreñimiento crónico, matilde obstrucción en los intestinos, o enfermedad por reflujo gastroesofágico). · Conor medicamento puede provocarle que se sienta mareado, con sueño o causar problemas con Dewaine Nordmann. No maneje un vehículo ni nanci ninguna tarea que pueda ser peligrosa hasta que usted sepa cómo lo afecta conor medicamento. · Conor medicamento puede disminuir la sudoración. Por lo tanto puede causar que vuong cuerpo se acalore demasiado. [place] las precauciones necesarias cuando nanci ejercicio vigoroso o esté al [employer] en un clima caliente. [place] suficientes líquidos para mantenerse hidratado. · Vuong médico tendrá que revisar vuong progreso y los efectos de conor medicamento marty thor citas regulares. Cumpla sin falta con todas thor citas médicas. Asista a todas thor citas. · Guarde todos los medicamentos fuera del alcance de los niños. Nunca comparta thor medicamentos con [facility].   Efectos secundarios que pueden presentarse marty el uso de conor medicamento:   Consulte inmediatamente con el médico si nota cualquiera de estos efectos secundarios:  · Reacción alérgica: Comezón o ronchas, hinchazón del aylin o las matteo, hinchazón u hormigueo en la boca o garganta, opresión en el pecho, dificultad para respirar  · Agitación, confusión o comportamiento inusual o mareos, christine, escuchar, o sentir cosas que no existen  · Cambios en la frecuencia o cantidad que [place] afua, dificultad o dolor al Velia Hones  · Piel caliente o seca al tacto, falta de sudor, debilidad  Consulte con el médico si nota los siguientes efectos secundarios menos graves:   · Estreñimiento, diarrea, náuseas, acidez estomacal, dolor o malestar estomacal  · La boca seca  Consulte con el médico si nota otros efectos secundarios que joseph son causados por conor medicamento. Llame a vuong médico para consultarle Noelle. Usted puede notificar thor efectos secundarios al FDA al [phone]-XGM-[phone]. © 2017 [facility] Information is for End User's use only and may not be sold, redistributed or otherwise used for commercial purposes. Esta información es sólo para uso en educación. Vuong intención no es darle un consejo médico sobre enfermedades o tratamientos. Colsulte con vuong [place] farmacéutico antes de seguir cualquier régimen médico para saber si es seguro y efectivo para usted.

## 2022-07-15 ENCOUNTER — HOSPITAL ENCOUNTER (OUTPATIENT)
Dept: INFUSION THERAPY | Age: 72
Discharge: HOME | End: 2022-07-15

## 2022-07-15 VITALS
DIASTOLIC BLOOD PRESSURE: 73 MMHG | RESPIRATION RATE: 16 BRPM | SYSTOLIC BLOOD PRESSURE: 147 MMHG | OXYGEN SATURATION: 98 % | HEART RATE: 78 BPM | TEMPERATURE: 98.2 F

## 2022-07-15 DIAGNOSIS — G44.021 INTRACTABLE CHRONIC CLUSTER HEADACHE: ICD-10-CM

## 2022-07-15 DIAGNOSIS — M17.0 PRIMARY OSTEOARTHRITIS OF BOTH KNEES: ICD-10-CM

## 2022-07-15 DIAGNOSIS — R12 PYROSIS: ICD-10-CM

## 2022-07-15 DIAGNOSIS — C83.35 DIFFUSE LARGE B-CELL LYMPHOMA OF LYMPH NODES OF LOWER EXTREMITY (HCC): Primary | ICD-10-CM

## 2022-07-15 LAB
25(OH)D3 SERPL-MCNC: 31.6 NG/ML (ref 30–100)
ALBUMIN SERPL-MCNC: 3.2 G/DL (ref 3.4–5)
ALBUMIN/GLOB SERPL: 0.9 {RATIO} (ref 0.8–1.7)
ALP SERPL-CCNC: 94 U/L (ref 45–117)
ALT SERPL-CCNC: 21 U/L (ref 13–56)
ANION GAP SERPL CALC-SCNC: 5 MMOL/L (ref 3–18)
AST SERPL-CCNC: 11 U/L (ref 10–38)
BASOPHILS # BLD: 0.1 K/UL (ref 0–0.1)
BASOPHILS NFR BLD: 1 % (ref 0–2)
BILIRUB SERPL-MCNC: 0.2 MG/DL (ref 0.2–1)
BUN SERPL-MCNC: 12 MG/DL (ref 7–18)
BUN/CREAT SERPL: 18 (ref 12–20)
CALCIUM SERPL-MCNC: 8.8 MG/DL (ref 8.5–10.1)
CHLORIDE SERPL-SCNC: 107 MMOL/L (ref 100–111)
CO2 SERPL-SCNC: 27 MMOL/L (ref 21–32)
CREAT SERPL-MCNC: 0.66 MG/DL (ref 0.6–1.3)
DIFFERENTIAL METHOD BLD: ABNORMAL
EOSINOPHIL # BLD: 0.2 K/UL (ref 0–0.4)
EOSINOPHIL NFR BLD: 3 % (ref 0–5)
ERYTHROCYTE [DISTWIDTH] IN BLOOD BY AUTOMATED COUNT: 15 % (ref 11.6–14.5)
GLOBULIN SER CALC-MCNC: 3.4 G/DL (ref 2–4)
GLUCOSE SERPL-MCNC: 152 MG/DL (ref 74–99)
HCT VFR BLD AUTO: 33.6 % (ref 35–45)
HGB BLD-MCNC: 10.6 G/DL (ref 12–16)
IMM GRANULOCYTES # BLD AUTO: 0 K/UL (ref 0–0.04)
IMM GRANULOCYTES NFR BLD AUTO: 1 % (ref 0–0.5)
LDH SERPL L TO P-CCNC: 136 U/L (ref 81–234)
LYMPHOCYTES # BLD: 0.8 K/UL (ref 0.9–3.6)
LYMPHOCYTES NFR BLD: 15 % (ref 21–52)
MCH RBC QN AUTO: 26.2 PG (ref 24–34)
MCHC RBC AUTO-ENTMCNC: 31.5 G/DL (ref 31–37)
MCV RBC AUTO: 83.2 FL (ref 78–100)
MONOCYTES # BLD: 0.6 K/UL (ref 0.05–1.2)
MONOCYTES NFR BLD: 12 % (ref 3–10)
NEUTS SEG # BLD: 3.3 K/UL (ref 1.8–8)
NEUTS SEG NFR BLD: 67 % (ref 40–73)
NRBC # BLD: 0 K/UL (ref 0–0.01)
NRBC BLD-RTO: 0 PER 100 WBC
PLATELET # BLD AUTO: 292 K/UL (ref 135–420)
PMV BLD AUTO: 9.7 FL (ref 9.2–11.8)
POTASSIUM SERPL-SCNC: 4.4 MMOL/L (ref 3.5–5.5)
PROT SERPL-MCNC: 6.6 G/DL (ref 6.4–8.2)
RBC # BLD AUTO: 4.04 M/UL (ref 4.2–5.3)
SODIUM SERPL-SCNC: 139 MMOL/L (ref 136–145)
URATE SERPL-MCNC: 3.8 MG/DL (ref 2.6–7.2)
WBC # BLD AUTO: 4.9 K/UL (ref 4.6–13.2)

## 2022-07-15 PROCEDURE — 83615 LACTATE (LD) (LDH) ENZYME: CPT

## 2022-07-15 PROCEDURE — 84550 ASSAY OF BLOOD/URIC ACID: CPT

## 2022-07-15 PROCEDURE — 36591 DRAW BLOOD OFF VENOUS DEVICE: CPT

## 2022-07-15 PROCEDURE — 74011250636 HC RX REV CODE- 250/636: Performed by: INTERNAL MEDICINE

## 2022-07-15 PROCEDURE — 77030012965 HC NDL HUBR BBMI -A

## 2022-07-15 PROCEDURE — 82306 VITAMIN D 25 HYDROXY: CPT

## 2022-07-15 PROCEDURE — 74011000250 HC RX REV CODE- 250: Performed by: INTERNAL MEDICINE

## 2022-07-15 PROCEDURE — 85025 COMPLETE CBC W/AUTO DIFF WBC: CPT

## 2022-07-15 PROCEDURE — 80053 COMPREHEN METABOLIC PANEL: CPT

## 2022-07-15 RX ORDER — HEPARIN 100 UNIT/ML
500 SYRINGE INTRAVENOUS ONCE
Status: COMPLETED | OUTPATIENT
Start: 2022-07-15 | End: 2022-07-15

## 2022-07-15 RX ORDER — OXYCODONE AND ACETAMINOPHEN 5; 325 MG/1; MG/1
1 TABLET ORAL
Qty: 40 TABLET | Refills: 0 | Status: SHIPPED | OUTPATIENT
Start: 2022-07-15 | End: 2022-07-22

## 2022-07-15 RX ORDER — SODIUM CHLORIDE 0.9 % (FLUSH) 0.9 %
5-10 SYRINGE (ML) INJECTION AS NEEDED
Status: DISCONTINUED | OUTPATIENT
Start: 2022-07-15 | End: 2022-07-17 | Stop reason: HOSPADM

## 2022-07-15 RX ORDER — OMEPRAZOLE 40 MG/1
40 CAPSULE, DELAYED RELEASE ORAL 2 TIMES DAILY
Qty: 180 CAPSULE | Refills: 3 | Status: SHIPPED | OUTPATIENT
Start: 2022-07-15 | End: 2022-08-15

## 2022-07-15 RX ADMIN — Medication 500 UNITS: at 09:05

## 2022-07-15 RX ADMIN — SODIUM CHLORIDE, PRESERVATIVE FREE 10 ML: 5 INJECTION INTRAVENOUS at 09:05

## 2022-07-15 NOTE — ADDENDUM NOTE
Encounter addended by: Adis Gutierrez RN on: 7/15/2022 9:34 AM   Actions taken: Clinical Note Signed

## 2022-07-15 NOTE — PROGRESS NOTES
[facility] Progress Note    Date: July 15, 2022    Name: Lazaro Mckinney              MRN: [id number]              : 1950    Pre-Chemo Labs      Ms. Grecia Cavazos was assessed and education was provided. Ms. Grecia Cavazos arrived ambulatory with her daughter at her side for pre-chemo labs. Interpretation services being provided. Ms. Grecia Cavazos denied any complaints today but stated she had experienced severe headaches after taking po prednisone as prescribed (5 days) with last treatment which did not respond to tylenol. Ms. Grecia Cavazos stated she also had reflux symptoms. Ms. Grecia Cavazos stated she forgot to discuss this with Dr. Nancy Gustafson at follow up appointment. Dr. Nancy Gustafson notified and script being called to pharmacy for [facility]. Patient and patient's daughter stated that in the past percocet caused dizziness and nausea. Ms. Grecia Cavazos' daughter's questions answered regarding safe use of percocet. Ms. Grecia Cavazos advised to use cold packs on head and rest as well during headaches. Ms. Grecia Cavazos and daughter verbalized understanding. Ms. Casa Leos vitals were reviewed. Visit Vitals  BP (!) 147/73 (BP 1 Location: Right arm, BP Patient Position: Sitting)   Pulse 78   Temp 98.2 °F (36.8 °C)   Resp 16   SpO2 98%   Breastfeeding No     Right chest medi-port accessed and blood drawn per orders, per protocol without complications. Port flushed and de-accessed per orders, per protocol without complications. Band aid applied to site    Ms. Grecia Cavazos tolerated the blood draw, and had no complaints at this time. Ms. Grecia Cavazos was discharged from [place] [address] in stable condition at [address] [place] [address]. She is to return on 22 at 0800 for her next appointment. Armband removed and shredded.       Corky Noel RN  July 15, 2022  9:31 AM

## 2022-07-18 ENCOUNTER — HOSPITAL ENCOUNTER (OUTPATIENT)
Dept: INFUSION THERAPY | Age: 72
Discharge: HOME | End: 2022-07-18

## 2022-07-18 VITALS
OXYGEN SATURATION: 99 % | RESPIRATION RATE: 16 BRPM | WEIGHT: 165 LBS | SYSTOLIC BLOOD PRESSURE: 155 MMHG | DIASTOLIC BLOOD PRESSURE: 76 MMHG | TEMPERATURE: 98.3 F | BODY MASS INDEX: 33.33 KG/M2 | HEART RATE: 64 BPM

## 2022-07-18 DIAGNOSIS — C83.30 DIFFUSE LARGE B-CELL LYMPHOMA, UNSPECIFIED BODY REGION (HCC): Primary | ICD-10-CM

## 2022-07-18 PROCEDURE — 96367 TX/PROPH/DG ADDL SEQ IV INF: CPT

## 2022-07-18 PROCEDURE — 77030012965 HC NDL HUBR BBMI -A

## 2022-07-18 PROCEDURE — 96409 CHEMO IV PUSH SNGL DRUG: CPT

## 2022-07-18 PROCEDURE — 74011250636 HC RX REV CODE- 250/636: Performed by: INTERNAL MEDICINE

## 2022-07-18 PROCEDURE — 96377 APPLICATON ON-BODY INJECTOR: CPT

## 2022-07-18 PROCEDURE — 74011000250 HC RX REV CODE- 250: Performed by: INTERNAL MEDICINE

## 2022-07-18 PROCEDURE — 96411 CHEMO IV PUSH ADDL DRUG: CPT

## 2022-07-18 PROCEDURE — 96413 CHEMO IV INFUSION 1 HR: CPT

## 2022-07-18 PROCEDURE — 74011250637 HC RX REV CODE- 250/637: Performed by: INTERNAL MEDICINE

## 2022-07-18 PROCEDURE — 74011000258 HC RX REV CODE- 258: Performed by: INTERNAL MEDICINE

## 2022-07-18 PROCEDURE — 96415 CHEMO IV INFUSION ADDL HR: CPT

## 2022-07-18 PROCEDURE — 96375 TX/PRO/DX INJ NEW DRUG ADDON: CPT

## 2022-07-18 PROCEDURE — 96417 CHEMO IV INFUS EACH ADDL SEQ: CPT

## 2022-07-18 RX ORDER — HEPARIN 100 UNIT/ML
300-500 SYRINGE INTRAVENOUS AS NEEDED
Status: DISPENSED | OUTPATIENT
Start: 2022-07-18 | End: 2022-07-18

## 2022-07-18 RX ORDER — SODIUM CHLORIDE 9 MG/ML
25 INJECTION, SOLUTION INTRAVENOUS CONTINUOUS
Status: DISPENSED | OUTPATIENT
Start: 2022-07-18 | End: 2022-07-18

## 2022-07-18 RX ORDER — SODIUM CHLORIDE 9 MG/ML
100 INJECTION, SOLUTION INTRAVENOUS CONTINUOUS
Status: DISPENSED | OUTPATIENT
Start: 2022-07-18 | End: 2022-07-18

## 2022-07-18 RX ORDER — DIPHENHYDRAMINE HYDROCHLORIDE 50 MG/ML
50 INJECTION, SOLUTION INTRAMUSCULAR; INTRAVENOUS ONCE
Status: COMPLETED | OUTPATIENT
Start: 2022-07-18 | End: 2022-07-18

## 2022-07-18 RX ORDER — DOXORUBICIN HYDROCHLORIDE 2 MG/ML
25 INJECTION, SOLUTION INTRAVENOUS ONCE
Status: COMPLETED | OUTPATIENT
Start: 2022-07-18 | End: 2022-07-18

## 2022-07-18 RX ORDER — PALONOSETRON 0.05 MG/ML
0.25 INJECTION, SOLUTION INTRAVENOUS ONCE
Status: COMPLETED | OUTPATIENT
Start: 2022-07-18 | End: 2022-07-18

## 2022-07-18 RX ORDER — ACETAMINOPHEN 325 MG/1
650 TABLET ORAL ONCE
Status: COMPLETED | OUTPATIENT
Start: 2022-07-18 | End: 2022-07-18

## 2022-07-18 RX ORDER — SODIUM CHLORIDE 0.9 % (FLUSH) 0.9 %
10 SYRINGE (ML) INJECTION AS NEEDED
Status: DISPENSED | OUTPATIENT
Start: 2022-07-18 | End: 2022-07-18

## 2022-07-18 RX ADMIN — DIPHENHYDRAMINE HYDROCHLORIDE 50 MG: 50 INJECTION, SOLUTION INTRAMUSCULAR; INTRAVENOUS at 08:50

## 2022-07-18 RX ADMIN — PEGFILGRASTIM 6 MG: KIT SUBCUTANEOUS at 14:54

## 2022-07-18 RX ADMIN — Medication 10 ML: at 15:22

## 2022-07-18 RX ADMIN — SODIUM CHLORIDE 25 ML/HR: 0.9 INJECTION, SOLUTION INTRAVENOUS at 09:37

## 2022-07-18 RX ADMIN — ACETAMINOPHEN 650 MG: 325 TABLET ORAL at 08:37

## 2022-07-18 RX ADMIN — CYCLOPHOSPHAMIDE 700 MG: 500 INJECTION INTRAVENOUS at 12:56

## 2022-07-18 RX ADMIN — PALONOSETRON 0.25 MG: 0.25 INJECTION, SOLUTION INTRAVENOUS at 12:41

## 2022-07-18 RX ADMIN — DOXORUBICIN HYDROCHLORIDE 44 MG: 2 INJECTION, SOLUTION INTRAVENOUS at 14:35

## 2022-07-18 RX ADMIN — SODIUM CHLORIDE 500 ML: 0.9 INJECTION, SOLUTION INTRAVENOUS at 08:30

## 2022-07-18 RX ADMIN — SODIUM CHLORIDE 100 ML/HR: 0.9 INJECTION, SOLUTION INTRAVENOUS at 09:38

## 2022-07-18 RX ADMIN — SODIUM CHLORIDE 660 MG: 9 INJECTION, SOLUTION INTRAVENOUS at 09:32

## 2022-07-18 RX ADMIN — VINCRISTINE SULFATE 1 MG: 1 INJECTION, SOLUTION INTRAVENOUS at 13:51

## 2022-07-18 RX ADMIN — HEPARIN 500 UNITS: 100 SYRINGE at 15:22

## 2022-07-18 RX ADMIN — FOSAPREPITANT 150 MG: 150 INJECTION, POWDER, LYOPHILIZED, FOR SOLUTION INTRAVENOUS at 12:09

## 2022-07-22 RX ORDER — SODIUM CHLORIDE 9 MG/ML
25 INJECTION, SOLUTION INTRAVENOUS CONTINUOUS
Status: CANCELLED | OUTPATIENT
Start: 2022-08-08

## 2022-07-22 RX ORDER — SODIUM CHLORIDE 9 MG/ML
100 INJECTION, SOLUTION INTRAVENOUS CONTINUOUS
Status: CANCELLED | OUTPATIENT
Start: 2022-08-08

## 2022-07-22 RX ORDER — SODIUM CHLORIDE 0.9 % (FLUSH) 0.9 %
10 SYRINGE (ML) INJECTION AS NEEDED
Status: CANCELLED | OUTPATIENT
Start: 2022-08-08

## 2022-07-22 RX ORDER — ACETAMINOPHEN 325 MG/1
650 TABLET ORAL AS NEEDED
Status: CANCELLED
Start: 2022-08-08

## 2022-07-22 RX ORDER — DIPHENHYDRAMINE HYDROCHLORIDE 50 MG/ML
25 INJECTION, SOLUTION INTRAMUSCULAR; INTRAVENOUS AS NEEDED
Status: CANCELLED
Start: 2022-08-08

## 2022-07-22 RX ORDER — SODIUM CHLORIDE 9 MG/ML
10 INJECTION INTRAMUSCULAR; INTRAVENOUS; SUBCUTANEOUS AS NEEDED
Status: CANCELLED | OUTPATIENT
Start: 2022-08-08

## 2022-07-22 RX ORDER — HYDROCORTISONE SODIUM SUCCINATE 100 MG/2ML
100 INJECTION, POWDER, FOR SOLUTION INTRAMUSCULAR; INTRAVENOUS AS NEEDED
Status: CANCELLED | OUTPATIENT
Start: 2022-08-08

## 2022-07-22 RX ORDER — EPINEPHRINE 1 MG/ML
0.3 INJECTION, SOLUTION, CONCENTRATE INTRAVENOUS AS NEEDED
Status: CANCELLED | OUTPATIENT
Start: 2022-08-08

## 2022-07-22 RX ORDER — DIPHENHYDRAMINE HYDROCHLORIDE 50 MG/ML
50 INJECTION, SOLUTION INTRAMUSCULAR; INTRAVENOUS AS NEEDED
Status: CANCELLED
Start: 2022-08-08

## 2022-07-22 RX ORDER — PALONOSETRON 0.05 MG/ML
0.25 INJECTION, SOLUTION INTRAVENOUS ONCE
Status: CANCELLED | OUTPATIENT
Start: 2022-08-08 | End: 2022-08-08

## 2022-07-22 RX ORDER — ACETAMINOPHEN 325 MG/1
650 TABLET ORAL ONCE
Status: CANCELLED
Start: 2022-08-08 | End: 2022-08-08

## 2022-07-22 RX ORDER — DIPHENHYDRAMINE HYDROCHLORIDE 50 MG/ML
50 INJECTION, SOLUTION INTRAMUSCULAR; INTRAVENOUS ONCE
Status: CANCELLED
Start: 2022-08-08 | End: 2022-08-08

## 2022-07-22 RX ORDER — ALBUTEROL SULFATE 0.83 MG/ML
2.5 SOLUTION RESPIRATORY (INHALATION) AS NEEDED
Status: CANCELLED
Start: 2022-08-08

## 2022-07-22 RX ORDER — ONDANSETRON 2 MG/ML
8 INJECTION INTRAMUSCULAR; INTRAVENOUS AS NEEDED
Status: CANCELLED | OUTPATIENT
Start: 2022-08-08

## 2022-07-22 RX ORDER — HEPARIN 100 UNIT/ML
300-500 SYRINGE INTRAVENOUS AS NEEDED
Status: CANCELLED
Start: 2022-08-08

## 2022-07-22 RX ORDER — DOXORUBICIN HYDROCHLORIDE 2 MG/ML
25 INJECTION, SOLUTION INTRAVENOUS ONCE
Status: CANCELLED
Start: 2022-08-08 | End: 2022-08-08

## 2022-07-26 ENCOUNTER — VIRTUAL VISIT (OUTPATIENT)
Dept: FAMILY MEDICINE CLINIC | Facility: CLINIC | Age: 72
End: 2022-07-26

## 2022-07-26 ENCOUNTER — TELEPHONE (OUTPATIENT)
Dept: FAMILY MEDICINE CLINIC | Facility: CLINIC | Age: 72
End: 2022-07-26

## 2022-07-26 DIAGNOSIS — E11.65 CONTROLLED TYPE 2 DIABETES MELLITUS WITH HYPERGLYCEMIA, WITHOUT LONG-TERM CURRENT USE OF INSULIN (HCC): Primary | ICD-10-CM

## 2022-07-26 DIAGNOSIS — M25.562 CHRONIC PAIN OF BOTH KNEES: ICD-10-CM

## 2022-07-26 DIAGNOSIS — G89.29 CHRONIC PAIN OF BOTH KNEES: ICD-10-CM

## 2022-07-26 DIAGNOSIS — M25.561 CHRONIC PAIN OF BOTH KNEES: ICD-10-CM

## 2022-07-26 PROCEDURE — 99422 OL DIG E/M SVC 11-20 MIN: CPT | Performed by: CLINICAL NURSE SPECIALIST

## 2022-07-26 NOTE — PROGRESS NOTES
Leopold Commodore (: 1950) is a 74 y.o. female, established patient, here for evaluation of the following chief complaint(s):   Diabetes       ASSESSMENT/PLAN:  Below is the assessment and plan developed based on review of pertinent history, labs, studies, and medications. 1. Controlled type 2 diabetes mellitus with hyperglycemia, without long-term current use of insulin (HCC)    Previously mentioned the possibility of implementing basal insulin. Will discuss further management of DM while receiving chemo with Dr. Amparo Woody. Patient to see Dr. Apmaro Woody later this week for bilateral knee injections. Encouraged to continue to monitor her BS, aware of s/s that require ER care. Instructed to call with any questions or concerns. No follow-ups on file. SUBJECTIVE/OBJECTIVE:  Patient was recently seen in clinic due to hyperglycemia r/t chemotherapy. Glyburide 1.25 mg was added to DM regimen. States that her blood sugars have still been elevated, 150s - 200 fasting. Most recent A1c was 7.6, prior to starting chemo was 6.7. Also, having bilateral knee discomfort again r/t OA, would like repeat knee injections. No other concerns at this time. Review of Systems   Constitutional: Negative. Respiratory: Negative. Cardiovascular: Negative. Endocrine: Negative. Musculoskeletal:  Positive for arthralgias. No data recorded     Physical Exam  Neurological:      Mental Status: She is alert and oriented to person, place, and time. On this date 2022 I have spent 15 minutes reviewing previous notes, test results and face to face (virtual) with the patient discussing the diagnosis and importance of compliance with the treatment plan as well as documenting on the day of the visit. Leopold Commodore, was evaluated through a synchronous (real-time) audio encounter. The patient (or guardian if applicable) is aware that this is a billable service, which includes applicable co-pays. This Virtual Visit was conducted with patient's (and/or legal guardian's) consent. The visit was conducted pursuant to the emergency declaration under the [facility] [address] authority and the latakoo Act. Patient identification was verified, and a caregiver was present when appropriate. The patient was located at: Home: [place] [address]  The provider was located at: Facility (Appt Department): [address] [place] [address]       An electronic signature was used to authenticate this note.   -- Perri Apley, NP

## 2022-08-01 ENCOUNTER — HOSPITAL ENCOUNTER (OUTPATIENT)
Dept: INFUSION THERAPY | Age: 72
End: 2022-08-01

## 2022-08-01 DIAGNOSIS — E55.9 VITAMIN D DEFICIENCY: ICD-10-CM

## 2022-08-01 DIAGNOSIS — M17.0 PRIMARY OSTEOARTHRITIS OF BOTH KNEES: ICD-10-CM

## 2022-08-01 DIAGNOSIS — C83.35 DIFFUSE LARGE B-CELL LYMPHOMA OF LYMPH NODES OF LOWER EXTREMITY (HCC): ICD-10-CM

## 2022-08-01 DIAGNOSIS — E13.9 DIABETES 1.5, MANAGED AS TYPE 2 (HCC): ICD-10-CM

## 2022-08-05 ENCOUNTER — HOSPITAL ENCOUNTER (OUTPATIENT)
Dept: INFUSION THERAPY | Age: 72
Discharge: HOME | End: 2022-08-05

## 2022-08-05 VITALS
BODY MASS INDEX: 33.34 KG/M2 | RESPIRATION RATE: 16 BRPM | HEIGHT: 59 IN | DIASTOLIC BLOOD PRESSURE: 79 MMHG | HEART RATE: 68 BPM | WEIGHT: 165.4 LBS | OXYGEN SATURATION: 98 % | SYSTOLIC BLOOD PRESSURE: 160 MMHG | TEMPERATURE: 98 F

## 2022-08-05 LAB
ALBUMIN SERPL-MCNC: 3.1 G/DL (ref 3.4–5)
ALBUMIN/GLOB SERPL: 0.9 {RATIO} (ref 0.8–1.7)
ALP SERPL-CCNC: 94 U/L (ref 45–117)
ALT SERPL-CCNC: 22 U/L (ref 13–56)
ANION GAP SERPL CALC-SCNC: 7 MMOL/L (ref 3–18)
AST SERPL-CCNC: 13 U/L (ref 10–38)
BASOPHILS # BLD: 0.1 K/UL (ref 0–0.1)
BASOPHILS NFR BLD: 1 % (ref 0–2)
BILIRUB SERPL-MCNC: 0.3 MG/DL (ref 0.2–1)
BUN SERPL-MCNC: 11 MG/DL (ref 7–18)
BUN/CREAT SERPL: 17 (ref 12–20)
CALCIUM SERPL-MCNC: 9.2 MG/DL (ref 8.5–10.1)
CHLORIDE SERPL-SCNC: 105 MMOL/L (ref 100–111)
CO2 SERPL-SCNC: 26 MMOL/L (ref 21–32)
CREAT SERPL-MCNC: 0.64 MG/DL (ref 0.6–1.3)
DIFFERENTIAL METHOD BLD: ABNORMAL
EOSINOPHIL # BLD: 0.2 K/UL (ref 0–0.4)
EOSINOPHIL NFR BLD: 3 % (ref 0–5)
ERYTHROCYTE [DISTWIDTH] IN BLOOD BY AUTOMATED COUNT: 15.9 % (ref 11.6–14.5)
GLOBULIN SER CALC-MCNC: 3.4 G/DL (ref 2–4)
GLUCOSE SERPL-MCNC: 138 MG/DL (ref 74–99)
HCT VFR BLD AUTO: 33.6 % (ref 35–45)
HGB BLD-MCNC: 10.5 G/DL (ref 12–16)
IMM GRANULOCYTES # BLD AUTO: 0 K/UL (ref 0–0.04)
IMM GRANULOCYTES NFR BLD AUTO: 1 % (ref 0–0.5)
LYMPHOCYTES # BLD: 0.7 K/UL (ref 0.9–3.6)
LYMPHOCYTES NFR BLD: 14 % (ref 21–52)
MCH RBC QN AUTO: 26.5 PG (ref 24–34)
MCHC RBC AUTO-ENTMCNC: 31.3 G/DL (ref 31–37)
MCV RBC AUTO: 84.8 FL (ref 78–100)
MONOCYTES # BLD: 0.8 K/UL (ref 0.05–1.2)
MONOCYTES NFR BLD: 14 % (ref 3–10)
NEUTS SEG # BLD: 3.5 K/UL (ref 1.8–8)
NEUTS SEG NFR BLD: 67 % (ref 40–73)
NRBC # BLD: 0 K/UL (ref 0–0.01)
NRBC BLD-RTO: 0 PER 100 WBC
PLATELET # BLD AUTO: 327 K/UL (ref 135–420)
PMV BLD AUTO: 9.4 FL (ref 9.2–11.8)
POTASSIUM SERPL-SCNC: 4 MMOL/L (ref 3.5–5.5)
PROT SERPL-MCNC: 6.5 G/DL (ref 6.4–8.2)
RBC # BLD AUTO: 3.96 M/UL (ref 4.2–5.3)
SODIUM SERPL-SCNC: 138 MMOL/L (ref 136–145)
WBC # BLD AUTO: 5.2 K/UL (ref 4.6–13.2)

## 2022-08-05 PROCEDURE — 77030012965 HC NDL HUBR BBMI -A

## 2022-08-05 PROCEDURE — 36591 DRAW BLOOD OFF VENOUS DEVICE: CPT

## 2022-08-05 PROCEDURE — 85025 COMPLETE CBC W/AUTO DIFF WBC: CPT

## 2022-08-05 PROCEDURE — 74011250636 HC RX REV CODE- 250/636: Performed by: INTERNAL MEDICINE

## 2022-08-05 PROCEDURE — 74011000250 HC RX REV CODE- 250: Performed by: INTERNAL MEDICINE

## 2022-08-05 PROCEDURE — 80053 COMPREHEN METABOLIC PANEL: CPT

## 2022-08-05 RX ORDER — HEPARIN 100 UNIT/ML
500 SYRINGE INTRAVENOUS ONCE
Status: COMPLETED | OUTPATIENT
Start: 2022-08-05 | End: 2022-08-05

## 2022-08-05 RX ORDER — SODIUM CHLORIDE 0.9 % (FLUSH) 0.9 %
5-10 SYRINGE (ML) INJECTION AS NEEDED
Status: DISCONTINUED | OUTPATIENT
Start: 2022-08-05 | End: 2022-08-07 | Stop reason: HOSPADM

## 2022-08-05 RX ADMIN — Medication 500 UNITS: at 08:25

## 2022-08-05 RX ADMIN — SODIUM CHLORIDE, PRESERVATIVE FREE 10 ML: 5 INJECTION INTRAVENOUS at 08:25

## 2022-08-08 ENCOUNTER — HOSPITAL ENCOUNTER (OUTPATIENT)
Dept: INFUSION THERAPY | Age: 72
Discharge: HOME | End: 2022-08-08

## 2022-08-08 VITALS
SYSTOLIC BLOOD PRESSURE: 158 MMHG | HEART RATE: 67 BPM | RESPIRATION RATE: 16 BRPM | TEMPERATURE: 98.4 F | DIASTOLIC BLOOD PRESSURE: 80 MMHG | OXYGEN SATURATION: 97 %

## 2022-08-08 DIAGNOSIS — C83.30 DIFFUSE LARGE B-CELL LYMPHOMA, UNSPECIFIED BODY REGION (HCC): Primary | ICD-10-CM

## 2022-08-08 DIAGNOSIS — R22.41 LUMP OF SKIN OF RIGHT LOWER EXTREMITY: ICD-10-CM

## 2022-08-08 PROCEDURE — 96377 APPLICATON ON-BODY INJECTOR: CPT

## 2022-08-08 PROCEDURE — 96415 CHEMO IV INFUSION ADDL HR: CPT

## 2022-08-08 PROCEDURE — 96413 CHEMO IV INFUSION 1 HR: CPT

## 2022-08-08 PROCEDURE — 96367 TX/PROPH/DG ADDL SEQ IV INF: CPT

## 2022-08-08 PROCEDURE — 96409 CHEMO IV PUSH SNGL DRUG: CPT

## 2022-08-08 PROCEDURE — 74011250637 HC RX REV CODE- 250/637: Performed by: INTERNAL MEDICINE

## 2022-08-08 PROCEDURE — 74011250636 HC RX REV CODE- 250/636: Performed by: INTERNAL MEDICINE

## 2022-08-08 PROCEDURE — 96375 TX/PRO/DX INJ NEW DRUG ADDON: CPT

## 2022-08-08 PROCEDURE — 96411 CHEMO IV PUSH ADDL DRUG: CPT

## 2022-08-08 PROCEDURE — 74011000258 HC RX REV CODE- 258: Performed by: INTERNAL MEDICINE

## 2022-08-08 PROCEDURE — 77030012965 HC NDL HUBR BBMI -A

## 2022-08-08 PROCEDURE — 74011000250 HC RX REV CODE- 250: Performed by: INTERNAL MEDICINE

## 2022-08-08 RX ORDER — DIPHENHYDRAMINE HYDROCHLORIDE 50 MG/ML
50 INJECTION, SOLUTION INTRAMUSCULAR; INTRAVENOUS ONCE
Status: COMPLETED | OUTPATIENT
Start: 2022-08-08 | End: 2022-08-08

## 2022-08-08 RX ORDER — ACETAMINOPHEN 325 MG/1
650 TABLET ORAL ONCE
Status: COMPLETED | OUTPATIENT
Start: 2022-08-08 | End: 2022-08-08

## 2022-08-08 RX ORDER — SODIUM CHLORIDE 9 MG/ML
100 INJECTION, SOLUTION INTRAVENOUS CONTINUOUS
Status: DISPENSED | OUTPATIENT
Start: 2022-08-08 | End: 2022-08-08

## 2022-08-08 RX ORDER — SODIUM CHLORIDE 0.9 % (FLUSH) 0.9 %
10 SYRINGE (ML) INJECTION AS NEEDED
Status: DISPENSED | OUTPATIENT
Start: 2022-08-08 | End: 2022-08-08

## 2022-08-08 RX ORDER — PALONOSETRON 0.05 MG/ML
0.25 INJECTION, SOLUTION INTRAVENOUS ONCE
Status: COMPLETED | OUTPATIENT
Start: 2022-08-08 | End: 2022-08-08

## 2022-08-08 RX ORDER — SODIUM CHLORIDE 9 MG/ML
25 INJECTION, SOLUTION INTRAVENOUS CONTINUOUS
Status: DISPENSED | OUTPATIENT
Start: 2022-08-08 | End: 2022-08-08

## 2022-08-08 RX ORDER — HEPARIN 100 UNIT/ML
300-500 SYRINGE INTRAVENOUS AS NEEDED
Status: DISPENSED | OUTPATIENT
Start: 2022-08-08 | End: 2022-08-08

## 2022-08-08 RX ORDER — DOXORUBICIN HYDROCHLORIDE 2 MG/ML
25 INJECTION, SOLUTION INTRAVENOUS ONCE
Status: COMPLETED | OUTPATIENT
Start: 2022-08-08 | End: 2022-08-08

## 2022-08-08 RX ADMIN — SODIUM CHLORIDE 25 ML/HR: 0.9 INJECTION, SOLUTION INTRAVENOUS at 08:44

## 2022-08-08 RX ADMIN — Medication 10 ML: at 14:45

## 2022-08-08 RX ADMIN — SODIUM CHLORIDE 100 ML/HR: 0.9 INJECTION, SOLUTION INTRAVENOUS at 08:43

## 2022-08-08 RX ADMIN — DOXORUBICIN HYDROCHLORIDE 44 MG: 2 INJECTION, SOLUTION INTRAVENOUS at 13:47

## 2022-08-08 RX ADMIN — VINCRISTINE SULFATE 1 MG: 1 INJECTION, SOLUTION INTRAVENOUS at 14:05

## 2022-08-08 RX ADMIN — PEGFILGRASTIM 6 MG: KIT SUBCUTANEOUS at 14:45

## 2022-08-08 RX ADMIN — ACETAMINOPHEN 650 MG: 325 TABLET ORAL at 08:31

## 2022-08-08 RX ADMIN — CYCLOPHOSPHAMIDE 700 MG: 1 INJECTION INTRAVENOUS at 12:32

## 2022-08-08 RX ADMIN — SODIUM CHLORIDE 500 ML: 0.9 INJECTION, SOLUTION INTRAVENOUS at 08:10

## 2022-08-08 RX ADMIN — HEPARIN 500 UNITS: 100 SYRINGE at 14:45

## 2022-08-08 RX ADMIN — DIPHENHYDRAMINE HYDROCHLORIDE 50 MG: 50 INJECTION, SOLUTION INTRAMUSCULAR; INTRAVENOUS at 08:44

## 2022-08-08 RX ADMIN — SODIUM CHLORIDE 660 MG: 0.9 INJECTION, SOLUTION INTRAVENOUS at 09:11

## 2022-08-08 RX ADMIN — FOSAPREPITANT 150 MG: 150 INJECTION, POWDER, LYOPHILIZED, FOR SOLUTION INTRAVENOUS at 11:48

## 2022-08-08 RX ADMIN — PALONOSETRON 0.25 MG: 0.25 INJECTION, SOLUTION INTRAVENOUS at 11:52

## 2022-08-08 NOTE — PROGRESS NOTES
[facility] Progress Note    Date: 2022    Name: Hafsa Christian              MRN: [id number]              : 1950    Chemotherapy Cycle: C3D1      R-chop (Rituxan,Cytoxan, Adriamycin, Vincristine)     Prednisone po (Home days 1-5)    Ms. Mykel Bravo was assessed and education was provided. Ms. Mykel Bravo arrived ambulatory using cane accompanied by her daughter. Translation services being used via ipad. Treatment education reviewed with Ms. Mykel Bravo and daughter. Pt reports headache and fatigue after previous treatment, adequate hydration and nutrition encouraged. Ms. Radha Del Angel vitals were reviewed. Visit Vitals  BP (!) 162/88   Pulse 72   Temp 98.4 °F (36.9 °C)   Resp 16   SpO2 98%       Lab results were obtained and reviewed dated 22. EF 60-65 % on echo dated 22. Mediport accessed with 20 gauge 0.75 inch needle without complications. Port flushes without resistance and positive brisk blood return noted. Paper tape and gauze applied as dressing, pt reports \"blistering and scabbing\" from tegaderm dressing. Treatment initiated per orders. NS  ml bolus initiated and completed. NS infusing IV 100ml/hr. Pre-medications (Tylenol 650 mg po, Benadryl 50 mg IV) were administered as ordered. Rituxan 660 mg was initiated at 100 mg/hr (50 ml/hr) for 30 minutes, increased by 50ml every 30 minutes to max rate of  200 ml/hr (400mg/hr) per orders without complications. Pt tolerates well, is resting comfortably, reports a \"little headache\". VS remain stable. Port flushed and blood return reverified. Pre-medications (Emend 150 mg IV, Aloxi 0.25 mg IV) were administered as ordered and after 30 minutes next chemo initiated per orders. Cytoxan 700 mg IV was infused at 557 ml/hr without complications or reaction. Port flushed and blood return reverified. Adriamycin 44 mg IV push over 10 minutes per orders without complications or s/s of reaction.        Port flushed and blood return reverified. Vincristine 1 mg IV was infused @ 300 ml/hr per orders without complications or s/s of reaction. 1422 Neulasta 6 mg on-body injector applied to patient's abdomen. Instructions reviewed with patient and daughter on monitoring and when to remove device. Patient and daughter verbalized understanding. Port flushed and de-accessed per orders, per protocol without complications. Band aid applied to site. Ms. Carmella Turcios tolerated infusions, and had no complaints at this time. Ms. Carmella Turcios was discharged from [place] [address] in stable condition at . She is to return on 8/15/22 for her next appointment.     Jules Tracey RN  August 8, 2022  11:13 AM

## 2022-08-15 ENCOUNTER — OFFICE VISIT (OUTPATIENT)
Age: 72
End: 2022-08-15

## 2022-08-15 ENCOUNTER — NURSE NAVIGATOR (OUTPATIENT)
Dept: OTHER | Age: 72
End: 2022-08-15

## 2022-08-15 VITALS
HEART RATE: 90 BPM | BODY MASS INDEX: 34.07 KG/M2 | WEIGHT: 169 LBS | OXYGEN SATURATION: 97 % | TEMPERATURE: 98 F | RESPIRATION RATE: 16 BRPM | HEIGHT: 59 IN | SYSTOLIC BLOOD PRESSURE: 172 MMHG | DIASTOLIC BLOOD PRESSURE: 94 MMHG

## 2022-08-15 DIAGNOSIS — K27.9 PUD (PEPTIC ULCER DISEASE): ICD-10-CM

## 2022-08-15 DIAGNOSIS — E53.8 VITAMIN B 12 DEFICIENCY: ICD-10-CM

## 2022-08-15 DIAGNOSIS — R12 PYROSIS: Primary | ICD-10-CM

## 2022-08-15 DIAGNOSIS — E55.9 VITAMIN D DEFICIENCY: ICD-10-CM

## 2022-08-15 DIAGNOSIS — D50.8 OTHER IRON DEFICIENCY ANEMIA: ICD-10-CM

## 2022-08-15 DIAGNOSIS — E13.9 DIABETES 1.5, MANAGED AS TYPE 2 (HCC): ICD-10-CM

## 2022-08-15 DIAGNOSIS — M17.0 PRIMARY OSTEOARTHRITIS OF BOTH KNEES: ICD-10-CM

## 2022-08-15 DIAGNOSIS — C83.35 DIFFUSE LARGE B-CELL LYMPHOMA OF LYMPH NODES OF LOWER EXTREMITY (HCC): ICD-10-CM

## 2022-08-15 PROCEDURE — 99214 OFFICE O/P EST MOD 30 MIN: CPT | Performed by: INTERNAL MEDICINE

## 2022-08-15 PROCEDURE — 1123F ACP DISCUSS/DSCN MKR DOCD: CPT | Performed by: INTERNAL MEDICINE

## 2022-08-15 PROCEDURE — 3051F HG A1C>EQUAL 7.0%<8.0%: CPT | Performed by: INTERNAL MEDICINE

## 2022-08-15 RX ORDER — OMEPRAZOLE 40 MG/1
40 CAPSULE, DELAYED RELEASE ORAL 2 TIMES DAILY
Qty: 180 CAPSULE | Refills: 3 | Status: SHIPPED | OUTPATIENT
Start: 2022-08-15 | End: 2023-08-10

## 2022-08-15 NOTE — LETTER
8/15/2022    Patient: Miguel Morrison   YOB: 1950   Date of Visit: 8/15/2022     Katina Guaman NP  [address] [place] [address]  Via [facility] [address]    Dear Katina Guaman NP,      Thank you for referring Ms. Miguel Morrison to [facility] for evaluation. My notes for this consultation are attached. If you have questions, please do not hesitate to call me. I look forward to following your patient along with you.       Sincerely,    Giuliana Donaldson MD

## 2022-08-15 NOTE — NURSE NAVIGATOR
Saw pt in clinic for scheduled follow up visit with Dr. Brigitte Bhagat. Pt here with her daughter. Doing well except for blood pressure and blood sugars during treatment. Her next chemotherapy treatment (#4) is scheduled for 8-29-22. Follow up with Dr. Brigitte Bhagat in four weeks. All questions answered.

## 2022-08-15 NOTE — PROGRESS NOTES
Hematology/Oncology  Progress Note    Name: Wyatt Garcia  Date: 8/15/2022  : 1950  Primary Care Provider: Ruiz Mcgill NP    Ms. Scottie Martinez is a 74y.o. year old female with Diffuse Large B-cell lymphoma. ,   left breast fibroadenoma as per biopsy,  progrssivley enlarging  abdominal hernia repaired     New PORT on right chest is  well positioned by my palpation     Right medial proximal leg adenopathy much smaller smaller again. Now like a flat almond     Current Therapy:   R mini CHOP  At least 6 cycles perhaps 8. Subjective: The past medical, surgical and social history has been reviewed and remains unchanged.    Past Medical History:   Diagnosis Date    Arthritis     Diabetes ([facility] [address]     Hypertension     Lymphoma ([facility] [address]     Right leg     Past Surgical History:   Procedure Laterality Date    COLONOSCOPY N/A 2022    COLONOSCOPY performed by Craig Favre, MD at [facility] ENDOSCOPY    HX BACK SURGERY      HX  SECTION      HX CHOLECYSTECTOMY      2022    HX ENDOSCOPY      EGD 2022    HX HERNIA REPAIR      HX HYSTERECTOMY      IR BX BONE MARROW DIAGNOSTIC  2022    IR INSERT TUNL CVC W PORT OVER 5 YEARS  2022    IR REPAIR CVAD W PORT / PUMP  2022     Social History     Socioeconomic History    Marital status:      Spouse name: Not on file    Number of children: Not on file    Years of education: Not on file    Highest education level: Not on file   Occupational History    Not on file   Tobacco Use    Smoking status: Never    Smokeless tobacco: Never   Vaping Use    Vaping Use: Never used   Substance and Sexual Activity    Alcohol use: Never    Drug use: Never    Sexual activity: Not on file   Other Topics Concern    Not on file   Social History Narrative    Not on file     Social Determinants of Health     Financial Resource Strain: Not on file   Food Insecurity: Not on file   Transportation Needs: Not on file   Physical Activity: Not on file   Stress: Not on file   Social Connections: Not on file   Intimate Partner Violence: Not on file   Housing Stability: Not on file     Family History   Problem Relation Age of Onset    Stroke Mother     Cancer Sister     Diabetes Sister     Hypertension Sister     Dementia Brother      Current Outpatient Medications   Medication Sig Dispense Refill    omeprazole (PRILOSEC) 40 mg capsule Take 1 Capsule by mouth two (2) times a day for 360 days. Indications: gastroesophageal reflux disease, patient is on high dose prednisone for her lymphoma 180 Capsule 3    oxyCODONE-acetaminophen (Percocet) 5-325 mg per tablet Take 1 Tablet by mouth every four (4) hours as needed for Pain for up to 7 days. Max Daily Amount: 6 Tablets. Indications: pain, severe headache when on the high dose prednison 40 Tablet 0    glyBURIDE (DIABETA) 1.25 mg tablet Take 1 Tablet by mouth Daily (before breakfast). 30 Tablet 1    oxybutynin (DITROPAN) 5 mg tablet Take 0.5 Tablets by mouth two (2) times a day for 60 days. 30 Tablet 1    lidocaine-prilocaine (EMLA) topical cream Apply to port 1/2 hour prior to needle insertion 30 g 5    prochlorperazine (COMPAZINE) 10 mg tablet Take 0.5 Tablets by mouth every six (6) hours as needed for Nausea or Vomiting for up to 180 days. Indications: nausea and vomiting caused by cancer drugs (Patient not taking: Reported on 6/28/2022) 40 Tablet 5    predniSONE (DELTASONE) 10 mg tablet Take 70 mg by mouth daily for 30 days. Indications: diffuse large B-cell lymphoma (Patient not taking: Reported on 6/28/2022) 35 Tablet 5    therapeutic multivitamin (THERAGRAN) tablet Take 1 Tablet by mouth daily. losartan (COZAAR) 50 mg tablet Take 1 Tablet by mouth two (2) times a day. 30 Tablet 2    verapamiL (CALAN) 80 mg tablet Take 1 Tablet by mouth two (2) times a day. 30 Tablet 2    metFORMIN (GLUCOPHAGE) 850 mg tablet Take 1 Tablet by mouth two (2) times a day. 30 Tablet 2    OTHER Take 5 mg by mouth nightly.  glibenclamida - diabetes from [place]      OTHER Take 2 mg by mouth nightly. tolterodina - bladder - from [place] (Patient not taking: Reported on 6/28/2022)      indomethacin (INDOCIN) 25 mg capsule Take 25 mg by mouth two (2) times a day. Facility-Administered Medications Ordered in Other Visits   Medication Dose Route Frequency Provider Last Rate Last Admin    0.9% sodium chloride infusion  20 mL/hr IntraVENous CONTINUOUS Jovanna Beltran MD 20 mL/hr at 04/25/22 0924 20 mL/hr at 04/25/22 0924    heparin (porcine) 100 unit/mL injection 500 Units  500 Units InterCATHeter PRN Rolando Bernal MD   500 Units at 04/25/22 1024       Review of Systems:    General :The patient has no complaints outside of headaches while on Prednisone and swings in appetite and weight gain    Psychological : patient denies having any psychological symptoms such as hallucinations depression or anxiety. Ophthalmic:the patient denies having any visual impairment or eye discomfort. ENT: there are no abnormalities reported. Allergy and Immunology:the patient denies having any seasonal allergies or allergies to medications other than those already outlined above. Hematological and Lymphatic: the patient denies having any bruising, bleeding or lymphadenopathy. Endocrine: the patient denies having any heat or cold intolerance. There is no history of diabetes or thyroid disorders. Breast: the patient denies having any history of breast mass or lumps. Respiratory:the patient denies having any cough, shortness of breath, or dyspnea on exertion. Cardiovascular: there are no complaints of chest pain, palpitations, chest pounding, or dyspnea on exertion. Gastrointestinal: the patient denies having nausea, emesis, diarrhea, constipation, or blood in the stool. Genito-Urinary: the patient denies having urinary urgency, frequency, or dysuria.      Musculoskeletal: with the exception of mild arthralgias the patient has no other musculoskeletal complaints. Neurological:  denies having any numbness, tingling, or neurologic deficits. Dermatological: patient denies having any unexplained rash, skin ulcerations, or hives. Objective:     Visit Vitals  BP (!) 172/94   Pulse 90   Temp 98 °F (36.7 °C) (Temporal)   Resp 16   Ht 4' 11\" (1.499 m)   Wt 76.7 kg (169 lb)   SpO2 97%   BMI 34.13 kg/m²     Pain Score: 3    Physical Exam:     ECO    General: Well appearing, in NAD    Psychologic: mood and affect are appropriate, no anxiety or depression noted    Skin: examination of the skin reveals no bruising, rash or petechiae    HEENT: Normocephalic, atraumatic. Conjunctiva and sclera are clear. Pupils are equal, round and reactive to light. EOMs are intact. Neck: supple without lymphadenopathy, JVD or thyromegaly    Lymphatics: no palpable cervical, supraclavicular, infraclavicular, axillary or inguinal lymphadenopathy  right femoral LND is 1 X 2.5 cm flat, much smaller again     Lungs: clear breath sounds bilaterally, no rhonchi or wheezes noted    Heart: Regular rate and rhythm, no murmurs, rubs or gallops, S1-S2 noted    Abdomen: soft, non-tender, non-distended, no HSM      Extremities: without clubbing, cyanosis or edema    Neurologic: no focal deficits, steady gait, Alert and oriented x 3. Laboratory Data:     Results for orders placed or performed during the hospital encounter of 22   CBC WITH 3 PART DIFF     Status: Abnormal   Result Value Ref Range Status    WBC 5.4 4.5 - 13.0 K/uL Final    RBC 4.23 4. 10 - 5.10 M/uL Final    HGB 11.2 (L) 12.0 - 16.0 g/dL Final    HCT 36.3 36 - 48 % Final    MCV 85.8 78 - 102 FL Final    MCH 26.5 25.0 - 35.0 PG Final    MCHC 30.9 (L) 31 - 37 g/dL Final    RDW 13.0 11.5 - 14.5 % Final    PLATELET 823 600 - 645 K/uL Final    NEUTROPHILS 71 (H) 40 - 70 % Final    Mixed cells 14 0.1 - 17 % Final    LYMPHOCYTES 15 14 - 44 % Final    ABS. NEUTROPHILS 3.8 1.8 - 9.5 K/UL Final    ABS.  MIXED CELLS 0.8 0.0 - 2.3 K/uL Final    ABS. LYMPHOCYTES 0.8 (L) 1.1 - 5.9 K/UL Final     Comment: Test performed at [address] [place] [address] or Outpatient Infusion Center Location. Reviewed by Medical Director. DF AUTOMATED   Final       Patient Active Problem List   Diagnosis Code    Lump of skin of right lower extremity R22.41    Primary osteoarthritis of both knees M17.0    Diabetes mellitus without complication (HCC) J17.1    Helicobacter pylori antibody positive R76.8    DLBCL (diffuse large B cell lymphoma) (HCC) C83.30         Assessment:     1. Pyrosis    2. Diffuse large B-cell lymphoma of lymph nodes of lower extremity (HCC)    3. Primary osteoarthritis of both knees    4. Vitamin D deficiency    5. 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 Other iron deficiency anemia    8. Vitamin B 12 deficiency        Plan:     Continue R mini CHOP  We  will check the laboratory tests including iron studies. Follow-up visit in 4 weeks. Patient and her daughter had opportunity to ask excellent questions and we answered these  Patient and her daughter are in agreement with the plan above    Follow-up and Dispositions    Return in about 4 weeks (around 9/12/2022) for Follow_up In Person.         Orders Placed This Encounter    CBC WITH AUTOMATED DIFF     Standing Status:   Future     Standing Expiration Date:   8/16/2023    FERRITIN     Standing Status:   Future     Standing Expiration Date:   8/16/2023    VITAMIN D, 25 HYDROXY     Standing Status:   Future     Standing Expiration Date:   8/16/2023    VITAMIN B12 & FOLATE     Standing Status:   Future     Standing Expiration Date:   0/17/8684    METABOLIC PANEL, COMPREHENSIVE     Standing Status:   Future     Standing Expiration Date:   8/16/2023    LD     Standing Status:   Future     Standing Expiration Date:   8/16/2023    IRON PROFILE     Standing Status:   Future     Standing Expiration Date:   8/16/2023 omeprazole (PRILOSEC) 40 mg capsule     Sig: Take 1 Capsule by mouth two (2) times a day for 360 days.  Indications: gastroesophageal reflux disease, patient is on high dose prednisone for her lymphoma     Dispense:  180 Capsule     Refill:  3       Beryle Lacer, MD  8/15/2022

## 2022-08-18 ENCOUNTER — DOCUMENTATION ONLY (OUTPATIENT)
Dept: ONCOLOGY | Age: 72
End: 2022-08-18

## 2022-08-18 NOTE — PROGRESS NOTES
Patient is approved for free drug for Ruxience and Neulasta.  Approval dates are from 04/28/2022 to 04/28/2023 ([id number] [address] through [employer]) and 04/28/2022 to 04/28/2023 (Neulasta through [address] [place] [address]) manufacturers or until the patient's financial status changes

## 2022-08-22 RX ORDER — HYDROCORTISONE SODIUM SUCCINATE 100 MG/2ML
100 INJECTION, POWDER, FOR SOLUTION INTRAMUSCULAR; INTRAVENOUS AS NEEDED
Status: CANCELLED | OUTPATIENT
Start: 2022-08-29

## 2022-08-22 RX ORDER — ACETAMINOPHEN 325 MG/1
650 TABLET ORAL AS NEEDED
Status: CANCELLED
Start: 2022-08-29

## 2022-08-22 RX ORDER — ONDANSETRON 2 MG/ML
8 INJECTION INTRAMUSCULAR; INTRAVENOUS AS NEEDED
Status: CANCELLED | OUTPATIENT
Start: 2022-08-29

## 2022-08-22 RX ORDER — DIPHENHYDRAMINE HYDROCHLORIDE 50 MG/ML
50 INJECTION, SOLUTION INTRAMUSCULAR; INTRAVENOUS AS NEEDED
Status: CANCELLED
Start: 2022-08-29

## 2022-08-22 RX ORDER — SODIUM CHLORIDE 9 MG/ML
10 INJECTION INTRAMUSCULAR; INTRAVENOUS; SUBCUTANEOUS AS NEEDED
Status: CANCELLED | OUTPATIENT
Start: 2022-08-29

## 2022-08-22 RX ORDER — DIPHENHYDRAMINE HYDROCHLORIDE 50 MG/ML
25 INJECTION, SOLUTION INTRAMUSCULAR; INTRAVENOUS AS NEEDED
Status: CANCELLED
Start: 2022-08-29

## 2022-08-22 RX ORDER — ALBUTEROL SULFATE 0.83 MG/ML
2.5 SOLUTION RESPIRATORY (INHALATION) AS NEEDED
Status: CANCELLED
Start: 2022-08-29

## 2022-08-22 RX ORDER — EPINEPHRINE 1 MG/ML
0.3 INJECTION, SOLUTION, CONCENTRATE INTRAVENOUS AS NEEDED
Status: CANCELLED | OUTPATIENT
Start: 2022-08-29

## 2022-08-26 ENCOUNTER — HOSPITAL ENCOUNTER (OUTPATIENT)
Dept: INFUSION THERAPY | Age: 72
Discharge: HOME | End: 2022-08-26

## 2022-08-26 VITALS
WEIGHT: 166 LBS | DIASTOLIC BLOOD PRESSURE: 70 MMHG | HEIGHT: 59 IN | BODY MASS INDEX: 33.47 KG/M2 | HEART RATE: 87 BPM | OXYGEN SATURATION: 99 % | RESPIRATION RATE: 16 BRPM | SYSTOLIC BLOOD PRESSURE: 151 MMHG | TEMPERATURE: 97.9 F

## 2022-08-26 DIAGNOSIS — D50.8 OTHER IRON DEFICIENCY ANEMIA: ICD-10-CM

## 2022-08-26 DIAGNOSIS — E53.8 VITAMIN B 12 DEFICIENCY: ICD-10-CM

## 2022-08-26 DIAGNOSIS — C83.35 DIFFUSE LARGE B-CELL LYMPHOMA OF LYMPH NODES OF LOWER EXTREMITY (HCC): ICD-10-CM

## 2022-08-26 DIAGNOSIS — K27.9 PUD (PEPTIC ULCER DISEASE): ICD-10-CM

## 2022-08-26 DIAGNOSIS — E13.9 DIABETES 1.5, MANAGED AS TYPE 2 (HCC): ICD-10-CM

## 2022-08-26 DIAGNOSIS — M17.0 PRIMARY OSTEOARTHRITIS OF BOTH KNEES: ICD-10-CM

## 2022-08-26 DIAGNOSIS — R12 PYROSIS: ICD-10-CM

## 2022-08-26 DIAGNOSIS — E55.9 VITAMIN D DEFICIENCY: ICD-10-CM

## 2022-08-26 LAB
25(OH)D3 SERPL-MCNC: 30 NG/ML (ref 30–100)
ALBUMIN SERPL-MCNC: 3.1 G/DL (ref 3.4–5)
ALBUMIN/GLOB SERPL: 0.9 {RATIO} (ref 0.8–1.7)
ALP SERPL-CCNC: 101 U/L (ref 45–117)
ALT SERPL-CCNC: 25 U/L (ref 13–56)
ANION GAP SERPL CALC-SCNC: 10 MMOL/L (ref 3–18)
AST SERPL-CCNC: 19 U/L (ref 10–38)
BASOPHILS # BLD: 0.1 K/UL (ref 0–0.1)
BASOPHILS NFR BLD: 2 % (ref 0–2)
BILIRUB SERPL-MCNC: 0.2 MG/DL (ref 0.2–1)
BUN SERPL-MCNC: 16 MG/DL (ref 7–18)
BUN/CREAT SERPL: 19 (ref 12–20)
CALCIUM SERPL-MCNC: 8.8 MG/DL (ref 8.5–10.1)
CHLORIDE SERPL-SCNC: 105 MMOL/L (ref 100–111)
CO2 SERPL-SCNC: 24 MMOL/L (ref 21–32)
CREAT SERPL-MCNC: 0.84 MG/DL (ref 0.6–1.3)
DIFFERENTIAL METHOD BLD: ABNORMAL
EOSINOPHIL # BLD: 0.1 K/UL (ref 0–0.4)
EOSINOPHIL NFR BLD: 2 % (ref 0–5)
ERYTHROCYTE [DISTWIDTH] IN BLOOD BY AUTOMATED COUNT: 16.3 % (ref 11.6–14.5)
FERRITIN SERPL-MCNC: 97 NG/ML (ref 8–388)
FOLATE SERPL-MCNC: 10.7 NG/ML (ref 3.1–17.5)
GLOBULIN SER CALC-MCNC: 3.4 G/DL (ref 2–4)
GLUCOSE SERPL-MCNC: 209 MG/DL (ref 74–99)
HCT VFR BLD AUTO: 33.6 % (ref 35–45)
HGB BLD-MCNC: 10.3 G/DL (ref 12–16)
IMM GRANULOCYTES # BLD AUTO: 0.1 K/UL (ref 0–0.04)
IMM GRANULOCYTES NFR BLD AUTO: 1 % (ref 0–0.5)
IRON SATN MFR SERPL: 18 % (ref 20–50)
IRON SERPL-MCNC: 49 UG/DL (ref 50–175)
LDH SERPL L TO P-CCNC: 225 U/L (ref 81–234)
LYMPHOCYTES # BLD: 0.6 K/UL (ref 0.9–3.6)
LYMPHOCYTES NFR BLD: 13 % (ref 21–52)
MCH RBC QN AUTO: 26.4 PG (ref 24–34)
MCHC RBC AUTO-ENTMCNC: 30.7 G/DL (ref 31–37)
MCV RBC AUTO: 86.2 FL (ref 78–100)
MONOCYTES # BLD: 0.7 K/UL (ref 0.05–1.2)
MONOCYTES NFR BLD: 14 % (ref 3–10)
NEUTS SEG # BLD: 3.3 K/UL (ref 1.8–8)
NEUTS SEG NFR BLD: 68 % (ref 40–73)
NRBC # BLD: 0 K/UL (ref 0–0.01)
NRBC BLD-RTO: 0 PER 100 WBC
PLATELET # BLD AUTO: 331 K/UL (ref 135–420)
PMV BLD AUTO: 9.3 FL (ref 9.2–11.8)
POTASSIUM SERPL-SCNC: 3.8 MMOL/L (ref 3.5–5.5)
PROT SERPL-MCNC: 6.5 G/DL (ref 6.4–8.2)
RBC # BLD AUTO: 3.9 M/UL (ref 4.2–5.3)
SODIUM SERPL-SCNC: 139 MMOL/L (ref 136–145)
TIBC SERPL-MCNC: 277 UG/DL (ref 250–450)
VIT B12 SERPL-MCNC: >2000 PG/ML (ref 211–911)
WBC # BLD AUTO: 4.9 K/UL (ref 4.6–13.2)

## 2022-08-26 PROCEDURE — 85025 COMPLETE CBC W/AUTO DIFF WBC: CPT

## 2022-08-26 PROCEDURE — 82728 ASSAY OF FERRITIN: CPT

## 2022-08-26 PROCEDURE — 77030012965 HC NDL HUBR BBMI -A

## 2022-08-26 PROCEDURE — 83615 LACTATE (LD) (LDH) ENZYME: CPT

## 2022-08-26 PROCEDURE — 82607 VITAMIN B-12: CPT

## 2022-08-26 PROCEDURE — 74011000250 HC RX REV CODE- 250: Performed by: INTERNAL MEDICINE

## 2022-08-26 PROCEDURE — 36591 DRAW BLOOD OFF VENOUS DEVICE: CPT

## 2022-08-26 PROCEDURE — 80053 COMPREHEN METABOLIC PANEL: CPT

## 2022-08-26 PROCEDURE — 74011250636 HC RX REV CODE- 250/636: Performed by: INTERNAL MEDICINE

## 2022-08-26 PROCEDURE — 36593 DECLOT VASCULAR DEVICE: CPT

## 2022-08-26 PROCEDURE — 83540 ASSAY OF IRON: CPT

## 2022-08-26 PROCEDURE — 82306 VITAMIN D 25 HYDROXY: CPT

## 2022-08-26 RX ORDER — HEPARIN 100 UNIT/ML
500 SYRINGE INTRAVENOUS ONCE
Status: ACTIVE | OUTPATIENT
Start: 2022-08-26 | End: 2022-08-26

## 2022-08-26 RX ORDER — HEPARIN 100 UNIT/ML
SYRINGE INTRAVENOUS
Status: DISPENSED
Start: 2022-08-26 | End: 2022-08-26

## 2022-08-26 RX ORDER — SODIUM CHLORIDE 0.9 % (FLUSH) 0.9 %
5-10 SYRINGE (ML) INJECTION AS NEEDED
Status: DISCONTINUED | OUTPATIENT
Start: 2022-08-26 | End: 2022-08-28 | Stop reason: HOSPADM

## 2022-08-26 RX ADMIN — WATER 2 MG: 1 INJECTION INTRAMUSCULAR; INTRAVENOUS; SUBCUTANEOUS at 08:37

## 2022-08-26 NOTE — PROGRESS NOTES
[facility] Progress Note    Date: 2022    Name: Brandan Altamirano              MRN: [id number]              : 1950    Pre-Chemo labs    Ms. Ajay Bauer was assessed and education was provided. Ms. Ajay Bauer and daughter in today for pre-chemo labs. Ms. Korey Celeste vitals were reviewed. Visit Vitals  BP (!) 151/70 (BP 1 Location: Left arm, BP Patient Position: Sitting)   Pulse 87   Temp 97.9 °F (36.6 °C)   Resp 16   Ht 4' 11\" (1.499 m)   Wt 75.3 kg (166 lb)   SpO2 99%   BMI 33.53 kg/m²       Right chest medi-port accessed, lines flushes without blood return. Cathflo instilled per protocol @0835 and dwells for 30 minutes with blood return successfully regained, 10ml of blood discarded and line flushed with NS and heparin, de-accessed per protocol. Port flushed and de-accessed per orders, per protocol without complications. Band aid applied to site     Ms. Ajay Bauer tolerated the blood draw, and had no complaints at this time. Ms. Ajay Bauer was discharged from [place] [address] in stable condition at [address] [place] [address]. She is to return on 22 at 0800 for her next appointment. Armband removed and shredded.     Marilynn Linares RN  2022  9:29 AM

## 2022-08-29 ENCOUNTER — HOSPITAL ENCOUNTER (OUTPATIENT)
Dept: INFUSION THERAPY | Age: 72
Discharge: HOME | End: 2022-08-29

## 2022-08-29 VITALS
HEART RATE: 75 BPM | TEMPERATURE: 98.6 F | DIASTOLIC BLOOD PRESSURE: 67 MMHG | RESPIRATION RATE: 16 BRPM | OXYGEN SATURATION: 99 % | SYSTOLIC BLOOD PRESSURE: 152 MMHG

## 2022-08-29 DIAGNOSIS — C83.30 DIFFUSE LARGE B-CELL LYMPHOMA, UNSPECIFIED BODY REGION (HCC): Primary | ICD-10-CM

## 2022-08-29 DIAGNOSIS — R22.41 LUMP OF SKIN OF RIGHT LOWER EXTREMITY: ICD-10-CM

## 2022-08-29 PROCEDURE — 96375 TX/PRO/DX INJ NEW DRUG ADDON: CPT

## 2022-08-29 PROCEDURE — 96411 CHEMO IV PUSH ADDL DRUG: CPT

## 2022-08-29 PROCEDURE — 96377 APPLICATON ON-BODY INJECTOR: CPT

## 2022-08-29 PROCEDURE — 96415 CHEMO IV INFUSION ADDL HR: CPT

## 2022-08-29 PROCEDURE — 96413 CHEMO IV INFUSION 1 HR: CPT

## 2022-08-29 PROCEDURE — 96367 TX/PROPH/DG ADDL SEQ IV INF: CPT

## 2022-08-29 PROCEDURE — 74011250636 HC RX REV CODE- 250/636: Performed by: INTERNAL MEDICINE

## 2022-08-29 PROCEDURE — 77030012965 HC NDL HUBR BBMI -A

## 2022-08-29 PROCEDURE — 74011000250 HC RX REV CODE- 250: Performed by: INTERNAL MEDICINE

## 2022-08-29 PROCEDURE — 74011000258 HC RX REV CODE- 258: Performed by: INTERNAL MEDICINE

## 2022-08-29 PROCEDURE — 74011250637 HC RX REV CODE- 250/637: Performed by: INTERNAL MEDICINE

## 2022-08-29 PROCEDURE — 96417 CHEMO IV INFUS EACH ADDL SEQ: CPT

## 2022-08-29 RX ORDER — HEPARIN 100 UNIT/ML
300-500 SYRINGE INTRAVENOUS AS NEEDED
Status: DISPENSED | OUTPATIENT
Start: 2022-08-29 | End: 2022-08-29

## 2022-08-29 RX ORDER — SODIUM CHLORIDE 9 MG/ML
25 INJECTION, SOLUTION INTRAVENOUS CONTINUOUS
Status: DISPENSED | OUTPATIENT
Start: 2022-08-29 | End: 2022-08-29

## 2022-08-29 RX ORDER — PALONOSETRON 0.05 MG/ML
0.25 INJECTION, SOLUTION INTRAVENOUS ONCE
Status: COMPLETED | OUTPATIENT
Start: 2022-08-29 | End: 2022-08-29

## 2022-08-29 RX ORDER — SODIUM CHLORIDE 9 MG/ML
100 INJECTION, SOLUTION INTRAVENOUS CONTINUOUS
Status: DISPENSED | OUTPATIENT
Start: 2022-08-29 | End: 2022-08-29

## 2022-08-29 RX ORDER — ACETAMINOPHEN 325 MG/1
650 TABLET ORAL ONCE
Status: COMPLETED | OUTPATIENT
Start: 2022-08-29 | End: 2022-08-29

## 2022-08-29 RX ORDER — SODIUM CHLORIDE 0.9 % (FLUSH) 0.9 %
10 SYRINGE (ML) INJECTION AS NEEDED
Status: DISPENSED | OUTPATIENT
Start: 2022-08-29 | End: 2022-08-29

## 2022-08-29 RX ORDER — DOXORUBICIN HYDROCHLORIDE 2 MG/ML
25 INJECTION, SOLUTION INTRAVENOUS ONCE
Status: COMPLETED | OUTPATIENT
Start: 2022-08-29 | End: 2022-08-29

## 2022-08-29 RX ORDER — DIPHENHYDRAMINE HYDROCHLORIDE 50 MG/ML
50 INJECTION, SOLUTION INTRAMUSCULAR; INTRAVENOUS ONCE
Status: COMPLETED | OUTPATIENT
Start: 2022-08-29 | End: 2022-08-29

## 2022-08-29 RX ADMIN — DIPHENHYDRAMINE HYDROCHLORIDE 50 MG: 50 INJECTION, SOLUTION INTRAMUSCULAR; INTRAVENOUS at 08:35

## 2022-08-29 RX ADMIN — SODIUM CHLORIDE 100 ML/HR: 0.9 INJECTION, SOLUTION INTRAVENOUS at 09:06

## 2022-08-29 RX ADMIN — FOSAPREPITANT 150 MG: 150 INJECTION, POWDER, LYOPHILIZED, FOR SOLUTION INTRAVENOUS at 11:40

## 2022-08-29 RX ADMIN — SODIUM CHLORIDE 660 MG: 0.9 INJECTION, SOLUTION INTRAVENOUS at 09:13

## 2022-08-29 RX ADMIN — HEPARIN 500 UNITS: 100 SYRINGE at 15:20

## 2022-08-29 RX ADMIN — PEGFILGRASTIM 6 MG: KIT SUBCUTANEOUS at 14:15

## 2022-08-29 RX ADMIN — SODIUM CHLORIDE 25 ML/HR: 0.9 INJECTION, SOLUTION INTRAVENOUS at 08:37

## 2022-08-29 RX ADMIN — DEXAMETHASONE SODIUM PHOSPHATE 12 MG: 10 INJECTION INTRAMUSCULAR; INTRAVENOUS at 12:03

## 2022-08-29 RX ADMIN — SODIUM CHLORIDE 500 ML: 0.9 INJECTION, SOLUTION INTRAVENOUS at 08:36

## 2022-08-29 RX ADMIN — VINCRISTINE SULFATE 1 MG: 1 INJECTION, SOLUTION INTRAVENOUS at 13:55

## 2022-08-29 RX ADMIN — Medication 10 ML: at 15:20

## 2022-08-29 RX ADMIN — ACETAMINOPHEN 650 MG: 325 TABLET ORAL at 08:35

## 2022-08-29 RX ADMIN — PALONOSETRON 0.25 MG: 0.05 INJECTION, SOLUTION INTRAVENOUS at 12:06

## 2022-08-29 RX ADMIN — CYCLOPHOSPHAMIDE 700 MG: 200 INJECTION, SOLUTION INTRAVENOUS at 12:27

## 2022-08-29 RX ADMIN — DOXORUBICIN HYDROCHLORIDE 44 MG: 2 INJECTION, SOLUTION INTRAVENOUS at 13:13

## 2022-08-29 NOTE — PROGRESS NOTES
[facility] Progress Note    Date: 2022    Name: Kleber Fields              MRN: [id number]              : 1950    Chemotherapy Cycle: C4D1    R-chop (Rituxan,Cytoxan, Adriamycin, Vincristine)     Prednisone po (Home days 1-5)    Ms. Terrell Christensen was assessed and education was provided. Ms. Terrell Christensen arrived ambulatory using cane accompanied by her daughter. Translation services being used via ipad. Treatment education reviewed with Ms. Terrell Christensen and daughter. Pt reports fatigue and nausea after previous treatment, adequate hydration and nutrition encouraged. Ms. Santizo Nail vitals were reviewed. Visit Vitals  BP (!) 152/67   Pulse 75   Temp 98.6 °F (37 °C)   Resp 16   SpO2 99%       Lab results were obtained and reviewed dated 22. EF 60-65 % on echo dated 22. Mediport accessed with 20 gauge 0.75 inch needle without complications. Port flushes without resistance and positive brisk blood return noted. Treatment initiated per orders. NS  ml bolus initiated and completed. NS infusing IV 100ml/hr per order. Pre-medications (Tylenol 650 mg po, Benadryl 50 mg IV) were administered as ordered. Rituxan 660 mg was initiated as ordered at 100 mg/hr (50 ml/hr) for 30 minutes, increased by 100mg/hr (50ml/hr) every 30 minutes to max rate of  400 mg/hr (200mg/hr) per orders without complications. Pt tolerates well, is resting comfortably in stretcher. VS remain stable. Port flushed and blood return reverified. Pre-medications (Emend 150 mg IV, Decadron, Aloxi 0.25 mg IV) were administered as ordered and after 30 minutes next chemo initiated per orders. Port flushed and blood return re-verified. Cytoxan 700 mg IV was infused at 557 ml/hr without complications or reaction. Port flushed and blood return reverified. Adriamycin 44 mg IV push over 10 minutes per protocol without complications or s/s of reaction. Blood return verified throughout IVP. Port flushed and blood return reverified. Vincristine 1 mg IV was infused @ 300 ml/hr per orders without complications or s/s of reaction. Port flushed and blood return re-verified. Neulasta 6 mg on-body injector applied to patient's abdomen. Instructions reviewed with patient and daughter on monitoring and when to remove device. Patient and daughter verbalized understanding. Port flushed and de-accessed per orders, per protocol without complications. Band aid applied to site. Ms. Lorraine Ingram tolerated infusions, and had no complaints at this time. Ms. Lorraine Ingram was discharged from [place] [address] in stable condition at 1430. She is to return on 09/16/22 for her next appointment.     Joelle Cohn RN  August 29, 2022  11:13 AM

## 2022-09-12 ENCOUNTER — OFFICE VISIT (OUTPATIENT)
Age: 72
End: 2022-09-12

## 2022-09-12 VITALS
OXYGEN SATURATION: 100 % | HEIGHT: 59 IN | BODY MASS INDEX: 33.67 KG/M2 | RESPIRATION RATE: 14 BRPM | TEMPERATURE: 97.6 F | SYSTOLIC BLOOD PRESSURE: 156 MMHG | HEART RATE: 88 BPM | DIASTOLIC BLOOD PRESSURE: 76 MMHG | WEIGHT: 167 LBS

## 2022-09-12 DIAGNOSIS — R22.41 MASS OF LEG, RIGHT: ICD-10-CM

## 2022-09-12 DIAGNOSIS — M17.0 PRIMARY OSTEOARTHRITIS OF BOTH KNEES: ICD-10-CM

## 2022-09-12 DIAGNOSIS — C83.35 DIFFUSE LARGE B-CELL LYMPHOMA OF LYMPH NODES OF LOWER EXTREMITY (HCC): Primary | ICD-10-CM

## 2022-09-12 DIAGNOSIS — E13.9 DIABETES 1.5, MANAGED AS TYPE 2 (HCC): ICD-10-CM

## 2022-09-12 DIAGNOSIS — K27.9 PUD (PEPTIC ULCER DISEASE): ICD-10-CM

## 2022-09-12 PROCEDURE — 3051F HG A1C>EQUAL 7.0%<8.0%: CPT | Performed by: INTERNAL MEDICINE

## 2022-09-12 PROCEDURE — 99214 OFFICE O/P EST MOD 30 MIN: CPT | Performed by: INTERNAL MEDICINE

## 2022-09-12 PROCEDURE — 1123F ACP DISCUSS/DSCN MKR DOCD: CPT | Performed by: INTERNAL MEDICINE

## 2022-09-12 RX ORDER — DIPHENHYDRAMINE HYDROCHLORIDE 50 MG/ML
25 INJECTION, SOLUTION INTRAMUSCULAR; INTRAVENOUS AS NEEDED
Status: CANCELLED
Start: 2022-09-19

## 2022-09-12 RX ORDER — EPINEPHRINE 1 MG/ML
0.3 INJECTION, SOLUTION, CONCENTRATE INTRAVENOUS AS NEEDED
Status: CANCELLED | OUTPATIENT
Start: 2022-09-19

## 2022-09-12 RX ORDER — ACETAMINOPHEN 325 MG/1
650 TABLET ORAL AS NEEDED
Status: CANCELLED
Start: 2022-09-19

## 2022-09-12 RX ORDER — HYDROCORTISONE SODIUM SUCCINATE 100 MG/2ML
100 INJECTION, POWDER, FOR SOLUTION INTRAMUSCULAR; INTRAVENOUS AS NEEDED
Status: CANCELLED | OUTPATIENT
Start: 2022-09-19

## 2022-09-12 RX ORDER — ALBUTEROL SULFATE 0.83 MG/ML
2.5 SOLUTION RESPIRATORY (INHALATION) AS NEEDED
Status: CANCELLED
Start: 2022-09-19

## 2022-09-12 RX ORDER — ONDANSETRON 2 MG/ML
8 INJECTION INTRAMUSCULAR; INTRAVENOUS AS NEEDED
Status: CANCELLED | OUTPATIENT
Start: 2022-09-19

## 2022-09-12 RX ORDER — DIPHENHYDRAMINE HYDROCHLORIDE 50 MG/ML
50 INJECTION, SOLUTION INTRAMUSCULAR; INTRAVENOUS AS NEEDED
Status: CANCELLED
Start: 2022-09-19

## 2022-09-12 NOTE — PROGRESS NOTES
Hematology/Oncology  Progress Note    Name: Wyatt Garcia  Date: 2022  : 1950  Primary Care Provider: Ruiz Mcgill NP    Ms. Scottie Martinez is a 74y.o. year old female with Diffuse Large B-cell lymphoma. ,   left breast fibroadenoma as per biopsy,  progrssivley enlarging  abdominal hernia repaired     New PORT on right chest is  well positioned by my palpation     Right medial proximal leg adenopathy much smaller smaller again. Now like a flat almond     Current Therapy:   R mini CHOP  now C4 will have At least 6 cycles perhaps 8..      Subjective: The past medical, surgical and social history has been reviewed and remains unchanged.    Past Medical History:   Diagnosis Date    Arthritis     Diabetes ([facility] [address]     Hypertension     Lymphoma ([facility] [address]     Right leg     Past Surgical History:   Procedure Laterality Date    COLONOSCOPY N/A 2022    COLONOSCOPY performed by Craig Favre, MD at [facility] ENDOSCOPY    HX BACK SURGERY      HX  SECTION      HX CHOLECYSTECTOMY      2022    HX ENDOSCOPY      EGD 2022    HX HERNIA REPAIR      HX HYSTERECTOMY      IR BX BONE MARROW DIAGNOSTIC  2022    IR INSERT TUNL CVC W PORT OVER 5 YEARS  2022    IR REPAIR CVAD W PORT / PUMP  2022     Social History     Socioeconomic History    Marital status:      Spouse name: Not on file    Number of children: Not on file    Years of education: Not on file    Highest education level: Not on file   Occupational History    Not on file   Tobacco Use    Smoking status: Never    Smokeless tobacco: Never   Vaping Use    Vaping Use: Never used   Substance and Sexual Activity    Alcohol use: Never    Drug use: Never    Sexual activity: Not on file   Other Topics Concern    Not on file   Social History Narrative    Not on file     Social Determinants of Health     Financial Resource Strain: Not on file   Food Insecurity: Not on file   Transportation Needs: Not on file   Physical Activity: Not on file   Stress: Not on file   Social Connections: Not on file   Intimate Partner Violence: Not on file   Housing Stability: Not on file     Family History   Problem Relation Age of Onset    Stroke Mother     Cancer Sister     Diabetes Sister     Hypertension Sister     Dementia Brother      Current Outpatient Medications   Medication Sig Dispense Refill    omeprazole (PRILOSEC) 40 mg capsule Take 1 Capsule by mouth two (2) times a day for 360 days. Indications: gastroesophageal reflux disease, patient is on high dose prednisone for her lymphoma 180 Capsule 3    oxyCODONE-acetaminophen (Percocet) 5-325 mg per tablet Take 1 Tablet by mouth every four (4) hours as needed for Pain for up to 7 days. Max Daily Amount: 6 Tablets. Indications: pain, severe headache when on the high dose prednison 40 Tablet 0    glyBURIDE (DIABETA) 1.25 mg tablet Take 1 Tablet by mouth Daily (before breakfast). 30 Tablet 1    lidocaine-prilocaine (EMLA) topical cream Apply to port 1/2 hour prior to needle insertion 30 g 5    prochlorperazine (COMPAZINE) 10 mg tablet Take 0.5 Tablets by mouth every six (6) hours as needed for Nausea or Vomiting for up to 180 days. Indications: nausea and vomiting caused by cancer drugs (Patient not taking: No sig reported) 40 Tablet 5    predniSONE (DELTASONE) 10 mg tablet Take 70 mg by mouth daily for 30 days. Indications: diffuse large B-cell lymphoma (Patient not taking: Reported on 6/28/2022) 35 Tablet 5    therapeutic multivitamin (THERAGRAN) tablet Take 1 Tablet by mouth daily. losartan (COZAAR) 50 mg tablet Take 1 Tablet by mouth two (2) times a day. 30 Tablet 2    verapamiL (CALAN) 80 mg tablet Take 1 Tablet by mouth two (2) times a day. 30 Tablet 2    metFORMIN (GLUCOPHAGE) 850 mg tablet Take 1 Tablet by mouth two (2) times a day. 30 Tablet 2    OTHER Take 5 mg by mouth nightly. glibenclamida - diabetes from [place]      OTHER Take 2 mg by mouth nightly.  tolterodina - bladder - from [facility] (Patient not taking: No sig reported)      indomethacin (INDOCIN) 25 mg capsule Take 25 mg by mouth two (2) times a day. Facility-Administered Medications Ordered in Other Visits   Medication Dose Route Frequency Provider Last Rate Last Admin    0.9% sodium chloride infusion  20 mL/hr IntraVENous CONTINUOUS Shy More MD 20 mL/hr at 04/25/22 0924 20 mL/hr at 04/25/22 0924    heparin (porcine) 100 unit/mL injection 500 Units  500 Units InterCATHeter PRN J Luis Swenson MD   500 Units at 04/25/22 1024       Review of Systems:    General :The patient has no complaints and there is no physical distress evident. Psychological : patient denies having any psychological symptoms such as hallucinations depression or anxiety. Ophthalmic:the patient denies having any visual impairment or eye discomfort. ENT: there are no abnormalities reported. Allergy and Immunology:the patient denies having any seasonal allergies or allergies to medications other than those already outlined above. Hematological and Lymphatic: the patient denies having any bruising, bleeding or lymphadenopathy. Endocrine: the patient denies having any heat or cold intolerance. There is no history of diabetes or thyroid disorders. Breast: the patient denies having any history of breast mass or lumps. Respiratory:the patient denies having any cough, shortness of breath, or dyspnea on exertion. Cardiovascular: there are no complaints of chest pain, palpitations, chest pounding, or dyspnea on exertion. Gastrointestinal: the patient denies having nausea, emesis, diarrhea, constipation, or blood in the stool. Genito-Urinary: the patient denies having urinary urgency, frequency, or dysuria. Musculoskeletal: with the exception of mild arthralgias the patient has no other musculoskeletal complaints. Neurological:  denies having any numbness, tingling, or neurologic deficits.      Dermatological: patient denies having any unexplained rash, skin ulcerations, or hives. Objective:     Visit Vitals  BP (!) 156/76 (BP 1 Location: Right upper arm, BP Patient Position: Sitting)   Pulse 88   Temp 97.6 °F (36.4 °C) (Temporal)   Resp 14   Ht 4' 11\" (1.499 m)   Wt 75.8 kg (167 lb)   SpO2 100%   BMI 33.73 kg/m²     Pain Score: 2    Physical Exam: Chaperone Daughter and David Kumar RN    ECO    General: Well appearing, in NAD    Psychologic: mood and affect are appropriate, no anxiety or depression noted    Skin: examination of the skin reveals no bruising, rash or petechiae    HEENT: Normocephalic, atraumatic. Conjunctiva and sclera are clear. Pupils are equal, round and reactive to light. EOMs are intact. ENT without oral mucosal lesions, stomatitis or thrush    Neck: supple without lymphadenopathy, JVD or thyromegaly    Lymphatics: no palpable cervical, supraclavicular, infraclavicular, axillary or inguinal lymphadenopathy  right femoral LND is 1 X 1 cm flat, much smaller again     Lungs: clear breath sounds bilaterally, no rhonchi or wheezes noted    Heart: Regular rate and rhythm, no murmurs, rubs or gallops, S1-S2 noted. Abdomen: soft, non-tender, non-distended, no HSM    Extremities: without clubbing, cyanosis or edema    Neurologic: no focal deficits, steady gait, Alert and oriented x 3. Laboratory Data:     Results for orders placed or performed during the hospital encounter of 22   CBC WITH 3 PART DIFF     Status: Abnormal   Result Value Ref Range Status    WBC 5.4 4.5 - 13.0 K/uL Final    RBC 4.23 4. 10 - 5.10 M/uL Final    HGB 11.2 (L) 12.0 - 16.0 g/dL Final    HCT 36.3 36 - 48 % Final    MCV 85.8 78 - 102 FL Final    MCH 26.5 25.0 - 35.0 PG Final    MCHC 30.9 (L) 31 - 37 g/dL Final    RDW 13.0 11.5 - 14.5 % Final    PLATELET 852 513 - 710 K/uL Final    NEUTROPHILS 71 (H) 40 - 70 % Final    Mixed cells 14 0.1 - 17 % Final    LYMPHOCYTES 15 14 - 44 % Final    ABS.  NEUTROPHILS 3.8 1.8 - 9.5 K/UL Final    ABS. MIXED CELLS 0.8 0.0 - 2.3 K/uL Final    ABS. LYMPHOCYTES 0.8 (L) 1.1 - 5.9 K/UL Final     Comment: Test performed at [address] [place] [address] or Outpatient Infusion Center Location. Reviewed by Medical Director. DF AUTOMATED   Final       Patient Active Problem List   Diagnosis Code    Lump of skin of right lower extremity R22.41    Primary osteoarthritis of both knees M17.0    Diabetes mellitus without complication (HCC) S75.9    Helicobacter pylori antibody positive R76.8    DLBCL (diffuse large B cell lymphoma) (HCC) C83.30         Assessment:     1. Diffuse large B-cell lymphoma of lymph nodes of lower extremity (HCC)    2. Primary osteoarthritis of both knees    3. PUD (peptic ulcer disease)    4. Diabetes 1.5, managed as type 2 ([address]    5. Mass of leg, right        Plan:     Continue R mini CHOP  We  will check the laboratory tests including iron studies. Follow-up visit in 3weeks. Patient and her daughter had opportunity to ask excellent questions and we answered these  Patient and her daughter are in agreement with the plan above       No orders of the defined types were placed in this encounter.       Danielle Murray MD  9/12/2022

## 2022-09-16 ENCOUNTER — HOSPITAL ENCOUNTER (OUTPATIENT)
Dept: INFUSION THERAPY | Age: 72
Discharge: HOME | End: 2022-09-16

## 2022-09-16 VITALS
RESPIRATION RATE: 16 BRPM | BODY MASS INDEX: 33.47 KG/M2 | WEIGHT: 166 LBS | SYSTOLIC BLOOD PRESSURE: 171 MMHG | TEMPERATURE: 97.8 F | HEIGHT: 59 IN | OXYGEN SATURATION: 97 % | HEART RATE: 68 BPM | DIASTOLIC BLOOD PRESSURE: 82 MMHG

## 2022-09-16 LAB
25(OH)D3 SERPL-MCNC: 45.6 NG/ML (ref 30–100)
ALBUMIN SERPL-MCNC: 3.2 G/DL (ref 3.4–5)
ALBUMIN/GLOB SERPL: 1.1 {RATIO} (ref 0.8–1.7)
ALP SERPL-CCNC: 83 U/L (ref 45–117)
ALT SERPL-CCNC: 32 U/L (ref 13–56)
ANION GAP SERPL CALC-SCNC: 7 MMOL/L (ref 3–18)
AST SERPL-CCNC: 20 U/L (ref 10–38)
BASOPHILS # BLD: 0 K/UL (ref 0–0.1)
BASOPHILS NFR BLD: 1 % (ref 0–2)
BILIRUB SERPL-MCNC: 0.3 MG/DL (ref 0.2–1)
BUN SERPL-MCNC: 13 MG/DL (ref 7–18)
BUN/CREAT SERPL: 19 (ref 12–20)
CALCIUM SERPL-MCNC: 8.9 MG/DL (ref 8.5–10.1)
CHLORIDE SERPL-SCNC: 105 MMOL/L (ref 100–111)
CO2 SERPL-SCNC: 26 MMOL/L (ref 21–32)
CREAT SERPL-MCNC: 0.68 MG/DL (ref 0.6–1.3)
DIFFERENTIAL METHOD BLD: ABNORMAL
EOSINOPHIL # BLD: 0.1 K/UL (ref 0–0.4)
EOSINOPHIL NFR BLD: 4 % (ref 0–5)
ERYTHROCYTE [DISTWIDTH] IN BLOOD BY AUTOMATED COUNT: 16.5 % (ref 11.6–14.5)
FERRITIN SERPL-MCNC: 93 NG/ML (ref 8–388)
FOLATE SERPL-MCNC: 8.4 NG/ML (ref 3.1–17.5)
GLOBULIN SER CALC-MCNC: 3 G/DL (ref 2–4)
GLUCOSE SERPL-MCNC: 189 MG/DL (ref 74–99)
HCT VFR BLD AUTO: 32.3 % (ref 35–45)
HGB BLD-MCNC: 10.4 G/DL (ref 12–16)
IMM GRANULOCYTES # BLD AUTO: 0 K/UL
IMM GRANULOCYTES NFR BLD AUTO: 0 %
IRON SATN MFR SERPL: 20 % (ref 20–50)
IRON SERPL-MCNC: 63 UG/DL (ref 50–175)
LDH SERPL L TO P-CCNC: 208 U/L (ref 81–234)
LYMPHOCYTES # BLD: 0.7 K/UL (ref 0.9–3.6)
LYMPHOCYTES NFR BLD: 20 % (ref 21–52)
MCH RBC QN AUTO: 26.6 PG (ref 24–34)
MCHC RBC AUTO-ENTMCNC: 32.2 G/DL (ref 31–37)
MCV RBC AUTO: 82.6 FL (ref 78–100)
MONOCYTES # BLD: 0.6 K/UL (ref 0.05–1.2)
MONOCYTES NFR BLD: 17 % (ref 3–10)
NEUTS BAND NFR BLD MANUAL: 2 % (ref 0–5)
NEUTS SEG # BLD: 2.2 K/UL (ref 1.8–8)
NEUTS SEG NFR BLD: 56 % (ref 40–73)
NRBC # BLD: 0 K/UL (ref 0–0.01)
NRBC BLD-RTO: 0 PER 100 WBC
PLATELET # BLD AUTO: 242 K/UL (ref 135–420)
PLATELET COMMENTS,PCOM: ABNORMAL
PMV BLD AUTO: 9.3 FL (ref 9.2–11.8)
POTASSIUM SERPL-SCNC: 3.8 MMOL/L (ref 3.5–5.5)
PROT SERPL-MCNC: 6.2 G/DL (ref 6.4–8.2)
RBC # BLD AUTO: 3.91 M/UL (ref 4.2–5.3)
RBC MORPH BLD: ABNORMAL
SODIUM SERPL-SCNC: 138 MMOL/L (ref 136–145)
TIBC SERPL-MCNC: 308 UG/DL (ref 250–450)
VIT B12 SERPL-MCNC: >2000 PG/ML (ref 211–911)
WBC # BLD AUTO: 3.6 K/UL (ref 4.6–13.2)

## 2022-09-16 PROCEDURE — 80053 COMPREHEN METABOLIC PANEL: CPT

## 2022-09-16 PROCEDURE — 83615 LACTATE (LD) (LDH) ENZYME: CPT

## 2022-09-16 PROCEDURE — 36591 DRAW BLOOD OFF VENOUS DEVICE: CPT

## 2022-09-16 PROCEDURE — 77030012965 HC NDL HUBR BBMI -A

## 2022-09-16 PROCEDURE — 82728 ASSAY OF FERRITIN: CPT

## 2022-09-16 PROCEDURE — 82607 VITAMIN B-12: CPT

## 2022-09-16 PROCEDURE — 82306 VITAMIN D 25 HYDROXY: CPT

## 2022-09-16 PROCEDURE — 85025 COMPLETE CBC W/AUTO DIFF WBC: CPT

## 2022-09-16 PROCEDURE — 83540 ASSAY OF IRON: CPT

## 2022-09-16 RX ORDER — SODIUM CHLORIDE 0.9 % (FLUSH) 0.9 %
5-10 SYRINGE (ML) INJECTION AS NEEDED
Status: DISCONTINUED | OUTPATIENT
Start: 2022-09-16 | End: 2022-09-18 | Stop reason: HOSPADM

## 2022-09-16 RX ORDER — HEPARIN 100 UNIT/ML
500 SYRINGE INTRAVENOUS AS NEEDED
Status: DISCONTINUED | OUTPATIENT
Start: 2022-09-16 | End: 2022-09-18 | Stop reason: HOSPADM

## 2022-09-16 NOTE — PROGRESS NOTES
[facility] Progress Note    Date: 2022    Name: Rowena Munoz              MRN: [id number]              : 1950    Pre-Chemo labs    Ms. Rach Maier was assessed and education was provided. Ms. Rach Maier and daughter in today for pre-chemo labs. She denies any issues or complaints. Ms. Rach Maier verbalized understanding. Ms. Cervantes Bend vitals were reviewed. Visit Vitals  BP (!) 171/82 (BP 1 Location: Left arm, BP Patient Position: Sitting)   Pulse 68   Temp 97.8 °F (36.6 °C)   Resp 16   Ht 4' 11\" (1.499 m)   Wt 75.3 kg (166 lb)   SpO2 97%   Breastfeeding No   BMI 33.53 kg/m²       Right chest medi-port accessed and blood drawn per orders, per protocol without complications. Port flushed and de-accessed per orders, per protocol without complications. Band aid applied to site     Ms. Rach Maier tolerated the blood draw, and had no complaints at this time. Ms. Rach Maier was discharged from [place] [address] in stable condition at [address] [place] [address]. She is to return on 22 for her next appointment. Armband removed and shredded.     Mayito Dooley RN  2022  9:29 AM

## 2022-09-19 ENCOUNTER — HOSPITAL ENCOUNTER (OUTPATIENT)
Dept: INFUSION THERAPY | Age: 72
Discharge: HOME | End: 2022-09-19

## 2022-09-19 ENCOUNTER — APPOINTMENT (OUTPATIENT)
Dept: INFUSION THERAPY | Age: 72
End: 2022-09-19

## 2022-09-19 VITALS
OXYGEN SATURATION: 100 % | TEMPERATURE: 97.7 F | HEART RATE: 82 BPM | DIASTOLIC BLOOD PRESSURE: 72 MMHG | RESPIRATION RATE: 16 BRPM | SYSTOLIC BLOOD PRESSURE: 136 MMHG

## 2022-09-19 DIAGNOSIS — R22.41 LUMP OF SKIN OF RIGHT LOWER EXTREMITY: ICD-10-CM

## 2022-09-19 DIAGNOSIS — C83.30 DIFFUSE LARGE B-CELL LYMPHOMA, UNSPECIFIED BODY REGION (HCC): Primary | ICD-10-CM

## 2022-09-19 PROCEDURE — 96413 CHEMO IV INFUSION 1 HR: CPT

## 2022-09-19 PROCEDURE — 74011250637 HC RX REV CODE- 250/637: Performed by: INTERNAL MEDICINE

## 2022-09-19 PROCEDURE — 96411 CHEMO IV PUSH ADDL DRUG: CPT

## 2022-09-19 PROCEDURE — 96377 APPLICATON ON-BODY INJECTOR: CPT

## 2022-09-19 PROCEDURE — 74011250636 HC RX REV CODE- 250/636: Performed by: INTERNAL MEDICINE

## 2022-09-19 PROCEDURE — 96375 TX/PRO/DX INJ NEW DRUG ADDON: CPT

## 2022-09-19 PROCEDURE — 96367 TX/PROPH/DG ADDL SEQ IV INF: CPT

## 2022-09-19 PROCEDURE — 96417 CHEMO IV INFUS EACH ADDL SEQ: CPT

## 2022-09-19 PROCEDURE — 74011000250 HC RX REV CODE- 250: Performed by: INTERNAL MEDICINE

## 2022-09-19 PROCEDURE — 77030012965 HC NDL HUBR BBMI -A

## 2022-09-19 PROCEDURE — 96415 CHEMO IV INFUSION ADDL HR: CPT

## 2022-09-19 PROCEDURE — 74011000258 HC RX REV CODE- 258: Performed by: INTERNAL MEDICINE

## 2022-09-19 RX ORDER — DOXORUBICIN HYDROCHLORIDE 2 MG/ML
25 INJECTION, SOLUTION INTRAVENOUS ONCE
Status: COMPLETED | OUTPATIENT
Start: 2022-09-19 | End: 2022-09-19

## 2022-09-19 RX ORDER — ACETAMINOPHEN 325 MG/1
650 TABLET ORAL ONCE
Status: COMPLETED | OUTPATIENT
Start: 2022-09-19 | End: 2022-09-19

## 2022-09-19 RX ORDER — SODIUM CHLORIDE 9 MG/ML
100 INJECTION, SOLUTION INTRAVENOUS CONTINUOUS
Status: DISPENSED | OUTPATIENT
Start: 2022-09-19 | End: 2022-09-19

## 2022-09-19 RX ORDER — HEPARIN 100 UNIT/ML
300-500 SYRINGE INTRAVENOUS AS NEEDED
Status: DISPENSED | OUTPATIENT
Start: 2022-09-19 | End: 2022-09-19

## 2022-09-19 RX ORDER — DIPHENHYDRAMINE HYDROCHLORIDE 50 MG/ML
50 INJECTION, SOLUTION INTRAMUSCULAR; INTRAVENOUS ONCE
Status: COMPLETED | OUTPATIENT
Start: 2022-09-19 | End: 2022-09-19

## 2022-09-19 RX ORDER — SODIUM CHLORIDE 9 MG/ML
25 INJECTION, SOLUTION INTRAVENOUS CONTINUOUS
Status: DISPENSED | OUTPATIENT
Start: 2022-09-19 | End: 2022-09-19

## 2022-09-19 RX ORDER — SODIUM CHLORIDE 9 MG/ML
10 INJECTION INTRAMUSCULAR; INTRAVENOUS; SUBCUTANEOUS AS NEEDED
Status: ACTIVE | OUTPATIENT
Start: 2022-09-19 | End: 2022-09-19

## 2022-09-19 RX ORDER — SODIUM CHLORIDE 0.9 % (FLUSH) 0.9 %
10 SYRINGE (ML) INJECTION AS NEEDED
Status: DISPENSED | OUTPATIENT
Start: 2022-09-19 | End: 2022-09-19

## 2022-09-19 RX ORDER — PALONOSETRON 0.05 MG/ML
0.25 INJECTION, SOLUTION INTRAVENOUS ONCE
Status: COMPLETED | OUTPATIENT
Start: 2022-09-19 | End: 2022-09-19

## 2022-09-19 RX ADMIN — DOXORUBICIN HYDROCHLORIDE 44 MG: 2 INJECTION, SOLUTION INTRAVENOUS at 12:55

## 2022-09-19 RX ADMIN — DIPHENHYDRAMINE HYDROCHLORIDE 50 MG: 50 INJECTION, SOLUTION INTRAMUSCULAR; INTRAVENOUS at 09:00

## 2022-09-19 RX ADMIN — FOSAPREPITANT 150 MG: 150 INJECTION, POWDER, LYOPHILIZED, FOR SOLUTION INTRAVENOUS at 09:03

## 2022-09-19 RX ADMIN — ACETAMINOPHEN 650 MG: 325 TABLET ORAL at 08:55

## 2022-09-19 RX ADMIN — SODIUM CHLORIDE 100 ML/HR: 0.9 INJECTION, SOLUTION INTRAVENOUS at 09:46

## 2022-09-19 RX ADMIN — HEPARIN 500 UNITS: 100 SYRINGE at 13:18

## 2022-09-19 RX ADMIN — SODIUM CHLORIDE, PRESERVATIVE FREE 10 ML: 5 INJECTION INTRAVENOUS at 13:10

## 2022-09-19 RX ADMIN — SODIUM CHLORIDE 660 MG: 0.9 INJECTION, SOLUTION INTRAVENOUS at 09:57

## 2022-09-19 RX ADMIN — SODIUM CHLORIDE 25 ML/HR: 0.9 INJECTION, SOLUTION INTRAVENOUS at 08:44

## 2022-09-19 RX ADMIN — VINCRISTINE SULFATE 1 MG: 1 INJECTION, SOLUTION INTRAVENOUS at 12:45

## 2022-09-19 RX ADMIN — CYCLOPHOSPHAMIDE 700 MG: 200 INJECTION, SOLUTION INTRAVENOUS at 12:00

## 2022-09-19 RX ADMIN — PEGFILGRASTIM 6 MG: KIT SUBCUTANEOUS at 13:34

## 2022-09-19 RX ADMIN — PALONOSETRON 0.25 MG: 0.05 INJECTION, SOLUTION INTRAVENOUS at 08:57

## 2022-09-19 RX ADMIN — SODIUM CHLORIDE 500 ML: 0.9 INJECTION, SOLUTION INTRAVENOUS at 08:45

## 2022-09-19 NOTE — PROGRESS NOTES
[facility] Progress Note    Date: 2022    Name: Deejay Burdick              MRN: [id number]              : 1950    Chemotherapy Cycle: C5D1    R-chop (Rituxan,Cytoxan, Adriamycin, Vincristine)     Prednisone po (Home days 1-5)    Ms. Linda Mcknight was assessed and education was provided. Ms. Linda Mcknight arrived ambulatory using cane accompanied by her daughter. Translation services being used via ipad. Treatment education reviewed with Ms. Linda Mcknight and daughter. Pt reports to be in good spirits and daughter educating her on foods rich in iron. Ms. Margaret Baeza vitals were reviewed. Visit Vitals  /72 (BP 1 Location: Left arm)   Pulse 82   Temp 97.7 °F (36.5 °C)   Resp 16   SpO2 100%       Lab results were obtained and reviewed dated 2022. EF 60-65 % on echo dated 22. Mediport accessed with 20 gauge 0.75 inch needle without complications. Port flushes without resistance and positive brisk blood return noted. Treatment initiated per orders. NS  ml bolus initiated and completed. NS infusing IV 100ml/hr per order. Pre-medications (Tylenol 650 mg po, Benadryl 50 mg IV) were administered as ordered. Rituxan 660 mg was initiated as ordered at 100 mg/hr (50 ml/hr) for 30 minutes, increased by 100mg/hr (50ml/hr) every 30 minutes to max rate of  400 mg/hr (200mg/hr) per orders without complications. Pt tolerates well, is resting comfortably in stretcher. VS remain stable. Port flushed and blood return reverified. Pre-medications (Emend 150 mg IV, Decadron, Aloxi 0.25 mg IV) were administered as ordered and after 30 minutes next chemo initiated per orders. Port flushed and blood return re-verified. Cytoxan 700 mg IV was infused at 557 ml/hr without complications or reaction. Port flushed and blood return reverified. Adriamycin 44 mg IV push over 10 minutes per protocol without complications or s/s of reaction.   Blood return verified throughout IVP. Port flushed and blood return reverified. Vincristine 1 mg IV was infused @ 300 ml/hr per orders without complications or s/s of reaction. Port flushed and blood return re-verified. Neulasta 6 mg on-body injector applied to patient's abdomen. Instructions reviewed with patient and daughter on monitoring and when to remove device. Patient and daughter verbalized understanding. Port flushed and de-accessed per orders, per protocol without complications. Band aid applied to site. Ms. Marcelo Arthur tolerated infusions, and had no complaints at this time. Ms. Marcelo Arthur was discharged from [place] [address] in stable condition at 1340. She is to return on 10/0322 for her next appointment.     Yann Jewell RN  September 19, 2022  11:13 AM

## 2022-10-03 ENCOUNTER — OFFICE VISIT (OUTPATIENT)
Age: 72
End: 2022-10-03

## 2022-10-03 VITALS
HEIGHT: 59 IN | DIASTOLIC BLOOD PRESSURE: 73 MMHG | RESPIRATION RATE: 16 BRPM | SYSTOLIC BLOOD PRESSURE: 131 MMHG | WEIGHT: 164.6 LBS | TEMPERATURE: 97.6 F | HEART RATE: 83 BPM | OXYGEN SATURATION: 100 % | BODY MASS INDEX: 33.18 KG/M2

## 2022-10-03 DIAGNOSIS — M17.0 PRIMARY OSTEOARTHRITIS OF BOTH KNEES: ICD-10-CM

## 2022-10-03 DIAGNOSIS — K43.9 HERNIA OF ABDOMINAL WALL: ICD-10-CM

## 2022-10-03 DIAGNOSIS — D24.2 FIBROADENOMA OF BREAST, LEFT: ICD-10-CM

## 2022-10-03 DIAGNOSIS — K27.9 PUD (PEPTIC ULCER DISEASE): ICD-10-CM

## 2022-10-03 DIAGNOSIS — C83.35 DIFFUSE LARGE B-CELL LYMPHOMA OF LYMPH NODES OF LOWER EXTREMITY (HCC): Primary | ICD-10-CM

## 2022-10-03 DIAGNOSIS — E53.8 VITAMIN B 12 DEFICIENCY: ICD-10-CM

## 2022-10-03 DIAGNOSIS — R22.41 MASS OF LEG, RIGHT: ICD-10-CM

## 2022-10-03 DIAGNOSIS — D50.8 OTHER IRON DEFICIENCY ANEMIA: ICD-10-CM

## 2022-10-03 DIAGNOSIS — E13.9 DIABETES 1.5, MANAGED AS TYPE 2 (HCC): ICD-10-CM

## 2022-10-03 DIAGNOSIS — E55.9 VITAMIN D DEFICIENCY: ICD-10-CM

## 2022-10-03 PROCEDURE — 1123F ACP DISCUSS/DSCN MKR DOCD: CPT | Performed by: INTERNAL MEDICINE

## 2022-10-03 PROCEDURE — 99214 OFFICE O/P EST MOD 30 MIN: CPT | Performed by: INTERNAL MEDICINE

## 2022-10-03 PROCEDURE — 3051F HG A1C>EQUAL 7.0%<8.0%: CPT | Performed by: INTERNAL MEDICINE

## 2022-10-03 NOTE — PROGRESS NOTES
Hematology/Oncology  Progress Note    Name: Andreea Armas  Date: 10/3/2022  : 1950  Primary Care Provider: Yudith Hester NP    Ms. Tati Grove is a 74y.o. year old female with Diffuse Large B-cell lymphoma. ,   left breast fibroadenoma as per biopsy,  progrssivley enlarging  abdominal hernia repaired     New PORT on right chest is  well positioned by my palpation     Right medial proximal leg adenopathy much smaller smaller one again. No longer palpable      Current Therapy:   R mini CHOP  now C5 will have  6 cycles                                  After cycle 6 continue single agent retixumab every 3 weeks 375 mg /msq for 18 cyclel  Subjective: The past medical, surgical and social history has been reviewed and remains unchanged.    Past Medical History:   Diagnosis Date    Arthritis     Diabetes ([facility] [address]     Hypertension     Lymphoma ([facility] [address]     Right leg     Past Surgical History:   Procedure Laterality Date    COLONOSCOPY N/A 2022    COLONOSCOPY performed by Archie Ibrahim MD at [facility] ENDOSCOPY    HX BACK SURGERY      HX  SECTION      HX CHOLECYSTECTOMY      2022    HX ENDOSCOPY      EGD 2022    HX HERNIA REPAIR      HX HYSTERECTOMY      IR BX BONE MARROW DIAGNOSTIC  2022    IR INSERT TUNL CVC W PORT OVER 5 YEARS  2022    IR REPAIR CVAD W PORT / PUMP  2022     Social History     Socioeconomic History    Marital status:      Spouse name: Not on file    Number of children: Not on file    Years of education: Not on file    Highest education level: Not on file   Occupational History    Not on file   Tobacco Use    Smoking status: Never    Smokeless tobacco: Never   Vaping Use    Vaping Use: Never used   Substance and Sexual Activity    Alcohol use: Never    Drug use: Never    Sexual activity: Not on file   Other Topics Concern    Not on file   Social History Narrative    Not on file     Social Determinants of Health     Financial Resource Strain: Not on file   Food Insecurity: Not on file   Transportation Needs: Not on file   Physical Activity: Not on file   Stress: Not on file   Social Connections: Not on file   Intimate Partner Violence: Not on file   Housing Stability: Not on file     Family History   Problem Relation Age of Onset    Stroke Mother     Cancer Sister     Diabetes Sister     Hypertension Sister     Dementia Brother      Current Outpatient Medications   Medication Sig Dispense Refill    omeprazole (PRILOSEC) 40 mg capsule Take 1 Capsule by mouth two (2) times a day for 360 days. Indications: gastroesophageal reflux disease, patient is on high dose prednisone for her lymphoma 180 Capsule 3    oxyCODONE-acetaminophen (Percocet) 5-325 mg per tablet Take 1 Tablet by mouth every four (4) hours as needed for Pain for up to 7 days. Max Daily Amount: 6 Tablets. Indications: pain, severe headache when on the high dose prednison 40 Tablet 0    glyBURIDE (DIABETA) 1.25 mg tablet Take 1 Tablet by mouth Daily (before breakfast). 30 Tablet 1    lidocaine-prilocaine (EMLA) topical cream Apply to port 1/2 hour prior to needle insertion 30 g 5    prochlorperazine (COMPAZINE) 10 mg tablet Take 0.5 Tablets by mouth every six (6) hours as needed for Nausea or Vomiting for up to 180 days. Indications: nausea and vomiting caused by cancer drugs (Patient not taking: No sig reported) 40 Tablet 5    predniSONE (DELTASONE) 10 mg tablet Take 70 mg by mouth daily for 30 days. Indications: diffuse large B-cell lymphoma (Patient not taking: Reported on 6/28/2022) 35 Tablet 5    therapeutic multivitamin (THERAGRAN) tablet Take 1 Tablet by mouth daily. losartan (COZAAR) 50 mg tablet Take 1 Tablet by mouth two (2) times a day. 30 Tablet 2    verapamiL (CALAN) 80 mg tablet Take 1 Tablet by mouth two (2) times a day. 30 Tablet 2    metFORMIN (GLUCOPHAGE) 850 mg tablet Take 1 Tablet by mouth two (2) times a day. 30 Tablet 2    OTHER Take 5 mg by mouth nightly.  glibenclamida - diabetes from [place]      OTHER Take 2 mg by mouth nightly. tolterodina - bladder - from [place] (Patient not taking: No sig reported)      indomethacin (INDOCIN) 25 mg capsule Take 25 mg by mouth two (2) times a day. Facility-Administered Medications Ordered in Other Visits   Medication Dose Route Frequency Provider Last Rate Last Admin    0.9% sodium chloride infusion  20 mL/hr IntraVENous CONTINUOUS Hans Morrell MD 20 mL/hr at 04/25/22 0924 20 mL/hr at 04/25/22 0924    heparin (porcine) 100 unit/mL injection 500 Units  500 Units InterCATHeter PRN Duarte Burdick MD   500 Units at 04/25/22 1024       Review of Systems:    General :The patient has no new complaints except for nausea and fatigue    Psychological : patient denies having any psychological symptoms such as hallucinations depression or anxiety. Ophthalmic:the patient denies having any visual impairment or eye discomfort. ENT: there are no abnormalities reported. Allergy and Immunology:the patient denies having any seasonal allergies or allergies to medications other than those already outlined above. Hematological and Lymphatic: the patient denies having any bruising, bleeding or lymphadenopathy. Endocrine: the patient denies having any heat or cold intolerance. There is no history of diabetes or thyroid disorders. Breast: the patient denies having any history of breast mass or lumps. Respiratory:the patient denies having any cough, shortness of breath, or dyspnea on exertion. Cardiovascular: there are no complaints of chest pain, palpitations, chest pounding, or dyspnea on exertion. Gastrointestinal: the patient denies having nausea, emesis, diarrhea, constipation, or blood in the stool. Genito-Urinary: the patient denies having urinary urgency, frequency, or dysuria. Musculoskeletal: with the exception of mild arthralgias the patient has no other musculoskeletal complaints.      Neurological: denies having any numbness, tingling, or neurologic deficits. Dermatological: patient denies having any unexplained rash, skin ulcerations, or hives. Objective:     Visit Vitals  /73   Pulse 83   Temp 97.6 °F (36.4 °C)   Resp 16   Ht 4' 11\" (1.499 m)   Wt 74.7 kg (164 lb 9.6 oz)   SpO2 100%   BMI 33.25 kg/m²     Pain Score: 2    Physical Exam: Chaperone, daughter    ECO    General: Well appearing, in NAD    Psychologic: mood and affect are appropriate, no anxiety or depression noted    Skin: examination of the skin reveals no bruising, rash or petechiae    HEENT: Normocephalic, atraumatic. Conjunctiva and sclera are clear. Pupils are equal, round and reactive to light. EOMs are intact. Neck: supple without lymphadenopathy, JVD or thyromegaly    Lymphatics: no palpable cervical, supraclavicular, infraclavicular, axillary or inguinal lymphadenopathy    Lungs: clear breath sounds bilaterally, no rhonchi or wheezes noted    Heart: Regular rate and rhythm,  S1-S2 noted. Positive peripheral pulses bilaterally upper and lower extremities    Abdomen: soft, non-tender, non-distended, no HSM,     Extremities: without clubbing, cyanosis or edema    Neurologic: no focal deficits, steady gait, Alert and oriented x 3. Laboratory Data:     Results for orders placed or performed during the hospital encounter of 22   CBC WITH 3 PART DIFF     Status: Abnormal   Result Value Ref Range Status    WBC 5.4 4.5 - 13.0 K/uL Final    RBC 4.23 4. 10 - 5.10 M/uL Final    HGB 11.2 (L) 12.0 - 16.0 g/dL Final    HCT 36.3 36 - 48 % Final    MCV 85.8 78 - 102 FL Final    MCH 26.5 25.0 - 35.0 PG Final    MCHC 30.9 (L) 31 - 37 g/dL Final    RDW 13.0 11.5 - 14.5 % Final    PLATELET 571 182 - 539 K/uL Final    NEUTROPHILS 71 (H) 40 - 70 % Final    Mixed cells 14 0.1 - 17 % Final    LYMPHOCYTES 15 14 - 44 % Final    ABS. NEUTROPHILS 3.8 1.8 - 9.5 K/UL Final    ABS. MIXED CELLS 0.8 0.0 - 2.3 K/uL Final    ABS.  LYMPHOCYTES 0.8 (L) 1.1 - 5.9 K/UL Final     Comment: Test performed at [address] [place] [address] or Outpatient Infusion Center Location. Reviewed by Medical Director. DF AUTOMATED   Final       Patient Active Problem List   Diagnosis Code    Lump of skin of right lower extremity R22.41    Primary osteoarthritis of both knees M17.0    Diabetes mellitus without complication (HCC) N88.2    Helicobacter pylori antibody positive R76.8    DLBCL (diffuse large B cell lymphoma) (HCC) C83.30         Assessment:     1. Diffuse large B-cell lymphoma of lymph nodes of lower extremity (HCC)    2. Primary osteoarthritis of both knees    3. PUD (peptic ulcer disease)    4. Diabetes 1.5, managed as type 2 ([address]    5. Mass of leg, right    6. Vitamin D deficiency    7. Other iron deficiency anemia    8. Vitamin B 12 deficiency    9. Fibroadenoma of breast, left    10. Hernia of abdominal wall        Plan:     Continue R mini CHOP another cycle  POST R mini CHOP continue single agent Rituximab 375mg/msq every 3 weeks for 18 cycles as patient has a luis miguel risk lymphoma  ECHO and PET/CT in a month  Follow-up visit in 6 weeks. Patient and her daughter had opportunity to ask excellent questions and we answered these  Patient and her daughter are in agreement with the plan above    Follow-up and Dispositions    Return in about 6 weeks (around 11/14/2022) for Follow_up In Person. No orders of the defined types were placed in this encounter.       Jennifer Velasco MD  10/3/2022

## 2022-10-03 NOTE — Clinical Note
Patient will have last cycle of RminiCHOP next week Three weeks later she will continue Rituxan as single agent every 3 weeks for 18 cycles. Rituximab  375 mg /msq. All infusions at [facility].

## 2022-10-04 RX ORDER — DIPHENHYDRAMINE HYDROCHLORIDE 50 MG/ML
50 INJECTION, SOLUTION INTRAMUSCULAR; INTRAVENOUS AS NEEDED
Status: CANCELLED
Start: 2022-10-10

## 2022-10-04 RX ORDER — ONDANSETRON 2 MG/ML
8 INJECTION INTRAMUSCULAR; INTRAVENOUS AS NEEDED
Status: CANCELLED | OUTPATIENT
Start: 2022-10-10

## 2022-10-04 RX ORDER — ACETAMINOPHEN 325 MG/1
650 TABLET ORAL AS NEEDED
Status: CANCELLED
Start: 2022-10-10

## 2022-10-04 RX ORDER — DIPHENHYDRAMINE HYDROCHLORIDE 50 MG/ML
25 INJECTION, SOLUTION INTRAMUSCULAR; INTRAVENOUS AS NEEDED
Status: CANCELLED
Start: 2022-10-10

## 2022-10-04 RX ORDER — EPINEPHRINE 1 MG/ML
0.3 INJECTION, SOLUTION, CONCENTRATE INTRAVENOUS AS NEEDED
Status: CANCELLED | OUTPATIENT
Start: 2022-10-10

## 2022-10-04 RX ORDER — ALBUTEROL SULFATE 0.83 MG/ML
2.5 SOLUTION RESPIRATORY (INHALATION) AS NEEDED
Status: CANCELLED
Start: 2022-10-10

## 2022-10-04 RX ORDER — HYDROCORTISONE SODIUM SUCCINATE 100 MG/2ML
100 INJECTION, POWDER, FOR SOLUTION INTRAMUSCULAR; INTRAVENOUS AS NEEDED
Status: CANCELLED | OUTPATIENT
Start: 2022-10-10

## 2022-10-07 ENCOUNTER — HOSPITAL ENCOUNTER (OUTPATIENT)
Dept: INFUSION THERAPY | Age: 72
Discharge: HOME | End: 2022-10-07

## 2022-10-07 VITALS
HEIGHT: 59 IN | WEIGHT: 165.3 LBS | BODY MASS INDEX: 33.32 KG/M2 | DIASTOLIC BLOOD PRESSURE: 72 MMHG | SYSTOLIC BLOOD PRESSURE: 147 MMHG | TEMPERATURE: 98.4 F | HEART RATE: 80 BPM | RESPIRATION RATE: 16 BRPM | OXYGEN SATURATION: 100 %

## 2022-10-07 DIAGNOSIS — R22.41 LUMP OF SKIN OF RIGHT LOWER EXTREMITY: Primary | ICD-10-CM

## 2022-10-07 LAB
ALBUMIN SERPL-MCNC: 3.2 G/DL (ref 3.4–5)
ALBUMIN/GLOB SERPL: 1.1 {RATIO} (ref 0.8–1.7)
ALP SERPL-CCNC: 82 U/L (ref 45–117)
ALT SERPL-CCNC: 39 U/L (ref 13–56)
ANION GAP SERPL CALC-SCNC: 10 MMOL/L (ref 3–18)
AST SERPL-CCNC: 25 U/L (ref 10–38)
BASO+EOS+MONOS # BLD AUTO: 1 K/UL (ref 0–2.3)
BASO+EOS+MONOS NFR BLD AUTO: 20 % (ref 0.1–17)
BILIRUB SERPL-MCNC: 0.4 MG/DL (ref 0.2–1)
BUN SERPL-MCNC: 12 MG/DL (ref 7–18)
BUN/CREAT SERPL: 15 (ref 12–20)
CALCIUM SERPL-MCNC: 9.1 MG/DL (ref 8.5–10.1)
CHLORIDE SERPL-SCNC: 100 MMOL/L (ref 100–111)
CO2 SERPL-SCNC: 26 MMOL/L (ref 21–32)
CREAT SERPL-MCNC: 0.78 MG/DL (ref 0.6–1.3)
DIFFERENTIAL METHOD BLD: ABNORMAL
ERYTHROCYTE [DISTWIDTH] IN BLOOD BY AUTOMATED COUNT: 15.6 % (ref 11.5–14.5)
GLOBULIN SER CALC-MCNC: 3 G/DL (ref 2–4)
GLUCOSE SERPL-MCNC: 215 MG/DL (ref 74–99)
HCT VFR BLD AUTO: 33.4 % (ref 36–48)
HGB BLD-MCNC: 10.8 G/DL (ref 12–16)
LYMPHOCYTES # BLD: 0.6 K/UL (ref 1.1–5.9)
LYMPHOCYTES NFR BLD: 13 % (ref 14–44)
MCH RBC QN AUTO: 27 PG (ref 25–35)
MCHC RBC AUTO-ENTMCNC: 32.3 G/DL (ref 31–37)
MCV RBC AUTO: 83.5 FL (ref 78–102)
NEUTS SEG # BLD: 3.2 K/UL (ref 1.8–9.5)
NEUTS SEG NFR BLD: 67 % (ref 40–70)
PLATELET # BLD AUTO: 251 K/UL (ref 140–440)
POTASSIUM SERPL-SCNC: 3.5 MMOL/L (ref 3.5–5.5)
PROT SERPL-MCNC: 6.2 G/DL (ref 6.4–8.2)
RBC # BLD AUTO: 4 M/UL (ref 4.1–5.1)
SODIUM SERPL-SCNC: 136 MMOL/L (ref 136–145)
WBC # BLD AUTO: 4.8 K/UL (ref 4.5–13)

## 2022-10-07 PROCEDURE — 74011250636 HC RX REV CODE- 250/636: Performed by: INTERNAL MEDICINE

## 2022-10-07 PROCEDURE — 36591 DRAW BLOOD OFF VENOUS DEVICE: CPT

## 2022-10-07 PROCEDURE — 74011000250 HC RX REV CODE- 250: Performed by: INTERNAL MEDICINE

## 2022-10-07 PROCEDURE — 85025 COMPLETE CBC W/AUTO DIFF WBC: CPT

## 2022-10-07 PROCEDURE — 80053 COMPREHEN METABOLIC PANEL: CPT

## 2022-10-07 PROCEDURE — 77030012965 HC NDL HUBR BBMI -A

## 2022-10-07 RX ORDER — SODIUM CHLORIDE 0.9 % (FLUSH) 0.9 %
5-10 SYRINGE (ML) INJECTION AS NEEDED
Status: DISCONTINUED | OUTPATIENT
Start: 2022-10-07 | End: 2022-10-09 | Stop reason: HOSPADM

## 2022-10-07 RX ORDER — HEPARIN 100 UNIT/ML
500 SYRINGE INTRAVENOUS ONCE
Status: COMPLETED | OUTPATIENT
Start: 2022-10-07 | End: 2022-10-07

## 2022-10-07 RX ADMIN — Medication 500 UNITS: at 09:25

## 2022-10-07 RX ADMIN — SODIUM CHLORIDE, PRESERVATIVE FREE 10 ML: 5 INJECTION INTRAVENOUS at 09:25

## 2022-10-07 NOTE — PROGRESS NOTES
[facility] Progress Note    Date: 2022    Name: Leodan Santiago              MRN: [id number]              : 1950  Pre-Chemo labs      Ms. Lavelle Arthur was assessed and education was provided. Ms. Amy Walters vitals were reviewed. Visit Vitals  BP (!) 147/72 (BP 1 Location: Left arm)   Pulse 80   Temp 98.4 °F (36.9 °C)   Resp 16   Ht 4' 11\" (1.499 m)   SpO2 100%   Breastfeeding No   BMI 33.25 kg/m²          Right chest medi-port accessed and blood drawn per orders, per protocol without complications. Port flushed and de-accessed per orders, per protocol without complications. Band aid applied to site     Ms. Lavelle Arthur tolerated the blood draw, and had no complaints at this time. Armband removed and shredded. Ms. Lavelle Arthur was discharged from [place] [address] in stable condition at 0930. She is to return on 10/10/22 at 0800 for her next appointment.     Katlyn Davis RN  2022  9:43 AM

## 2022-10-10 ENCOUNTER — HOSPITAL ENCOUNTER (OUTPATIENT)
Dept: INFUSION THERAPY | Age: 72
Discharge: HOME | End: 2022-10-10

## 2022-10-10 VITALS
RESPIRATION RATE: 17 BRPM | SYSTOLIC BLOOD PRESSURE: 152 MMHG | HEART RATE: 84 BPM | OXYGEN SATURATION: 100 % | TEMPERATURE: 98.3 F | DIASTOLIC BLOOD PRESSURE: 81 MMHG

## 2022-10-10 DIAGNOSIS — C83.30 DIFFUSE LARGE B-CELL LYMPHOMA, UNSPECIFIED BODY REGION (HCC): ICD-10-CM

## 2022-10-10 DIAGNOSIS — R22.41 LUMP OF SKIN OF RIGHT LOWER EXTREMITY: Primary | ICD-10-CM

## 2022-10-10 PROCEDURE — 74011000258 HC RX REV CODE- 258: Performed by: INTERNAL MEDICINE

## 2022-10-10 PROCEDURE — 96413 CHEMO IV INFUSION 1 HR: CPT

## 2022-10-10 PROCEDURE — 96411 CHEMO IV PUSH ADDL DRUG: CPT

## 2022-10-10 PROCEDURE — 96367 TX/PROPH/DG ADDL SEQ IV INF: CPT

## 2022-10-10 PROCEDURE — 96417 CHEMO IV INFUS EACH ADDL SEQ: CPT

## 2022-10-10 PROCEDURE — 96375 TX/PRO/DX INJ NEW DRUG ADDON: CPT

## 2022-10-10 PROCEDURE — 74011250637 HC RX REV CODE- 250/637: Performed by: INTERNAL MEDICINE

## 2022-10-10 PROCEDURE — 96377 APPLICATON ON-BODY INJECTOR: CPT

## 2022-10-10 PROCEDURE — 77030012965 HC NDL HUBR BBMI -A

## 2022-10-10 PROCEDURE — 74011000250 HC RX REV CODE- 250: Performed by: INTERNAL MEDICINE

## 2022-10-10 PROCEDURE — 74011250636 HC RX REV CODE- 250/636: Performed by: INTERNAL MEDICINE

## 2022-10-10 PROCEDURE — 96415 CHEMO IV INFUSION ADDL HR: CPT

## 2022-10-10 RX ORDER — ACETAMINOPHEN 325 MG/1
650 TABLET ORAL ONCE
Status: COMPLETED | OUTPATIENT
Start: 2022-10-10 | End: 2022-10-10

## 2022-10-10 RX ORDER — PALONOSETRON 0.05 MG/ML
0.25 INJECTION, SOLUTION INTRAVENOUS ONCE
Status: COMPLETED | OUTPATIENT
Start: 2022-10-10 | End: 2022-10-10

## 2022-10-10 RX ORDER — SODIUM CHLORIDE 9 MG/ML
100 INJECTION, SOLUTION INTRAVENOUS CONTINUOUS
Status: DISPENSED | OUTPATIENT
Start: 2022-10-10 | End: 2022-10-10

## 2022-10-10 RX ORDER — HEPARIN 100 UNIT/ML
300-500 SYRINGE INTRAVENOUS AS NEEDED
Status: DISPENSED | OUTPATIENT
Start: 2022-10-10 | End: 2022-10-10

## 2022-10-10 RX ORDER — DOXORUBICIN HYDROCHLORIDE 2 MG/ML
25 INJECTION, SOLUTION INTRAVENOUS ONCE
Status: COMPLETED | OUTPATIENT
Start: 2022-10-10 | End: 2022-10-10

## 2022-10-10 RX ORDER — DIPHENHYDRAMINE HYDROCHLORIDE 50 MG/ML
50 INJECTION, SOLUTION INTRAMUSCULAR; INTRAVENOUS ONCE
Status: COMPLETED | OUTPATIENT
Start: 2022-10-10 | End: 2022-10-10

## 2022-10-10 RX ORDER — SODIUM CHLORIDE 9 MG/ML
25 INJECTION, SOLUTION INTRAVENOUS CONTINUOUS
Status: DISPENSED | OUTPATIENT
Start: 2022-10-10 | End: 2022-10-10

## 2022-10-10 RX ORDER — SODIUM CHLORIDE 0.9 % (FLUSH) 0.9 %
10 SYRINGE (ML) INJECTION AS NEEDED
Status: DISPENSED | OUTPATIENT
Start: 2022-10-10 | End: 2022-10-10

## 2022-10-10 RX ORDER — SODIUM CHLORIDE 9 MG/ML
10 INJECTION INTRAMUSCULAR; INTRAVENOUS; SUBCUTANEOUS AS NEEDED
Status: ACTIVE | OUTPATIENT
Start: 2022-10-10 | End: 2022-10-10

## 2022-10-10 RX ADMIN — DIPHENHYDRAMINE HYDROCHLORIDE 50 MG: 50 INJECTION INTRAMUSCULAR; INTRAVENOUS at 08:43

## 2022-10-10 RX ADMIN — ACETAMINOPHEN 650 MG: 325 TABLET ORAL at 08:44

## 2022-10-10 RX ADMIN — SODIUM CHLORIDE 660 MG: 0.9 INJECTION, SOLUTION INTRAVENOUS at 10:15

## 2022-10-10 RX ADMIN — SODIUM CHLORIDE, PRESERVATIVE FREE 10 ML: 5 INJECTION INTRAVENOUS at 14:53

## 2022-10-10 RX ADMIN — DOXORUBICIN HYDROCHLORIDE 44 MG: 2 INJECTION, SOLUTION INTRAVENOUS at 14:17

## 2022-10-10 RX ADMIN — CYCLOPHOSPHAMIDE 700 MG: 200 INJECTION, SOLUTION INTRAVENOUS at 13:40

## 2022-10-10 RX ADMIN — PEGFILGRASTIM 6 MG: KIT SUBCUTANEOUS at 14:56

## 2022-10-10 RX ADMIN — Medication 500 UNITS: at 14:53

## 2022-10-10 RX ADMIN — SODIUM CHLORIDE 25 ML/HR: 0.9 INJECTION, SOLUTION INTRAVENOUS at 10:12

## 2022-10-10 RX ADMIN — VINCRISTINE SULFATE 1 MG: 1 INJECTION, SOLUTION INTRAVENOUS at 14:37

## 2022-10-10 RX ADMIN — FOSAPREPITANT 150 MG: 150 INJECTION, POWDER, LYOPHILIZED, FOR SOLUTION INTRAVENOUS at 12:41

## 2022-10-10 RX ADMIN — SODIUM CHLORIDE 500 ML: 0.9 INJECTION, SOLUTION INTRAVENOUS at 08:20

## 2022-10-10 RX ADMIN — PALONOSETRON 0.25 MG: 0.05 INJECTION, SOLUTION INTRAVENOUS at 13:07

## 2022-10-10 NOTE — PROGRESS NOTES
[facility] Progress Note    Date: October 10, 2022    Name: Hasmukh Nunez              MRN: [id number]              : 1950    Chemotherapy Cycle:C6D1     R-chop (Rituxan,Cytoxan, Adriamycin, Vincristine)           Prednisone po (Home days 1-5)    Ms. Andrew Feldman was assessed and education was provided. Ms. Andrew Feldman arrived ambulatory using cane accompanied by her daughter. Ms. Andrew Feldman speaks very little english per patient and daughter. Translation services being used via ipad. Treatment education reviewed with Ms. Andrew Feldman and daughter. Pt reports fatigue after previous treatment, adequate hydration and nutrition encouraged. Ms. Fay Arizmendi vitals were reviewed. Visit Vitals  BP (!) 152/81 (BP 1 Location: Left arm, BP Patient Position: At rest)   Pulse 84   Temp 98.3 °F (36.8 °C)   Resp 17   SpO2 100%     Lab results were obtained and reviewed dated 10/07/22. EF 60-65 % on echo dated 22. Troponin WNL. Mediport accessed with 20 gauge 0.75 inch needle without complications. Port flushes without resistance and positive brisk blood return noted. Paper tape and gauze applied as dressing, pt reports \"blistering and scabbing\" from previous dressing. Treatment initiated per orders. NS  ml bolus initiated. NS infusing IV 100ml/hr. Pre-medications (Tylenol 650 mg po, Benadryl 50 mg IV) were administered as ordered. .     Rituxan 660 mg was initiated at 100 mg/hr (50 ml/hr) for 30 minutes, increased by 50ml every 30 minutes to max rate of  200 ml/hr per orders without complications. Pt tolerates well, is resting comfortably. Port flushed and blood return reverified. Pre-medications (Emend 150 mg IV, Aloxi 0.25 mg IV) were administered as ordered and after 30 minutes next chemo initiated per orders. Cytoxan 700 mg IV was infused at 557 ml/hr without complications or reaction. Port flushed and blood return reverified.      Adriamycin 44 mg IV push over 10 minutes per orders without complications or s/s of reaction. Port flushed and blood return reverified. Vincristine 1 mg IV was infused @ 300 ml/hr per orders without complications or s/s of reaction. Neulasta 6 mg on-body injector applied to patient's. Instructions reviewed with patient and daughter on monitoring and when to remove device. Patient and daughter verbalized understanding. Port flushed and de-accessed per orders, per protocol without complications. Band aid applied to site. Ms. Tracy Palmer tolerated infusions, and had no complaints at this time. Ms. Tracy Palmer was discharged from [place] [address] in stable condition at 1515. She is to return on 10/28/22 at 0800 for her next appointment. Armband removed and shredded.     Alexandra Castaneda RN  October 10, 2022  8:58 AM

## 2022-10-17 ENCOUNTER — TELEPHONE (OUTPATIENT)
Age: 72
End: 2022-10-17

## 2022-10-17 NOTE — TELEPHONE ENCOUNTER
Patient's daughter called stating that the patient had chemo on 10/10, and has been unable to sleep. She is wondering if there is any way that her mother could be prescribed sleeping medication. Please advise.

## 2022-10-19 ENCOUNTER — TELEPHONE (OUTPATIENT)
Dept: FAMILY MEDICINE CLINIC | Facility: CLINIC | Age: 72
End: 2022-10-19

## 2022-10-19 ENCOUNTER — VIRTUAL VISIT (OUTPATIENT)
Dept: FAMILY MEDICINE CLINIC | Facility: CLINIC | Age: 72
End: 2022-10-19

## 2022-10-19 DIAGNOSIS — G47.9 DIFFICULTY SLEEPING: Primary | ICD-10-CM

## 2022-10-19 PROCEDURE — 99422 OL DIG E/M SVC 11-20 MIN: CPT | Performed by: CLINICAL NURSE SPECIALIST

## 2022-10-19 RX ORDER — TRAZODONE HYDROCHLORIDE 50 MG/1
50 TABLET ORAL
Qty: 30 TABLET | Refills: 1 | Status: SHIPPED | OUTPATIENT
Start: 2022-10-19

## 2022-10-19 NOTE — PROGRESS NOTES
Juve Bob (: 1950) is a 74 y.o. female, established patient, here for evaluation of the following chief complaint(s):   Sleep Problem       ASSESSMENT/PLAN:  Below is the assessment and plan developed based on review of pertinent history, labs, studies, and medications. 1. Difficulty sleeping    Trazodone 50 mg sent to pharmacy on file. Advised on use and safety as well as timeframe to see effect. Educated on sleep hygiene. Encouraged to call with questions or concerns. No follow-ups on file. SUBJECTIVE/OBJECTIVE:  Televisit with this patient to discuss sleep problem. For about the past 7 nights has had difficulty falling asleep. Has tried melatonin as well as a tea for sleep, but neither has helped. Has contacted oncology, but has not heard anything back yet. No other concerns at this time. Review of Systems   Constitutional: Negative. Respiratory: Negative. Cardiovascular: Negative. No data recorded     Physical Exam  Neurological:      Mental Status: She is alert and oriented to person, place, and time. On this date 10/19/2022 I have spent 15 minutes reviewing previous notes, test results and face to face (virtual) with the patient discussing the diagnosis and importance of compliance with the treatment plan as well as documenting on the day of the visit. Juve Bob, was evaluated through a synchronous (real-time) audio encounter. The patient (or guardian if applicable) is aware that this is a billable service, which includes applicable co-pays. This Virtual Visit was conducted with patient's (and/or legal guardian's) consent. The visit was conducted pursuant to the emergency declaration under the [address] [place] [address] authority and the [employer] and [employer] General Act. Patient identification was verified, and a caregiver was present when appropriate.   The patient was located at: [facility] [id number]  The provider was located at: Home: [unfilled]       [facility] [employer] [address] was used to authenticate this note.   -- Eva Fiore, NP

## 2022-10-20 NOTE — PROGRESS NOTES
I have reviewed the attatched progress note and I agree with the plan and medical decision making as outlined by Camilla Wilkerson MD

## 2022-10-25 RX ORDER — DIPHENHYDRAMINE HYDROCHLORIDE 50 MG/ML
50 INJECTION, SOLUTION INTRAMUSCULAR; INTRAVENOUS AS NEEDED
Status: CANCELLED
Start: 2022-10-31

## 2022-10-25 RX ORDER — EPINEPHRINE 1 MG/ML
0.3 INJECTION, SOLUTION, CONCENTRATE INTRAVENOUS AS NEEDED
Status: CANCELLED | OUTPATIENT
Start: 2022-10-31

## 2022-10-25 RX ORDER — ACETAMINOPHEN 325 MG/1
650 TABLET ORAL AS NEEDED
Status: CANCELLED
Start: 2022-10-31

## 2022-10-25 RX ORDER — DIPHENHYDRAMINE HYDROCHLORIDE 50 MG/ML
25 INJECTION, SOLUTION INTRAMUSCULAR; INTRAVENOUS AS NEEDED
Status: CANCELLED
Start: 2022-10-31

## 2022-10-25 RX ORDER — HYDROCORTISONE SODIUM SUCCINATE 100 MG/2ML
100 INJECTION, POWDER, FOR SOLUTION INTRAMUSCULAR; INTRAVENOUS AS NEEDED
Status: CANCELLED | OUTPATIENT
Start: 2022-10-31

## 2022-10-25 RX ORDER — ACETAMINOPHEN 325 MG/1
650 TABLET ORAL ONCE
Status: CANCELLED | OUTPATIENT
Start: 2022-10-31 | End: 2022-10-31

## 2022-10-25 RX ORDER — SODIUM CHLORIDE 9 MG/ML
10 INJECTION INTRAMUSCULAR; INTRAVENOUS; SUBCUTANEOUS AS NEEDED
Status: CANCELLED | OUTPATIENT
Start: 2022-10-31

## 2022-10-25 RX ORDER — SODIUM CHLORIDE 9 MG/ML
25 INJECTION, SOLUTION INTRAVENOUS CONTINUOUS
Status: CANCELLED | OUTPATIENT
Start: 2022-10-31

## 2022-10-25 RX ORDER — ONDANSETRON 2 MG/ML
8 INJECTION INTRAMUSCULAR; INTRAVENOUS AS NEEDED
Status: CANCELLED | OUTPATIENT
Start: 2022-10-31

## 2022-10-25 RX ORDER — DIPHENHYDRAMINE HYDROCHLORIDE 50 MG/ML
50 INJECTION, SOLUTION INTRAMUSCULAR; INTRAVENOUS ONCE
Status: CANCELLED | OUTPATIENT
Start: 2022-10-31 | End: 2022-10-31

## 2022-10-25 RX ORDER — ALBUTEROL SULFATE 0.83 MG/ML
2.5 SOLUTION RESPIRATORY (INHALATION) AS NEEDED
Status: CANCELLED
Start: 2022-10-31

## 2022-10-25 RX ORDER — SODIUM CHLORIDE 9 MG/ML
100 INJECTION, SOLUTION INTRAVENOUS CONTINUOUS
Status: CANCELLED | OUTPATIENT
Start: 2022-10-31

## 2022-10-25 RX ORDER — SODIUM CHLORIDE 0.9 % (FLUSH) 0.9 %
10 SYRINGE (ML) INJECTION AS NEEDED
Status: CANCELLED | OUTPATIENT
Start: 2022-10-31

## 2022-10-25 RX ORDER — HEPARIN 100 UNIT/ML
300-500 SYRINGE INTRAVENOUS AS NEEDED
Status: CANCELLED | OUTPATIENT
Start: 2022-10-31

## 2022-10-26 ENCOUNTER — TELEPHONE (OUTPATIENT)
Dept: FAMILY MEDICINE CLINIC | Facility: CLINIC | Age: 72
End: 2022-10-26

## 2022-10-26 DIAGNOSIS — C85.95 LYMPHOMA OF RIGHT INGUINAL REGION (HCC): ICD-10-CM

## 2022-10-26 DIAGNOSIS — I10 ESSENTIAL HYPERTENSION: Primary | ICD-10-CM

## 2022-10-26 RX ORDER — LOSARTAN POTASSIUM 50 MG/1
50 TABLET ORAL 2 TIMES DAILY
Qty: 60 TABLET | Refills: 5 | Status: SHIPPED | OUTPATIENT
Start: 2022-10-26 | End: 2022-11-25

## 2022-10-26 NOTE — TELEPHONE ENCOUNTER
Patient's daughter called to request a refill of losartan. CMP earlier this month WDL. States that thinks are generally going okay. Patient will start antibody therapy in the near future, admits that she is unclear on if this means that the disease has progressed. States that patient has an EKG as well as a PET/CT in the near future. Losartan refilled to pharmacy on file. Patient contacted with  to discuss indication of antibody therapy following chemo as well as indication for PET/CT following chemotherapy. Admits that they are hopeful, excited as the tumor was the size of a grape two appointments ago, and most recent appointment it was not even palpable. Encouraged to call with any questions or concerns. Reassured that [facility] is here to support them.

## 2022-10-28 ENCOUNTER — HOSPITAL ENCOUNTER (OUTPATIENT)
Dept: INFUSION THERAPY | Age: 72
Discharge: HOME | End: 2022-10-28

## 2022-10-28 VITALS
HEIGHT: 59 IN | TEMPERATURE: 97.7 F | WEIGHT: 161.44 LBS | OXYGEN SATURATION: 97 % | BODY MASS INDEX: 32.55 KG/M2 | SYSTOLIC BLOOD PRESSURE: 131 MMHG | RESPIRATION RATE: 16 BRPM | DIASTOLIC BLOOD PRESSURE: 72 MMHG | HEART RATE: 85 BPM

## 2022-10-28 DIAGNOSIS — E87.6 LOW BLOOD POTASSIUM: ICD-10-CM

## 2022-10-28 DIAGNOSIS — C83.35 DIFFUSE LARGE B-CELL LYMPHOMA OF LYMPH NODES OF LOWER EXTREMITY (HCC): Primary | ICD-10-CM

## 2022-10-28 DIAGNOSIS — E13.9 DIABETES 1.5, MANAGED AS TYPE 2 (HCC): ICD-10-CM

## 2022-10-28 LAB
ALBUMIN SERPL-MCNC: 3.3 G/DL (ref 3.4–5)
ALBUMIN/GLOB SERPL: 1.1 {RATIO} (ref 0.8–1.7)
ALP SERPL-CCNC: 88 U/L (ref 45–117)
ALT SERPL-CCNC: 41 U/L (ref 13–56)
ANION GAP SERPL CALC-SCNC: 7 MMOL/L (ref 3–18)
AST SERPL-CCNC: 26 U/L (ref 10–38)
BASOPHILS # BLD: 0.1 K/UL (ref 0–0.1)
BASOPHILS NFR BLD: 2 % (ref 0–2)
BILIRUB SERPL-MCNC: 0.3 MG/DL (ref 0.2–1)
BUN SERPL-MCNC: 14 MG/DL (ref 7–18)
BUN/CREAT SERPL: 20 (ref 12–20)
CALCIUM SERPL-MCNC: 9 MG/DL (ref 8.5–10.1)
CHLORIDE SERPL-SCNC: 100 MMOL/L (ref 100–111)
CO2 SERPL-SCNC: 29 MMOL/L (ref 21–32)
CREAT SERPL-MCNC: 0.7 MG/DL (ref 0.6–1.3)
DIFFERENTIAL METHOD BLD: ABNORMAL
EOSINOPHIL # BLD: 0.1 K/UL (ref 0–0.4)
EOSINOPHIL NFR BLD: 3 % (ref 0–5)
ERYTHROCYTE [DISTWIDTH] IN BLOOD BY AUTOMATED COUNT: 14.9 % (ref 11.6–14.5)
GLOBULIN SER CALC-MCNC: 3 G/DL (ref 2–4)
GLUCOSE SERPL-MCNC: 209 MG/DL (ref 74–99)
HCT VFR BLD AUTO: 32.5 % (ref 35–45)
HGB BLD-MCNC: 10.8 G/DL (ref 12–16)
IMM GRANULOCYTES # BLD AUTO: 0.1 K/UL (ref 0–0.04)
IMM GRANULOCYTES NFR BLD AUTO: 3 % (ref 0–0.5)
LDH SERPL L TO P-CCNC: 224 U/L (ref 81–234)
LYMPHOCYTES # BLD: 0.6 K/UL (ref 0.9–3.6)
LYMPHOCYTES NFR BLD: 15 % (ref 21–52)
MCH RBC QN AUTO: 27.5 PG (ref 24–34)
MCHC RBC AUTO-ENTMCNC: 33.2 G/DL (ref 31–37)
MCV RBC AUTO: 82.7 FL (ref 78–100)
MONOCYTES # BLD: 0.8 K/UL (ref 0.05–1.2)
MONOCYTES NFR BLD: 20 % (ref 3–10)
NEUTS SEG # BLD: 2.1 K/UL (ref 1.8–8)
NEUTS SEG NFR BLD: 56 % (ref 40–73)
NRBC # BLD: 0 K/UL (ref 0–0.01)
NRBC BLD-RTO: 0 PER 100 WBC
PLATELET # BLD AUTO: 249 K/UL (ref 135–420)
PMV BLD AUTO: 9.3 FL (ref 9.2–11.8)
POTASSIUM SERPL-SCNC: 2.9 MMOL/L (ref 3.5–5.5)
PROT SERPL-MCNC: 6.3 G/DL (ref 6.4–8.2)
RBC # BLD AUTO: 3.93 M/UL (ref 4.2–5.3)
SODIUM SERPL-SCNC: 136 MMOL/L (ref 136–145)
WBC # BLD AUTO: 3.8 K/UL (ref 4.6–13.2)

## 2022-10-28 PROCEDURE — 80053 COMPREHEN METABOLIC PANEL: CPT

## 2022-10-28 PROCEDURE — 85025 COMPLETE CBC W/AUTO DIFF WBC: CPT

## 2022-10-28 PROCEDURE — 83615 LACTATE (LD) (LDH) ENZYME: CPT

## 2022-10-28 PROCEDURE — 74011000250 HC RX REV CODE- 250: Performed by: INTERNAL MEDICINE

## 2022-10-28 PROCEDURE — 77030012965 HC NDL HUBR BBMI -A

## 2022-10-28 PROCEDURE — 36591 DRAW BLOOD OFF VENOUS DEVICE: CPT

## 2022-10-28 PROCEDURE — 74011250636 HC RX REV CODE- 250/636

## 2022-10-28 RX ORDER — SODIUM CHLORIDE 0.9 % (FLUSH) 0.9 %
5-10 SYRINGE (ML) INJECTION EVERY 8 HOURS
Status: CANCELLED | OUTPATIENT
Start: 2022-10-28

## 2022-10-28 RX ORDER — SODIUM CHLORIDE 0.9 % (FLUSH) 0.9 %
5-10 SYRINGE (ML) INJECTION EVERY 8 HOURS
Status: DISCONTINUED | OUTPATIENT
Start: 2022-10-28 | End: 2022-10-30 | Stop reason: HOSPADM

## 2022-10-28 RX ORDER — HEPARIN 100 UNIT/ML
SYRINGE INTRAVENOUS
Status: COMPLETED
Start: 2022-10-28 | End: 2022-10-28

## 2022-10-28 RX ORDER — HEPARIN 100 UNIT/ML
500 SYRINGE INTRAVENOUS ONCE
Status: CANCELLED | OUTPATIENT
Start: 2022-10-28 | End: 2022-10-28

## 2022-10-28 RX ORDER — POTASSIUM CHLORIDE 20 MEQ/1
20 TABLET, EXTENDED RELEASE ORAL 2 TIMES DAILY
Qty: 40 TABLET | Refills: 1 | Status: SHIPPED | OUTPATIENT
Start: 2022-10-28 | End: 2022-12-07

## 2022-10-28 RX ORDER — HEPARIN 100 UNIT/ML
500 SYRINGE INTRAVENOUS AS NEEDED
Status: DISCONTINUED | OUTPATIENT
Start: 2022-10-28 | End: 2022-10-30 | Stop reason: HOSPADM

## 2022-10-28 RX ADMIN — SODIUM CHLORIDE, PRESERVATIVE FREE 10 ML: 5 INJECTION INTRAVENOUS at 08:18

## 2022-10-28 RX ADMIN — HEPARIN 500 UNITS: 100 SYRINGE at 08:20

## 2022-10-28 NOTE — PROGRESS NOTES
[facility] Progress Note    Date: 2022    Name: Jihan Sanchez              MRN: [id number]              : 1950  Pre-Chemo labs      Ms. Tracy Palmer was assessed and education was provided. Ms. Gurpreet Hill vitals were reviewed. Visit Vitals  /72 (BP 1 Location: Left arm, BP Patient Position: Sitting)   Pulse 85   Temp 97.7 °F (36.5 °C)   Resp 16   Ht 4' 11\" (1.499 m)   Wt 73.2 kg (161 lb 7 oz)   SpO2 97%   BMI 32.61 kg/m²          Right chest medi-port accessed and blood drawn per orders, per protocol without complications. Port flushed and de-accessed per orders, per protocol without complications. Band aid applied to site     Ms. Tracy Palmer tolerated the blood draw, and had no complaints at this time. Armband removed and shredded. Ms. Tracy Palmer was discharged from [place] [address] in stable condition at 9309. She is to return on 10/31/2022 at 0800 for her next appointment.     Jessica Oliver RN  2022  9:43 AM

## 2022-10-28 NOTE — PROGRESS NOTES
Dr. Hellen Barbosa called to inform us of patients critical potassium level, of 2.9. Dr. Hellen Barbosa is going to phone a prescription in to patients pharmacy and provided order to repeat potassium level on Monday 10/31/22 when patient is here for appointment    Contacted patients daughter Kristen Cevallos  and relayed information to pick meds at pharmacy and start patient on Potassium tonight. Reviewed foods that have high potassium levels and advised her that we will repeat labs on Monday.       Verbalized understanding and agrees with plan

## 2022-10-31 ENCOUNTER — HOSPITAL ENCOUNTER (OUTPATIENT)
Dept: INFUSION THERAPY | Age: 72
Discharge: HOME | End: 2022-10-31

## 2022-10-31 VITALS
OXYGEN SATURATION: 97 % | DIASTOLIC BLOOD PRESSURE: 78 MMHG | RESPIRATION RATE: 16 BRPM | TEMPERATURE: 98.8 F | HEART RATE: 82 BPM | SYSTOLIC BLOOD PRESSURE: 152 MMHG

## 2022-10-31 DIAGNOSIS — C83.30 DIFFUSE LARGE B-CELL LYMPHOMA, UNSPECIFIED BODY REGION (HCC): Primary | ICD-10-CM

## 2022-10-31 DIAGNOSIS — R22.41 LUMP OF SKIN OF RIGHT LOWER EXTREMITY: ICD-10-CM

## 2022-10-31 LAB — POTASSIUM SERPL-SCNC: 3.2 MMOL/L (ref 3.5–5.5)

## 2022-10-31 PROCEDURE — 96413 CHEMO IV INFUSION 1 HR: CPT

## 2022-10-31 PROCEDURE — 96415 CHEMO IV INFUSION ADDL HR: CPT

## 2022-10-31 PROCEDURE — 74011250636 HC RX REV CODE- 250/636: Performed by: INTERNAL MEDICINE

## 2022-10-31 PROCEDURE — 96375 TX/PRO/DX INJ NEW DRUG ADDON: CPT

## 2022-10-31 PROCEDURE — 84132 ASSAY OF SERUM POTASSIUM: CPT

## 2022-10-31 PROCEDURE — 74011250637 HC RX REV CODE- 250/637: Performed by: INTERNAL MEDICINE

## 2022-10-31 PROCEDURE — 77030012965 HC NDL HUBR BBMI -A

## 2022-10-31 PROCEDURE — 74011000250 HC RX REV CODE- 250: Performed by: INTERNAL MEDICINE

## 2022-10-31 RX ORDER — SODIUM CHLORIDE 9 MG/ML
100 INJECTION, SOLUTION INTRAVENOUS CONTINUOUS
Status: DISPENSED | OUTPATIENT
Start: 2022-10-31 | End: 2022-10-31

## 2022-10-31 RX ORDER — SODIUM CHLORIDE 0.9 % (FLUSH) 0.9 %
10 SYRINGE (ML) INJECTION AS NEEDED
Status: DISPENSED | OUTPATIENT
Start: 2022-10-31 | End: 2022-10-31

## 2022-10-31 RX ORDER — ACETAMINOPHEN 325 MG/1
650 TABLET ORAL ONCE
Status: COMPLETED | OUTPATIENT
Start: 2022-10-31 | End: 2022-10-31

## 2022-10-31 RX ORDER — SODIUM CHLORIDE 9 MG/ML
25 INJECTION, SOLUTION INTRAVENOUS CONTINUOUS
Status: DISPENSED | OUTPATIENT
Start: 2022-10-31 | End: 2022-10-31

## 2022-10-31 RX ORDER — HEPARIN 100 UNIT/ML
300-500 SYRINGE INTRAVENOUS AS NEEDED
Status: DISPENSED | OUTPATIENT
Start: 2022-10-31 | End: 2022-10-31

## 2022-10-31 RX ORDER — DIPHENHYDRAMINE HYDROCHLORIDE 50 MG/ML
50 INJECTION, SOLUTION INTRAMUSCULAR; INTRAVENOUS ONCE
Status: COMPLETED | OUTPATIENT
Start: 2022-10-31 | End: 2022-10-31

## 2022-10-31 RX ADMIN — SODIUM CHLORIDE 100 ML/HR: 0.9 INJECTION, SOLUTION INTRAVENOUS at 09:31

## 2022-10-31 RX ADMIN — DIPHENHYDRAMINE HYDROCHLORIDE 50 MG: 50 INJECTION INTRAMUSCULAR; INTRAVENOUS at 08:52

## 2022-10-31 RX ADMIN — HEPARIN 500 UNITS: 100 SYRINGE at 13:42

## 2022-10-31 RX ADMIN — SODIUM CHLORIDE 500 ML: 0.9 INJECTION, SOLUTION INTRAVENOUS at 08:30

## 2022-10-31 RX ADMIN — SODIUM CHLORIDE, PRESERVATIVE FREE 10 ML: 5 INJECTION INTRAVENOUS at 13:40

## 2022-10-31 RX ADMIN — SODIUM CHLORIDE 660 MG: 900 INJECTION, SOLUTION INTRAVENOUS at 10:09

## 2022-10-31 RX ADMIN — ACETAMINOPHEN 650 MG: 325 TABLET ORAL at 08:36

## 2022-10-31 RX ADMIN — SODIUM CHLORIDE 25 ML/HR: 0.9 INJECTION, SOLUTION INTRAVENOUS at 08:30

## 2022-10-31 NOTE — PROGRESS NOTES
[facility] Progress Note    Date: 2022    Name: Shelly Russell              MRN: [id number]              : 1950    Chemotherapy Cycle: C8D2     Rituxan    Ms. June Gray was assessed and education was provided. Ms. June Gray arrived ambulatory using cane accompanied by her daughter. Ms. June Gray speaks very little english per patient and daughter. Translation services being used via ipad. Treatment education reviewed with Ms. June Gray and daughter. Pt reports fatigue, intermittent headaches and sore throat. She denies cough, runny nose any n/v/d. Pt is afebrile and in NAD. Hydration and nutrition encouraged. Repeat leb for K+ order by dr. Nathan Guajardo d/t K+ level = 2.9 on 10/28/22. Ms. Nacho Case vitals were reviewed. Visit Vitals  BP (!) 152/78 (BP 1 Location: Left arm, BP Patient Position: Sitting)   Pulse 82   Temp 98.8 °F (37.1 °C)   Resp 16   SpO2 97%     Lab results were obtained and reviewed dated 10/28/22. Mediport accessed with 20 gauge 0.75 inch needle without complications. Port flushes without resistance and positive brisk blood return noted. Blood drawn for potassium per orders and sent to lab. Treatment initiated per orders. NS  ml bolus initiated. NS infusing IV 100ml/hr. Pre-medications (Tylenol 650 mg po, Benadryl 50 mg IV) were administered as ordered. .     Rituxan 660 mg was initiated at 100 mg/hr (50 ml/hr) for 30 minutes, increased by 50ml every 30 minutes to max rate of  200 ml/hr per orders without complications. Pt tolerates well, is resting comfortably. Results from labs reviewed K+ =3.2, Dr. Nathan Guajardo made aware and ordered pt to continue with oral potassium. Port flushed and de-accessed per orders, per protocol without complications. Band aid applied to site. Ms. June Gray tolerated infusions, and had no complaints at this time. Ms. June Gray was discharged from [place] [address] in stable condition at [phone].  She is to return on 11/18/22 at 0800 for her next appointment. Armband removed and shredded.     Angel Randall RN  October 31, 2022  8:58 AM

## 2022-11-01 ENCOUNTER — TELEPHONE (OUTPATIENT)
Dept: FAMILY MEDICINE CLINIC | Facility: CLINIC | Age: 72
End: 2022-11-01

## 2022-11-01 DIAGNOSIS — L29.9 ITCHING: Primary | ICD-10-CM

## 2022-11-01 RX ORDER — METHYLPREDNISOLONE 4 MG/1
TABLET ORAL
Qty: 1 DOSE PACK | Refills: 0 | Status: SHIPPED | OUTPATIENT
Start: 2022-11-01

## 2022-11-01 RX ORDER — HYDROXYZINE 25 MG/1
25 TABLET, FILM COATED ORAL
Qty: 30 TABLET | Refills: 0 | Status: SHIPPED | OUTPATIENT
Start: 2022-11-01 | End: 2022-11-11

## 2022-11-01 NOTE — TELEPHONE ENCOUNTER
Was contacted by Kristen Cevallos, patient's daughter, and advised that the patient had her first immunoglobulin infusion, rituximab, yesterday and since has had severe generalized itching. Denies any rash. Has taken OTC benadryl with no relief. States that she did attempt to contact oncology, left a message. Dr. Spenser Guadalupe contacted by this office and advised of symptoms. Hydroxyzine and medrol dosepak sent to pharmacy on file, patient advised on indication and use. Encouraged to call with any questions or concerns.

## 2022-11-03 ENCOUNTER — HOSPITAL ENCOUNTER (OUTPATIENT)
Dept: NON INVASIVE DIAGNOSTICS | Age: 72
Discharge: HOME | End: 2022-11-03
Attending: INTERNAL MEDICINE

## 2022-11-03 VITALS
HEIGHT: 59 IN | WEIGHT: 161 LBS | BODY MASS INDEX: 32.46 KG/M2 | SYSTOLIC BLOOD PRESSURE: 149 MMHG | DIASTOLIC BLOOD PRESSURE: 78 MMHG

## 2022-11-03 DIAGNOSIS — D50.8 OTHER IRON DEFICIENCY ANEMIA: ICD-10-CM

## 2022-11-03 DIAGNOSIS — K43.9 HERNIA OF ABDOMINAL WALL: ICD-10-CM

## 2022-11-03 DIAGNOSIS — E53.8 VITAMIN B 12 DEFICIENCY: ICD-10-CM

## 2022-11-03 DIAGNOSIS — D24.2 FIBROADENOMA OF BREAST, LEFT: ICD-10-CM

## 2022-11-03 DIAGNOSIS — M17.0 PRIMARY OSTEOARTHRITIS OF BOTH KNEES: ICD-10-CM

## 2022-11-03 DIAGNOSIS — C83.35 DIFFUSE LARGE B-CELL LYMPHOMA OF LYMPH NODES OF LOWER EXTREMITY (HCC): ICD-10-CM

## 2022-11-03 DIAGNOSIS — E13.9 DIABETES 1.5, MANAGED AS TYPE 2 (HCC): ICD-10-CM

## 2022-11-03 DIAGNOSIS — R22.41 MASS OF LEG, RIGHT: ICD-10-CM

## 2022-11-03 DIAGNOSIS — E55.9 VITAMIN D DEFICIENCY: ICD-10-CM

## 2022-11-03 DIAGNOSIS — K27.9 PUD (PEPTIC ULCER DISEASE): ICD-10-CM

## 2022-11-03 LAB
ECHO AO ROOT DIAM: 2.7 CM
ECHO AO ROOT INDEX: 1.61 CM/M2
ECHO AV AREA PEAK VELOCITY: 2.5 CM2
ECHO AV AREA VTI: 2.7 CM2
ECHO AV AREA/BSA PEAK VELOCITY: 1.5 CM2/M2
ECHO AV AREA/BSA VTI: 1.6 CM2/M2
ECHO AV MEAN GRADIENT: 2 MMHG
ECHO AV MEAN VELOCITY: 0.8 M/S
ECHO AV PEAK GRADIENT: 4 MMHG
ECHO AV PEAK VELOCITY: 1.1 M/S
ECHO AV VELOCITY RATIO: 0.82
ECHO AV VTI: 18.4 CM
ECHO EST RA PRESSURE: 3 MMHG
ECHO LA DIAMETER INDEX: 2.14 CM/M2
ECHO LA DIAMETER: 3.6 CM
ECHO LA TO AORTIC ROOT RATIO: 1.33
ECHO LA VOL 2C: 30 ML (ref 22–52)
ECHO LA VOL 4C: 20 ML (ref 22–52)
ECHO LA VOL BP: 25 ML (ref 22–52)
ECHO LA VOL/BSA BIPLANE: 15 ML/M2 (ref 16–34)
ECHO LA VOLUME AREA LENGTH: 28 ML
ECHO LA VOLUME INDEX A2C: 18 ML/M2 (ref 16–34)
ECHO LA VOLUME INDEX A4C: 12 ML/M2 (ref 16–34)
ECHO LA VOLUME INDEX AREA LENGTH: 17 ML/M2 (ref 16–34)
ECHO LV E' LATERAL VELOCITY: 6 CM/S
ECHO LV E' SEPTAL VELOCITY: 6 CM/S
ECHO LV EDV A2C: 33 ML
ECHO LV EDV A4C: 64 ML
ECHO LV EDV BP: 48 ML (ref 56–104)
ECHO LV EDV INDEX A4C: 38 ML/M2
ECHO LV EDV INDEX BP: 29 ML/M2
ECHO LV EDV NDEX A2C: 20 ML/M2
ECHO LV EJECTION FRACTION A2C: 50 %
ECHO LV EJECTION FRACTION A4C: 76 %
ECHO LV EJECTION FRACTION BIPLANE: 68 % (ref 55–100)
ECHO LV ESV A2C: 17 ML
ECHO LV ESV A4C: 15 ML
ECHO LV ESV BP: 15 ML (ref 19–49)
ECHO LV ESV INDEX A2C: 10 ML/M2
ECHO LV ESV INDEX A4C: 9 ML/M2
ECHO LV ESV INDEX BP: 9 ML/M2
ECHO LV FRACTIONAL SHORTENING: 29 % (ref 28–44)
ECHO LV INTERNAL DIMENSION DIASTOLE INDEX: 2.68 CM/M2
ECHO LV INTERNAL DIMENSION DIASTOLIC: 4.5 CM (ref 3.9–5.3)
ECHO LV INTERNAL DIMENSION SYSTOLIC INDEX: 1.9 CM/M2
ECHO LV INTERNAL DIMENSION SYSTOLIC: 3.2 CM
ECHO LV IVSD: 0.9 CM (ref 0.6–0.9)
ECHO LV MASS 2D: 132.8 G (ref 67–162)
ECHO LV MASS INDEX 2D: 79.1 G/M2 (ref 43–95)
ECHO LV POSTERIOR WALL DIASTOLIC: 0.9 CM (ref 0.6–0.9)
ECHO LV RELATIVE WALL THICKNESS RATIO: 0.4
ECHO LVOT AREA: 3.1 CM2
ECHO LVOT AV VTI INDEX: 0.91
ECHO LVOT DIAM: 2 CM
ECHO LVOT MEAN GRADIENT: 2 MMHG
ECHO LVOT PEAK GRADIENT: 3 MMHG
ECHO LVOT PEAK VELOCITY: 0.9 M/S
ECHO LVOT STROKE VOLUME INDEX: 31.4 ML/M2
ECHO LVOT SV: 52.8 ML
ECHO LVOT VTI: 16.8 CM
ECHO MV A VELOCITY: 0.91 M/S
ECHO MV E DECELERATION TIME (DT): 82.4 MS
ECHO MV E VELOCITY: 0.34 M/S
ECHO MV E/A RATIO: 0.37
ECHO MV E/E' LATERAL: 5.67
ECHO MV E/E' RATIO (AVERAGED): 5.67
ECHO MV E/E' SEPTAL: 5.67
ECHO PULMONARY ARTERY SYSTOLIC PRESSURE (PASP): 25 MMHG
ECHO PV ACCELERATION TIME (AT): 57.1 MS
ECHO PV MAX VELOCITY: 0.9 M/S
ECHO PV PEAK GRADIENT: 3 MMHG
ECHO RIGHT VENTRICULAR SYSTOLIC PRESSURE (RVSP): 25 MMHG
ECHO RV FREE WALL PEAK S': 10 CM/S
ECHO RV INTERNAL DIMENSION: 3 CM
ECHO RV TAPSE: 1.6 CM (ref 1.7–?)
ECHO TV REGURGITANT MAX VELOCITY: 2.33 M/S
ECHO TV REGURGITANT PEAK GRADIENT: 22 MMHG

## 2022-11-03 PROCEDURE — 93306 TTE W/DOPPLER COMPLETE: CPT

## 2022-11-04 ENCOUNTER — HOSPITAL ENCOUNTER (OUTPATIENT)
Dept: PET IMAGING | Age: 72
Discharge: HOME | End: 2022-11-04
Attending: INTERNAL MEDICINE

## 2022-11-04 PROCEDURE — A9552 F18 FDG: HCPCS

## 2022-11-04 PROCEDURE — 74011000250 HC RX REV CODE- 250: Performed by: INTERNAL MEDICINE

## 2022-11-04 RX ORDER — FLUDEOXYGLUCOSE F-18 200 MCI/ML
5-10 INJECTION INTRAVENOUS ONCE
Status: COMPLETED | OUTPATIENT
Start: 2022-11-04 | End: 2022-11-04

## 2022-11-04 RX ORDER — BARIUM SULFATE 20 MG/ML
450 SUSPENSION ORAL
Status: COMPLETED | OUTPATIENT
Start: 2022-11-04 | End: 2022-11-04

## 2022-11-04 RX ADMIN — FLUDEOXYGLUCOSE F-18 9.83 MILLICURIE: 200 INJECTION INTRAVENOUS at 07:54

## 2022-11-04 RX ADMIN — BARIUM SULFATE 450 ML: 20 SUSPENSION ORAL at 08:38

## 2022-11-08 RX ORDER — DIPHENHYDRAMINE HYDROCHLORIDE 50 MG/ML
50 INJECTION, SOLUTION INTRAMUSCULAR; INTRAVENOUS ONCE
Status: CANCELLED | OUTPATIENT
Start: 2022-11-21 | End: 2022-11-21

## 2022-11-08 RX ORDER — SODIUM CHLORIDE 9 MG/ML
100 INJECTION, SOLUTION INTRAVENOUS CONTINUOUS
Status: CANCELLED | OUTPATIENT
Start: 2022-11-21

## 2022-11-08 RX ORDER — EPINEPHRINE 1 MG/ML
0.3 INJECTION, SOLUTION, CONCENTRATE INTRAVENOUS AS NEEDED
OUTPATIENT
Start: 2022-11-21

## 2022-11-08 RX ORDER — SODIUM CHLORIDE 9 MG/ML
10 INJECTION INTRAMUSCULAR; INTRAVENOUS; SUBCUTANEOUS AS NEEDED
OUTPATIENT
Start: 2022-11-21

## 2022-11-08 RX ORDER — ACETAMINOPHEN 325 MG/1
650 TABLET ORAL ONCE
Status: CANCELLED | OUTPATIENT
Start: 2022-11-21 | End: 2022-11-21

## 2022-11-08 RX ORDER — HYDROCORTISONE SODIUM SUCCINATE 100 MG/2ML
100 INJECTION, POWDER, FOR SOLUTION INTRAMUSCULAR; INTRAVENOUS AS NEEDED
OUTPATIENT
Start: 2022-11-21

## 2022-11-08 RX ORDER — SODIUM CHLORIDE 0.9 % (FLUSH) 0.9 %
10 SYRINGE (ML) INJECTION AS NEEDED
Status: CANCELLED | OUTPATIENT
Start: 2022-11-21

## 2022-11-08 RX ORDER — ALBUTEROL SULFATE 0.83 MG/ML
2.5 SOLUTION RESPIRATORY (INHALATION) AS NEEDED
Start: 2022-11-21

## 2022-11-08 RX ORDER — SODIUM CHLORIDE 9 MG/ML
25 INJECTION, SOLUTION INTRAVENOUS CONTINUOUS
Status: CANCELLED | OUTPATIENT
Start: 2022-11-21

## 2022-11-08 RX ORDER — DIPHENHYDRAMINE HYDROCHLORIDE 50 MG/ML
50 INJECTION, SOLUTION INTRAMUSCULAR; INTRAVENOUS AS NEEDED
Start: 2022-11-21

## 2022-11-08 RX ORDER — ACETAMINOPHEN 325 MG/1
650 TABLET ORAL AS NEEDED
Start: 2022-11-21

## 2022-11-08 RX ORDER — ONDANSETRON 2 MG/ML
8 INJECTION INTRAMUSCULAR; INTRAVENOUS AS NEEDED
OUTPATIENT
Start: 2022-11-21

## 2022-11-08 RX ORDER — HEPARIN 100 UNIT/ML
300-500 SYRINGE INTRAVENOUS AS NEEDED
Status: CANCELLED | OUTPATIENT
Start: 2022-11-21

## 2022-11-08 RX ORDER — DIPHENHYDRAMINE HYDROCHLORIDE 50 MG/ML
25 INJECTION, SOLUTION INTRAMUSCULAR; INTRAVENOUS AS NEEDED
Start: 2022-11-21

## 2022-11-11 NOTE — PROGRESS NOTES
Hematology/Oncology  Progress Note    Name: Amador Torres  Date: 2022  : 1950  Primary Care Provider: Juanjose Fisher NP    Ms. Brad Elias is a 74y.o. year old female with Diffuse Large B-cell lymphoma. ,   left breast fibroadenoma as per biopsy,  progrssivley enlarging  abdominal hernia repaired     New PORT on right chest is  well positioned by my palpation     Right medial proximal leg adenopathy has resolved completely. 2022  PET/CT  IMPRESSION   1. Interval resection of large right groin FDG avid mass seen on prior PET/CT. No residual malignant FDG uptake in this region or elsewhere. 2. Low level right hilar FDG avidity, probably reactive. No other sites of  suspicious increased FDG uptake. November 3, 2022  ECHO    Left Ventricle: Normal left ventricular systolic function with a visually estimated EF of 60 - 65%. Current Therapy:   R mini CHOP  had  6 cycles last October 10, 2022                                  Now on  single agent retixumab every 3 weeks 375 mg /msq for 18 cycles    Started 2022  C1  (C7 of the regimen)                                     Continue  Retuximab every 3 weeks for seventeen more cycles. We will provide premedication dexamethasone 10 mg IV prior to each Retuximab. She gets pruritis with it. Subjective: The past medical, surgical and social history has been reviewed and remains unchanged.    Past Medical History:   Diagnosis Date    Arthritis     Diabetes ([address]     Hypertension     Lymphoma ([address]     Right leg     Past Surgical History:   Procedure Laterality Date    COLONOSCOPY N/A 2022    COLONOSCOPY performed by Lulu Rodriguez MD at [facility] ENDOSCOPY    HX BACK SURGERY      HX  SECTION      HX CHOLECYSTECTOMY      2022    HX ENDOSCOPY      EGD 2022    HX HERNIA REPAIR      HX HYSTERECTOMY      IR BX BONE MARROW DIAGNOSTIC  2022    IR INSERT TUNL CVC W PORT OVER 5 YEARS 4/25/2022    IR REPAIR CVAD W PORT / PUMP  6/28/2022     Social History     Socioeconomic History    Marital status:      Spouse name: Not on file    Number of children: Not on file    Years of education: Not on file    Highest education level: Not on file   Occupational History    Not on file   Tobacco Use    Smoking status: Never    Smokeless tobacco: Never   Vaping Use    Vaping Use: Never used   Substance and Sexual Activity    Alcohol use: Never    Drug use: Never    Sexual activity: Not on file   Other Topics Concern    Not on file   Social History Narrative    Not on file     Social Determinants of Health     Financial Resource Strain: Not on file   Food Insecurity: Not on file   Transportation Needs: Not on file   Physical Activity: Not on file   Stress: Not on file   Social Connections: Not on file   Intimate Partner Violence: Not on file   Housing Stability: Not on file     Family History   Problem Relation Age of Onset    Stroke Mother     Cancer Sister     Diabetes Sister     Hypertension Sister     Dementia Brother      Current Outpatient Medications   Medication Sig Dispense Refill    methylPREDNISolone (MEDROL DOSEPACK) 4 mg tablet Follow instructions per dose pack 1 Dose Pack 0    potassium chloride (K-DUR, KLOR-CON M20) 20 mEq tablet Take 1 Tablet by mouth two (2) times a day for 40 days. Indications: low amount of potassium in the blood, Start today 40 Tablet 1    losartan (COZAAR) 50 mg tablet Take 1 Tablet by mouth two (2) times a day for 30 days. 60 Tablet 5    traZODone (DESYREL) 50 mg tablet Take 1 Tablet by mouth nightly. 30 Tablet 1    omeprazole (PRILOSEC) 40 mg capsule Take 1 Capsule by mouth two (2) times a day for 360 days. Indications: gastroesophageal reflux disease, patient is on high dose prednisone for her lymphoma 180 Capsule 3    glyBURIDE (DIABETA) 1.25 mg tablet Take 1 Tablet by mouth Daily (before breakfast).  30 Tablet 1    lidocaine-prilocaine (EMLA) topical cream Apply to port 1/2 hour prior to needle insertion 30 g 5    prochlorperazine (COMPAZINE) 10 mg tablet Take 0.5 Tablets by mouth every six (6) hours as needed for Nausea or Vomiting for up to 180 days. Indications: nausea and vomiting caused by cancer drugs 40 Tablet 5    therapeutic multivitamin (THERAGRAN) tablet Take 1 Tablet by mouth daily. verapamiL (CALAN) 80 mg tablet Take 1 Tablet by mouth two (2) times a day. 30 Tablet 2    metFORMIN (GLUCOPHAGE) 850 mg tablet Take 1 Tablet by mouth two (2) times a day. 30 Tablet 2    OTHER Take 5 mg by mouth nightly. glibenclamida - diabetes from [place]      OTHER Take 2 mg by mouth nightly. tolterodina - bladder - from [place]      indomethacin (INDOCIN) 25 mg capsule Take 25 mg by mouth two (2) times a day. oxyCODONE-acetaminophen (Percocet) 5-325 mg per tablet Take 1 Tablet by mouth every four (4) hours as needed for Pain for up to 7 days. Max Daily Amount: 6 Tablets. Indications: pain, severe headache when on the high dose prednison 40 Tablet 0    predniSONE (DELTASONE) 10 mg tablet Take 70 mg by mouth daily for 30 days. Indications: diffuse large B-cell lymphoma (Patient not taking: Reported on 6/28/2022) 35 Tablet 5     Facility-Administered Medications Ordered in Other Visits   Medication Dose Route Frequency Provider Last Rate Last Admin    0.9% sodium chloride infusion  20 mL/hr IntraVENous CONTINUOUS Sonny Armstrong MD 20 mL/hr at 04/25/22 0924 20 mL/hr at 04/25/22 0924    heparin (porcine) 100 unit/mL injection 500 Units  500 Units InterCATHeter PRN Robby Sin MD   500 Units at 04/25/22 1024       Review of Systems:    General :The patient has no new complaints and there is no physical distress evident. Psychological : patient denies having any psychological symptoms such as hallucinations depression or anxiety. Ophthalmic:the patient denies having any visual impairment or eye discomfort. ENT: there are no abnormalities reported.      Allergy and Immunology:the patient denies having any seasonal allergies or allergies to medications other than those already outlined above. Hematological and Lymphatic: the patient denies having any bruising, bleeding or lymphadenopathy. Endocrine: the patient denies having any heat or cold intolerance. There is no history of diabetes or thyroid disorders. Breast: the patient denies having any history of breast mass or lumps. Respiratory:the patient denies having any cough, shortness of breath, or dyspnea on exertion. Cardiovascular: there are no complaints of chest pain, palpitations, chest pounding, or dyspnea on exertion. Gastrointestinal: the patient denies having nausea, emesis, diarrhea, constipation, or blood in the stool. Genito-Urinary: the patient denies having urinary urgency, frequency, or dysuria. Musculoskeletal: with the exception of mild arthralgias the patient has no other musculoskeletal complaints. Neurological:  denies having any numbness, tingling, or neurologic deficits. Dermatological: patient denies having any unexplained rash, skin ulcerations, or hives. Objective:     Visit Vitals  BP (!) 171/76 (BP 1 Location: Left upper arm, BP Patient Position: Sitting)   Pulse 81   Temp 97.6 °F (36.4 °C) (Oral)   Resp 14   Ht 4' 11\" (1.499 m)   Wt 74.4 kg (164 lb)   SpO2 99%   BMI 33.12 kg/m²     Pain Score: 3    Physical Exam: Patients jairo Srivastava, was with her    ECO    General: Well appearing, in NAD    Psychologic: mood and affect are appropriate, no anxiety or depression noted    Skin: examination of the skin reveals no bruising, rash or petechiae    HEENT: Normocephalic, atraumatic. Conjunctiva and sclera are clear. Pupils are equal, round and reactive to light. EOMs are intact.      ENT without oral mucosal lesions, stomatitis or thrush    Neck: supple without lymphadenopathy, JVD or thyromegaly    Lymphatics: no palpable cervical, supraclavicular, infraclavicular, axillary or inguinal lymphadenopathy    Lungs: clear breath sounds bilaterally, no rhonchi or wheezes noted    Heart: Regular rate and rhythm, S1-S2 noted. Abdomen: soft, non-tender, non-distended, no HSM,     Extremities: without clubbing, cyanosis or edema    Neurologic: no focal deficits, steady gait, Alert and oriented x 3. Laboratory Data:     Results for orders placed or performed during the hospital encounter of 10/07/22   CBC WITH 3 PART DIFF     Status: Abnormal   Result Value Ref Range Status    WBC 4.8 4.5 - 13.0 K/uL Final    RBC 4.00 (L) 4.10 - 5.10 M/uL Final    HGB 10.8 (L) 12.0 - 16.0 g/dL Final    HCT 33.4 (L) 36 - 48 % Final    MCV 83.5 78 - 102 FL Final    MCH 27.0 25.0 - 35.0 PG Final    MCHC 32.3 31 - 37 g/dL Final    RDW 15.6 (H) 11.5 - 14.5 % Final    PLATELET 843 237 - 209 K/uL Final    NEUTROPHILS 67 40 - 70 % Final    Mixed cells 20 (H) 0.1 - 17 % Final    LYMPHOCYTES 13 (L) 14 - 44 % Final    ABS. NEUTROPHILS 3.2 1.8 - 9.5 K/UL Final    ABS. MIXED CELLS 1.0 0.0 - 2.3 K/uL Final    ABS. LYMPHOCYTES 0.6 (L) 1.1 - 5.9 K/UL Final     Comment: Test performed at [address] [place] [address] or Outpatient [facility]fusion [facility] Location. Reviewed by Medical Director. DF AUTOMATED   Final       Patient Active Problem List   Diagnosis Code    Lump of skin of right lower extremity R22.41    Primary osteoarthritis of both knees M17.0    Diabetes mellitus without complication (HCC) D17.7    Helicobacter pylori antibody positive R76.8    DLBCL (diffuse large B cell lymphoma) (HCC) C83.30         Assessment:     1. Diffuse large B-cell lymphoma of lymph nodes of lower extremity (HCC)    2. Primary osteoarthritis of both knees    3. PUD (peptic ulcer disease)    4. Other secondary hypertension    5. Fibroadenoma of breast, left    6. Vitamin B 12 deficiency    7. Other iron deficiency anemia    8. Vitamin D deficiency    9.  Mass of leg, right        Plan: Completed  R mini CHOP, six cycles  POST R mini CHOP continue single agent Rituximab 375mg/msq every 3 weeks for 18 cycles as patient has a luis miguel risk lymphoma  Add dexamethasone 10 mg iv as premedication prior to each Retuximab due to pruritis. ECHO good and PET/CT CR  Follow-up visit in 8 weeks. Patient and her grandson had opportunity to ask excellent questions and we answered   Patient and her grandson  are in agreement with the plan above              Follow-up and Dispositions    Return in about 2 months (around 1/14/2023) for Follow up with labs, Follow_up In Person. No orders of the defined types were placed in this encounter.       Alexx Reid MD  11/14/2022

## 2022-11-14 ENCOUNTER — OFFICE VISIT (OUTPATIENT)
Age: 72
End: 2022-11-14

## 2022-11-14 VITALS
HEIGHT: 59 IN | HEART RATE: 81 BPM | BODY MASS INDEX: 33.06 KG/M2 | RESPIRATION RATE: 14 BRPM | OXYGEN SATURATION: 99 % | WEIGHT: 164 LBS | SYSTOLIC BLOOD PRESSURE: 171 MMHG | DIASTOLIC BLOOD PRESSURE: 76 MMHG | TEMPERATURE: 97.6 F

## 2022-11-14 DIAGNOSIS — E55.9 VITAMIN D DEFICIENCY: ICD-10-CM

## 2022-11-14 DIAGNOSIS — D50.8 OTHER IRON DEFICIENCY ANEMIA: ICD-10-CM

## 2022-11-14 DIAGNOSIS — K27.9 PUD (PEPTIC ULCER DISEASE): ICD-10-CM

## 2022-11-14 DIAGNOSIS — R22.41 MASS OF LEG, RIGHT: ICD-10-CM

## 2022-11-14 DIAGNOSIS — D24.2 FIBROADENOMA OF BREAST, LEFT: ICD-10-CM

## 2022-11-14 DIAGNOSIS — E53.8 VITAMIN B 12 DEFICIENCY: ICD-10-CM

## 2022-11-14 DIAGNOSIS — M17.0 PRIMARY OSTEOARTHRITIS OF BOTH KNEES: ICD-10-CM

## 2022-11-14 DIAGNOSIS — I15.8 OTHER SECONDARY HYPERTENSION: ICD-10-CM

## 2022-11-14 DIAGNOSIS — C83.35 DIFFUSE LARGE B-CELL LYMPHOMA OF LYMPH NODES OF LOWER EXTREMITY (HCC): Primary | ICD-10-CM

## 2022-11-14 PROCEDURE — 1123F ACP DISCUSS/DSCN MKR DOCD: CPT | Performed by: INTERNAL MEDICINE

## 2022-11-14 PROCEDURE — 99214 OFFICE O/P EST MOD 30 MIN: CPT | Performed by: INTERNAL MEDICINE

## 2022-11-14 NOTE — Clinical Note
Please continue Retuximab every 3 weeks for seventeen more cycles. Pleas provide premedication dexamethasone 10 mg IV prior to each Retuximab. She gets pruritis with it.   Thank you

## 2022-11-18 ENCOUNTER — HOSPITAL ENCOUNTER (OUTPATIENT)
Dept: INFUSION THERAPY | Age: 72
Discharge: HOME | End: 2022-11-18

## 2022-11-18 VITALS
DIASTOLIC BLOOD PRESSURE: 81 MMHG | WEIGHT: 161.5 LBS | BODY MASS INDEX: 32.56 KG/M2 | SYSTOLIC BLOOD PRESSURE: 149 MMHG | RESPIRATION RATE: 16 BRPM | OXYGEN SATURATION: 100 % | HEART RATE: 95 BPM | HEIGHT: 59 IN | TEMPERATURE: 99.4 F

## 2022-11-18 LAB
ALBUMIN SERPL-MCNC: 3.5 G/DL (ref 3.4–5)
ALBUMIN/GLOB SERPL: 1.1 {RATIO} (ref 0.8–1.7)
ALP SERPL-CCNC: 85 U/L (ref 45–117)
ALT SERPL-CCNC: 45 U/L (ref 13–56)
ANION GAP SERPL CALC-SCNC: 7 MMOL/L (ref 3–18)
AST SERPL-CCNC: 29 U/L (ref 10–38)
BASOPHILS # BLD: 0.1 K/UL (ref 0–0.1)
BASOPHILS NFR BLD: 1 % (ref 0–2)
BILIRUB SERPL-MCNC: 0.3 MG/DL (ref 0.2–1)
BUN SERPL-MCNC: 14 MG/DL (ref 7–18)
BUN/CREAT SERPL: 18 (ref 12–20)
CALCIUM SERPL-MCNC: 9.4 MG/DL (ref 8.5–10.1)
CHLORIDE SERPL-SCNC: 101 MMOL/L (ref 100–111)
CO2 SERPL-SCNC: 27 MMOL/L (ref 21–32)
CREAT SERPL-MCNC: 0.8 MG/DL (ref 0.6–1.3)
DIFFERENTIAL METHOD BLD: ABNORMAL
EOSINOPHIL # BLD: 0.3 K/UL (ref 0–0.4)
EOSINOPHIL NFR BLD: 8 % (ref 0–5)
ERYTHROCYTE [DISTWIDTH] IN BLOOD BY AUTOMATED COUNT: 14.2 % (ref 11.6–14.5)
GLOBULIN SER CALC-MCNC: 3.2 G/DL (ref 2–4)
GLUCOSE SERPL-MCNC: 142 MG/DL (ref 74–99)
HCT VFR BLD AUTO: 34 % (ref 35–45)
HGB BLD-MCNC: 11.4 G/DL (ref 12–16)
IMM GRANULOCYTES # BLD AUTO: 0 K/UL (ref 0–0.04)
IMM GRANULOCYTES NFR BLD AUTO: 1 % (ref 0–0.5)
LDH SERPL L TO P-CCNC: 234 U/L (ref 81–234)
LYMPHOCYTES # BLD: 0.6 K/UL (ref 0.9–3.6)
LYMPHOCYTES NFR BLD: 15 % (ref 21–52)
MCH RBC QN AUTO: 27.5 PG (ref 24–34)
MCHC RBC AUTO-ENTMCNC: 33.5 G/DL (ref 31–37)
MCV RBC AUTO: 82.1 FL (ref 78–100)
MONOCYTES # BLD: 0.9 K/UL (ref 0.05–1.2)
MONOCYTES NFR BLD: 23 % (ref 3–10)
NEUTS SEG # BLD: 2 K/UL (ref 1.8–8)
NEUTS SEG NFR BLD: 52 % (ref 40–73)
NRBC # BLD: 0 K/UL (ref 0–0.01)
NRBC BLD-RTO: 0 PER 100 WBC
PLATELET # BLD AUTO: 230 K/UL (ref 135–420)
PMV BLD AUTO: 9.9 FL (ref 9.2–11.8)
POTASSIUM SERPL-SCNC: 3.3 MMOL/L (ref 3.5–5.5)
PROT SERPL-MCNC: 6.7 G/DL (ref 6.4–8.2)
RBC # BLD AUTO: 4.14 M/UL (ref 4.2–5.3)
SODIUM SERPL-SCNC: 135 MMOL/L (ref 136–145)
WBC # BLD AUTO: 3.9 K/UL (ref 4.6–13.2)

## 2022-11-18 PROCEDURE — 74011000250 HC RX REV CODE- 250: Performed by: INTERNAL MEDICINE

## 2022-11-18 PROCEDURE — 80053 COMPREHEN METABOLIC PANEL: CPT

## 2022-11-18 PROCEDURE — 77030012965 HC NDL HUBR BBMI -A

## 2022-11-18 PROCEDURE — 74011250636 HC RX REV CODE- 250/636: Performed by: INTERNAL MEDICINE

## 2022-11-18 PROCEDURE — 36591 DRAW BLOOD OFF VENOUS DEVICE: CPT

## 2022-11-18 PROCEDURE — 85025 COMPLETE CBC W/AUTO DIFF WBC: CPT

## 2022-11-18 PROCEDURE — 83615 LACTATE (LD) (LDH) ENZYME: CPT

## 2022-11-18 RX ORDER — HEPARIN 100 UNIT/ML
500 SYRINGE INTRAVENOUS ONCE
Status: COMPLETED | OUTPATIENT
Start: 2022-11-18 | End: 2022-11-18

## 2022-11-18 RX ORDER — SODIUM CHLORIDE 0.9 % (FLUSH) 0.9 %
5-10 SYRINGE (ML) INJECTION AS NEEDED
Status: DISCONTINUED | OUTPATIENT
Start: 2022-11-18 | End: 2022-11-20 | Stop reason: HOSPADM

## 2022-11-18 RX ADMIN — SODIUM CHLORIDE, PRESERVATIVE FREE 10 ML: 5 INJECTION INTRAVENOUS at 08:55

## 2022-11-18 RX ADMIN — Medication 500 UNITS: at 08:55

## 2022-11-18 NOTE — PROGRESS NOTES
[facility] Progress Note    Date: 2022    Name: Lalita Cueto              MRN: [id number]              : 1950    Pre-Chemo labs    Ms. Ryley Rosales was assessed and education was provided. Ms. Ryley Rosales arrived ambulatory using cane accompanied by her daughter. Translation services being used via ipad. Treatment education reviewed with Ms. Ryley Rosales and daughter. Ms. Michelle Shepard vitals were reviewed. Visit Vitals  BP (!) 149/81 (BP 1 Location: Left arm, BP Patient Position: Sitting)   Pulse 95   Temp 99.4 °F (37.4 °C)   Resp 16   Ht 4' 11\" (1.499 m)   Wt 73.3 kg (161 lb 8 oz)   SpO2 100%   BMI 32.62 kg/m²       Lab results were obtained and reviewed. Recent Results (from the past 12 hour(s))   METABOLIC PANEL, COMPREHENSIVE    Collection Time: 22  8:30 AM   Result Value Ref Range    Sodium 135 (L) 136 - 145 mmol/L    Potassium 3.3 (L) 3.5 - 5.5 mmol/L    Chloride 101 100 - 111 mmol/L    CO2 27 21 - 32 mmol/L    Anion gap 7 3.0 - 18 mmol/L    Glucose 142 (H) 74 - 99 mg/dL    BUN 14 7.0 - 18 MG/DL    Creatinine 0.80 0.6 - 1.3 MG/DL    BUN/Creatinine ratio 18 12 - 20      eGFR >60 >60 ml/min/1.73m2    Calcium 9.4 8.5 - 10.1 MG/DL    Bilirubin, total 0.3 0.2 - 1.0 MG/DL    ALT (SGPT) 45 13 - 56 U/L    AST (SGOT) 29 10 - 38 U/L    Alk. phosphatase 85 45 - 117 U/L    Protein, total 6.7 6.4 - 8.2 g/dL    Albumin 3.5 3.4 - 5.0 g/dL    Globulin 3.2 2.0 - 4.0 g/dL    A-G Ratio 1.1 0.8 - 1.7         Right chest medi-port accessed and blood drawn per orders, per protocol without complications. Port flushed and de-accessed per orders, per protocol without complications. Band aid applied to site     Ms. Ryley Rosales tolerated the blood draw, and had no complaints at this time. Armband removed and shredded. Ms. Ryley Rosales was discharged from [place] [address] in stable condition at 0900. She is to return on 22 at 0800 for her next appointment.     Daniela Sparks KIM Garcia  November 18, 2022  12:12 PM

## 2022-11-21 ENCOUNTER — HOSPITAL ENCOUNTER (OUTPATIENT)
Dept: INFUSION THERAPY | Age: 72
Discharge: HOME | End: 2022-11-21

## 2022-11-21 VITALS
SYSTOLIC BLOOD PRESSURE: 143 MMHG | HEART RATE: 70 BPM | OXYGEN SATURATION: 100 % | TEMPERATURE: 98 F | RESPIRATION RATE: 16 BRPM | DIASTOLIC BLOOD PRESSURE: 71 MMHG

## 2022-11-21 DIAGNOSIS — C83.30 DIFFUSE LARGE B-CELL LYMPHOMA, UNSPECIFIED BODY REGION (HCC): Primary | ICD-10-CM

## 2022-11-21 DIAGNOSIS — R22.41 LUMP OF SKIN OF RIGHT LOWER EXTREMITY: ICD-10-CM

## 2022-11-21 PROCEDURE — 96375 TX/PRO/DX INJ NEW DRUG ADDON: CPT

## 2022-11-21 PROCEDURE — 74011250637 HC RX REV CODE- 250/637: Performed by: INTERNAL MEDICINE

## 2022-11-21 PROCEDURE — 74011000258 HC RX REV CODE- 258: Performed by: INTERNAL MEDICINE

## 2022-11-21 PROCEDURE — 74011000250 HC RX REV CODE- 250: Performed by: INTERNAL MEDICINE

## 2022-11-21 PROCEDURE — 96413 CHEMO IV INFUSION 1 HR: CPT

## 2022-11-21 PROCEDURE — 74011250636 HC RX REV CODE- 250/636: Performed by: INTERNAL MEDICINE

## 2022-11-21 PROCEDURE — 77030012965 HC NDL HUBR BBMI -A

## 2022-11-21 PROCEDURE — 96367 TX/PROPH/DG ADDL SEQ IV INF: CPT

## 2022-11-21 PROCEDURE — 96415 CHEMO IV INFUSION ADDL HR: CPT

## 2022-11-21 RX ORDER — SODIUM CHLORIDE 9 MG/ML
100 INJECTION, SOLUTION INTRAVENOUS CONTINUOUS
Status: DISPENSED | OUTPATIENT
Start: 2022-11-21 | End: 2022-11-21

## 2022-11-21 RX ORDER — ACETAMINOPHEN 325 MG/1
650 TABLET ORAL ONCE
Status: COMPLETED | OUTPATIENT
Start: 2022-11-21 | End: 2022-11-21

## 2022-11-21 RX ORDER — SODIUM CHLORIDE 9 MG/ML
25 INJECTION, SOLUTION INTRAVENOUS CONTINUOUS
Status: DISPENSED | OUTPATIENT
Start: 2022-11-21 | End: 2022-11-21

## 2022-11-21 RX ORDER — HEPARIN 100 UNIT/ML
300-500 SYRINGE INTRAVENOUS AS NEEDED
Status: DISPENSED | OUTPATIENT
Start: 2022-11-21 | End: 2022-11-21

## 2022-11-21 RX ORDER — DIPHENHYDRAMINE HYDROCHLORIDE 50 MG/ML
50 INJECTION, SOLUTION INTRAMUSCULAR; INTRAVENOUS ONCE
Status: COMPLETED | OUTPATIENT
Start: 2022-11-21 | End: 2022-11-21

## 2022-11-21 RX ORDER — SODIUM CHLORIDE 0.9 % (FLUSH) 0.9 %
10 SYRINGE (ML) INJECTION AS NEEDED
Status: DISPENSED | OUTPATIENT
Start: 2022-11-21 | End: 2022-11-21

## 2022-11-21 RX ADMIN — SODIUM CHLORIDE, PRESERVATIVE FREE 10 ML: 5 INJECTION INTRAVENOUS at 12:35

## 2022-11-21 RX ADMIN — DEXAMETHASONE SODIUM PHOSPHATE 10 MG: 10 INJECTION, SOLUTION INTRAMUSCULAR; INTRAVENOUS at 08:48

## 2022-11-21 RX ADMIN — Medication 500 UNITS: at 12:36

## 2022-11-21 RX ADMIN — SODIUM CHLORIDE, PRESERVATIVE FREE 10 ML: 5 INJECTION INTRAVENOUS at 08:35

## 2022-11-21 RX ADMIN — DIPHENHYDRAMINE HYDROCHLORIDE 50 MG: 50 INJECTION, SOLUTION INTRAMUSCULAR; INTRAVENOUS at 08:41

## 2022-11-21 RX ADMIN — SODIUM CHLORIDE 500 ML: 0.9 INJECTION, SOLUTION INTRAVENOUS at 08:30

## 2022-11-21 RX ADMIN — SODIUM CHLORIDE 660 MG: 9 INJECTION, SOLUTION INTRAVENOUS at 10:00

## 2022-11-21 RX ADMIN — ACETAMINOPHEN 650 MG: 325 TABLET ORAL at 08:41

## 2022-11-21 RX ADMIN — SODIUM CHLORIDE 25 ML/HR: 0.9 INJECTION, SOLUTION INTRAVENOUS at 09:30

## 2022-11-21 NOTE — PROGRESS NOTES
[facility] Progress Note    Date: 2022    Name: Christa Spangler              MRN: [id number]              : 1950    Chemotherapy Cycle:C8D1  Rituxan       Ms. Wilma Weller was assessed and education was provided. Ms. Wilma Weller arrived ambulatory using cane accompanied by her daughter. Ms. Wilma Weller speaks very little english per patient and daughter. Translation services being used via ipad. Treatment education reviewed with Ms. Wilma Weller and daughter. Pt reports non productive cough x2 days. . She denies fever, runny nose any n/v/d. Pt is afebrile and in NAD. Hydration and nutrition encouraged. If any other symptoms arise patient to notify physician. Ms. Gaviota Reyes vitals were reviewed. Visit Vitals  /84 (BP 1 Location: Left arm, BP Patient Position: Sitting)   Pulse 89   Temp 98.6 °F (37 °C)   Resp 16   SpO2 100%       Lab results were obtained and reviewed dated 22. Mediport accessed with 20 gauge 0.75 inch needle without complications. Port flushes without resistance and positive brisk blood return noted. Blood drawn for potassium per orders and sent to lab. Treatment initiated per orders. NS  ml bolus initiated. NS infusing IV 100ml/hr. Pre-medications (Tylenol 650 mg po, Benadryl 50 mg IV) were administered as ordered. .     Rituxan 660 mg was initiated at 100 mg/hr (50 ml/hr) for 30 minutes, increased by 50ml every 30 minutes to max rate of  200 ml/hr per orders without complications. Pt tolerates well, is resting comfortably. Port flushed and de-accessed per orders, per protocol without complications. Band aid applied to site. Ms. Wilma Weller tolerated infusions, and had no complaints at this time. Armband removed and shredded. Ms. Wilma Weller was discharged from [place] [address] in stable condition at 1300. She is to return on 22 at 0800 for her next appointment.     Armando Cody RN  2022  2:06 PM

## 2022-12-07 ENCOUNTER — OFFICE VISIT (OUTPATIENT)
Dept: ONCOLOGY | Age: 72
End: 2022-12-07

## 2022-12-07 VITALS
RESPIRATION RATE: 16 BRPM | OXYGEN SATURATION: 100 % | WEIGHT: 163.2 LBS | SYSTOLIC BLOOD PRESSURE: 130 MMHG | BODY MASS INDEX: 32.9 KG/M2 | HEIGHT: 59 IN | DIASTOLIC BLOOD PRESSURE: 74 MMHG | HEART RATE: 74 BPM

## 2022-12-07 DIAGNOSIS — R11.2 CINV (CHEMOTHERAPY-INDUCED NAUSEA AND VOMITING): ICD-10-CM

## 2022-12-07 DIAGNOSIS — Z79.899 ON ANTINEOPLASTIC CHEMOTHERAPY: ICD-10-CM

## 2022-12-07 DIAGNOSIS — D64.81 ANEMIA ASSOCIATED WITH CHEMOTHERAPY: ICD-10-CM

## 2022-12-07 DIAGNOSIS — C83.35 DIFFUSE LARGE B-CELL LYMPHOMA OF LYMPH NODES OF LOWER EXTREMITY (HCC): Primary | ICD-10-CM

## 2022-12-07 DIAGNOSIS — T45.1X5A ANEMIA ASSOCIATED WITH CHEMOTHERAPY: ICD-10-CM

## 2022-12-07 DIAGNOSIS — T45.1X5A CINV (CHEMOTHERAPY-INDUCED NAUSEA AND VOMITING): ICD-10-CM

## 2022-12-07 RX ORDER — ONDANSETRON 8 MG/1
8 TABLET, ORALLY DISINTEGRATING ORAL
Qty: 45 TABLET | Refills: 0 | Status: SHIPPED | OUTPATIENT
Start: 2022-12-07 | End: 2023-01-06

## 2022-12-07 NOTE — PROGRESS NOTES
Hematology/Oncology  Progress Note    Name: Jamie Aguiar  Date: 2022  : 1950  Primary Care Provider: Irena Royal NP    Ms. Geri Prado is a 74y.o. year old female with Diffuse Large B-cell lymphoma. ,   left breast fibroadenoma as per biopsy,  progrssivley enlarging  abdominal hernia repaired     New PORT on right chest is  well positioned by my palpation     Right medial proximal leg adenopathy has resolved completely. 2022  PET/CT  IMPRESSION   1. Interval resection of large right groin FDG avid mass seen on prior PET/CT. No residual malignant FDG uptake in this region or elsewhere. 2. Low level right hilar FDG avidity, probably reactive. No other sites of  suspicious increased FDG uptake. November 3, 2022  ECHO: Left Ventricle: Normal left ventricular systolic function with a visually estimated EF of 60 - 65%. Current Therapy:   S.p R mini CHOP  had  6 cycles last October 10, 2022                                  Now on  single agent retixumab every 3 weeks 375 mg /msq for 18 cycles    Started 2022  C1  (C7 of the regimen)                                 Continue  Retuximab every 3 weeks. We will provide premedication dexamethasone 10 mg IV prior to each Retuximab. She gets pruritis with it. Subjective:     -- Patient was being followed by Dr. Jazmine Stafford who left the practice. The patient presents to [facility] clinic today for follows up. She was accompanied by her daughter who helped her in translating. She [place] The patient was agreeable to continue planned therapy at [facility]. She reported on-off nausea with therapy. The patient otherwise has no other complaints. Denied fever, chills, night sweat, unintentional weight loss, skin lumps or bumps, acute bleeding or bruising issues.  Denied headache, acute vision change, dizziness, chest pain, worsen shortness of breath, palpitation, productive cough, vomiting, abdominal pain, altered bowel habits, dysuria, new bone pain or back pain, focal numbness or weakness. The past medical, surgical and social history has been reviewed and remains unchanged.    Past Medical History:   Diagnosis Date    Arthritis     Diabetes ([facility] [address]     Hypertension     Lymphoma ([facility] [address]     Right leg     Past Surgical History:   Procedure Laterality Date    COLONOSCOPY N/A 2022    COLONOSCOPY performed by Nano Ribera MD at [facility] ENDOSCOPY    HX BACK SURGERY      HX  SECTION      HX CHOLECYSTECTOMY      2022    HX ENDOSCOPY      EGD 2022    HX HERNIA REPAIR      HX HYSTERECTOMY      IR BX BONE MARROW DIAGNOSTIC  2022    IR INSERT TUNL CVC W PORT OVER 5 YEARS  2022    IR REPAIR CVAD W PORT / PUMP  2022     Social History     Socioeconomic History    Marital status:      Spouse name: Not on file    Number of children: Not on file    Years of education: Not on file    Highest education level: Not on file   Occupational History    Not on file   Tobacco Use    Smoking status: Never    Smokeless tobacco: Never   Vaping Use    Vaping Use: Never used   Substance and Sexual Activity    Alcohol use: Never    Drug use: Never    Sexual activity: Not on file   Other Topics Concern    Not on file   Social History Narrative    Not on file     Social Determinants of Health     Financial Resource Strain: Not on file   Food Insecurity: Not on file   Transportation Needs: Not on file   Physical Activity: Not on file   Stress: Not on file   Social Connections: Not on file   Intimate Partner Violence: Not on file   Housing Stability: Not on file     Family History   Problem Relation Age of Onset    Stroke Mother     Cancer Sister     Diabetes Sister     Hypertension Sister     Dementia Brother      Current Outpatient Medications   Medication Sig Dispense Refill    methylPREDNISolone (MEDROL DOSEPACK) 4 mg tablet Follow instructions per dose pack 1 Dose Pack 0    potassium chloride (K-DUR, KLOR-CON M20) 20 mEq tablet Take 1 Tablet by mouth two (2) times a day for 40 days. Indications: low amount of potassium in the blood, Start today 40 Tablet 1    traZODone (DESYREL) 50 mg tablet Take 1 Tablet by mouth nightly. 30 Tablet 1    omeprazole (PRILOSEC) 40 mg capsule Take 1 Capsule by mouth two (2) times a day for 360 days. Indications: gastroesophageal reflux disease, patient is on high dose prednisone for her lymphoma 180 Capsule 3    oxyCODONE-acetaminophen (Percocet) 5-325 mg per tablet Take 1 Tablet by mouth every four (4) hours as needed for Pain for up to 7 days. Max Daily Amount: 6 Tablets. Indications: pain, severe headache when on the high dose prednison 40 Tablet 0    glyBURIDE (DIABETA) 1.25 mg tablet Take 1 Tablet by mouth Daily (before breakfast). 30 Tablet 1    lidocaine-prilocaine (EMLA) topical cream Apply to port 1/2 hour prior to needle insertion 30 g 5    prochlorperazine (COMPAZINE) 10 mg tablet Take 0.5 Tablets by mouth every six (6) hours as needed for Nausea or Vomiting for up to 180 days. Indications: nausea and vomiting caused by cancer drugs 40 Tablet 5    predniSONE (DELTASONE) 10 mg tablet Take 70 mg by mouth daily for 30 days. Indications: diffuse large B-cell lymphoma (Patient not taking: Reported on 6/28/2022) 35 Tablet 5    therapeutic multivitamin (THERAGRAN) tablet Take 1 Tablet by mouth daily. verapamiL (CALAN) 80 mg tablet Take 1 Tablet by mouth two (2) times a day. 30 Tablet 2    metFORMIN (GLUCOPHAGE) 850 mg tablet Take 1 Tablet by mouth two (2) times a day. 30 Tablet 2    OTHER Take 5 mg by mouth nightly. glibenclamida - diabetes from [place]      OTHER Take 2 mg by mouth nightly. tolterodina - bladder - from [place]      indomethacin (INDOCIN) 25 mg capsule Take 25 mg by mouth two (2) times a day.        Facility-Administered Medications Ordered in Other Visits   Medication Dose Route Frequency Provider Last Rate Last Admin    0.9% sodium chloride infusion  20 mL/hr IntraVENous CONTINUOUS Hans Morrell MD 20 mL/hr at 04/25/22 0924 20 mL/hr at 04/25/22 0924    heparin (porcine) 100 unit/mL injection 500 Units  500 Units InterCATHeter PRN Duarte Burdick MD   500 Units at 04/25/22 1024     Review of Systems   Constitutional:  Positive for malaise/fatigue. Negative for chills, diaphoresis, fever and weight loss. Respiratory:  Negative for cough, hemoptysis, shortness of breath and wheezing. Cardiovascular:  Negative for chest pain, palpitations and leg swelling. Gastrointestinal:  Positive for nausea. Negative for abdominal pain, diarrhea, heartburn and vomiting. Genitourinary:  Negative for dysuria, frequency, hematuria and urgency. Musculoskeletal:  Negative for joint pain and myalgias. Skin:  Negative for itching and rash. Neurological:  Negative for dizziness, seizures, weakness and headaches. Psychiatric/Behavioral:  Negative for depression. The patient does not have insomnia. Objective:   Visit Vitals  /74   Pulse 74   Resp 16   Ht 4' 11\" (1.499 m)   Wt 74 kg (163 lb 3.2 oz)   SpO2 100%   BMI 32.96 kg/m²       ECOG Performance Status (grade): 1  0 - able to carry on all pre-disease activity w/out restriction  1 - restricted but able to carry out light work  2 - ambulatory and can self- care but unable to carry out work  3 - bed or chair >50% of waking hours  4 - completely disable, total care, confined to bed or chair    Physical Exam  Constitutional:       Appearance: Normal appearance. HENT:      Head: Normocephalic and atraumatic. Eyes:      Pupils: Pupils are equal, round, and reactive to light. Cardiovascular:      Rate and Rhythm: Normal rate and regular rhythm. Heart sounds: Normal heart sounds. Pulmonary:      Effort: Pulmonary effort is normal.      Breath sounds: Normal breath sounds. Abdominal:      General: Bowel sounds are normal.      Palpations: Abdomen is soft. Tenderness: There is no abdominal tenderness.  There is no guarding. Musculoskeletal:         General: Normal range of motion. Cervical back: Neck supple. Right lower leg: No edema. Left lower leg: No edema. Skin:     General: Skin is warm. Neurological:      General: No focal deficit present. Mental Status: She is alert and oriented to person, place, and time. Mental status is at baseline. Diagnostics:      No results found for this or any previous visit (from the past 96 hour(s)). Imaging:  Results for orders placed during the hospital encounter of 06/28/22    IR REPAIR CVAD W PORT / PUMP    Narrative  PROCEDURE:    CHEST PORT REVISION    ATTENDING: Stanislav Morales M.D. COMPLICATIONS: None. MEDICATIONS: 1% lidocaine, subcutaneous. IV fentanyl and Versed were used for  conscious sedation. Monitoring performed by nursing staff for 30 minutes. INDICATION:  Right Mediport, flips. PROCEDURE:    Informed consent was obtained where the risks, benefits and alternatives to the  procedure were explained. All elements of maximal sterile barrier technique followed including: cap and  mask and sterile gown and sterile gloves and a large sterile sheet and hand  hygiene and 2% chlorhexidine for cutaneous antisepsis (or acceptable alternative  antiseptics, per current guidelines). Following subcutaneous infiltration of lidocaine, an incision was made via the  existing scar. Then, following blunt dissection the port was accessed and  repositioned appropriately. Then, the [employer] power port was sutured in  place with single nonabsorbable suture. The catheter was flushed in situ, with  saline solution. The port into appropriate position. The single short incision  was closed with absorbable sutures then bandaged. No pneumothorax. The patient tolerated the procedure well and was transferred from the IR suite  in stable condition. Impression  Right chest port revision as above. Port is ready for immediate use.       No results found for this or any previous visit. Results for orders placed during the hospital encounter of 03/16/22    CT ABD PELV W CONT    Narrative  CT ABDOMEN AND PELVIS WITH CONTRAST    COMPARISON: None. INDICATIONS: Lymphoma. TECHNIQUE:  Following the uneventful administration of oral and 100cc of Isovue  300 intravenous contrast , volumetric data acquisition was performed of the  abdomen and pelvis on a multislice scanner and reconstructed in axial coronal  and sagittal planes    CT ABDOMEN FINDINGS:    Lung Bases: No focal hepatic lesions are evident. No biliary dilation. The  gallbladder is surgically absent. .    Liver/Gallbladder/Biliary: Normal.    Spleen: Normal. Tiny accessory splenule noted    Adrenal Glands: Normal.    Kidneys: Normal.    Pancreas: Normal.    Stomach, Small Bowel,  and Colon: Diverticulosis without findings of  diverticulitis. . A nonobstructed segment of transverse colon extends into a  ventral hernia just to the right of the umbilicus    Lymph Nodes: Normal in size and number in the abdomen and within the pelvis. A  right groin mass, apparently luisito histologically, is present measuring 7 x 6 x  9 cm. The abdominal aorta is unremarkable. The IVC is unremarkable. Peritoneal Spaces: No free fluid or free air is present. Abdominal wall: Umbilical region hernia containing transverse colon segment    Bladder: Unremarkable. The uterus is surgically absent. The left ovary is surgically absent. The right  ovary contains a 3.5 x 4 cm cyst.    Osseous Structures Of Abdomen And Pelvis: Unremarkable for age.     Impression  Right groin mass  Simple appearing right ovarian cyst  Diverticulosis  Ventral hernia containing nonobstructed segment of transverse colon      All CT scans at this facility are performed using dose optimization technique as  appropriate to the performed exam, to include automated exposure control,  adjustment of the mA and/or kV according to patient's size (Including  appropriate matching for site-specific examinations), or use of iterative  reconstruction technique. Diagnosis:     1. Diffuse large B-cell lymphoma of lymph nodes of lower extremity (HCC)    2. CINV (chemotherapy-induced nausea and vomiting)    3. On antineoplastic chemotherapy    4. Anemia associated with chemotherapy          Assessment and Plan:     -- Patient was being followed by Dr. Stephany West who left the practice. The patient presents to [facility] clinic today for follows up. She was accompanied by her daughter. The patient was agreeable to continue planned therapy at [facility]. -- Completed  R mini CHOP, six cycles  POST R mini CHOP continue single agent Rituximab 375mg/msq every 3 weeks for 18 cycles as patient has a luis miguel risk lymphoma  -- Added dexamethasone 10 mg iv as premedication prior to each Retuximab due to pruritis. -- ECHO good and PET/CT CR  -- She will continue Rituximab as planned. She has tolerated therapy without high graded toxicities. -- CINV: Zofran PRN  -- Anemia: will monitor CBC, Iron profile, ferritin, B12/folate and replacement as indicated  -- The patient and her daughter had multiple questions which answered at best satisfaction. The patient was in agreement with the plan. -- We will see the patient back in about 6 weeks. Always sooner if required. Orders Placed This Encounter    ondansetron (ZOFRAN ODT) 8 mg disintegrating tablet     Sig: Take 1 Tablet by mouth every eight (8) hours as needed for Nausea or Vomiting for up to 30 days. Dispense:  45 Tablet     Refill:  0             MsErnie Feldman has a reminder for a \"due or due soon\" health maintenance. I have asked that she contact her primary care provider for follow-up on this health maintenance. All of patient's questions answered to their apparent satisfaction. They verbally show understanding and agreement with aforementioned plan.          Mandeep Everett MD  12/7/2022          Above mentioned total time spent for this encounter with more than 50% of the time spent in face-to-face counseling, discussing on diagnosis and management plan going forward, and co-ordination of care. Parts of this document has been produced using Dragon dictation system. Unrecognized errors in transcription may be present. Please do not hesitate to reach out for any questions or clarifications.       CC: Dougie Paul NP

## 2022-12-08 DIAGNOSIS — C83.30 DIFFUSE LARGE B-CELL LYMPHOMA, UNSPECIFIED BODY REGION (HCC): Primary | ICD-10-CM

## 2022-12-08 RX ORDER — EPINEPHRINE 1 MG/ML
0.3 INJECTION, SOLUTION, CONCENTRATE INTRAVENOUS AS NEEDED
OUTPATIENT
Start: 2022-12-12

## 2022-12-08 RX ORDER — DIPHENHYDRAMINE HYDROCHLORIDE 50 MG/ML
50 INJECTION, SOLUTION INTRAMUSCULAR; INTRAVENOUS ONCE
OUTPATIENT
Start: 2022-12-12 | End: 2022-12-12

## 2022-12-08 RX ORDER — SODIUM CHLORIDE 0.9 % (FLUSH) 0.9 %
10 SYRINGE (ML) INJECTION AS NEEDED
OUTPATIENT
Start: 2022-12-12

## 2022-12-08 RX ORDER — SODIUM CHLORIDE 9 MG/ML
100 INJECTION, SOLUTION INTRAVENOUS CONTINUOUS
OUTPATIENT
Start: 2022-12-12

## 2022-12-08 RX ORDER — SODIUM CHLORIDE 9 MG/ML
25 INJECTION, SOLUTION INTRAVENOUS CONTINUOUS
OUTPATIENT
Start: 2022-12-12

## 2022-12-08 RX ORDER — HEPARIN 100 UNIT/ML
300-500 SYRINGE INTRAVENOUS AS NEEDED
OUTPATIENT
Start: 2022-12-12

## 2022-12-08 RX ORDER — DIPHENHYDRAMINE HYDROCHLORIDE 50 MG/ML
50 INJECTION, SOLUTION INTRAMUSCULAR; INTRAVENOUS AS NEEDED
Start: 2022-12-12

## 2022-12-08 RX ORDER — ALBUTEROL SULFATE 0.83 MG/ML
2.5 SOLUTION RESPIRATORY (INHALATION) AS NEEDED
Start: 2022-12-12

## 2022-12-08 RX ORDER — ONDANSETRON 2 MG/ML
8 INJECTION INTRAMUSCULAR; INTRAVENOUS AS NEEDED
OUTPATIENT
Start: 2022-12-12

## 2022-12-08 RX ORDER — HYDROCORTISONE SODIUM SUCCINATE 100 MG/2ML
100 INJECTION, POWDER, FOR SOLUTION INTRAMUSCULAR; INTRAVENOUS AS NEEDED
OUTPATIENT
Start: 2022-12-12

## 2022-12-08 RX ORDER — ACETAMINOPHEN 325 MG/1
650 TABLET ORAL ONCE
OUTPATIENT
Start: 2022-12-12 | End: 2022-12-12

## 2022-12-08 RX ORDER — DIPHENHYDRAMINE HYDROCHLORIDE 50 MG/ML
25 INJECTION, SOLUTION INTRAMUSCULAR; INTRAVENOUS AS NEEDED
Start: 2022-12-12

## 2022-12-08 RX ORDER — SODIUM CHLORIDE 9 MG/ML
10 INJECTION INTRAMUSCULAR; INTRAVENOUS; SUBCUTANEOUS AS NEEDED
OUTPATIENT
Start: 2022-12-12

## 2022-12-08 RX ORDER — ACETAMINOPHEN 325 MG/1
650 TABLET ORAL AS NEEDED
Start: 2022-12-12

## 2022-12-09 ENCOUNTER — HOSPITAL ENCOUNTER (OUTPATIENT)
Dept: INFUSION THERAPY | Age: 72
Discharge: HOME | End: 2022-12-09

## 2022-12-09 VITALS
TEMPERATURE: 98.8 F | RESPIRATION RATE: 16 BRPM | SYSTOLIC BLOOD PRESSURE: 134 MMHG | DIASTOLIC BLOOD PRESSURE: 71 MMHG | HEART RATE: 83 BPM | OXYGEN SATURATION: 100 %

## 2022-12-09 DIAGNOSIS — R22.41 LUMP OF SKIN OF RIGHT LOWER EXTREMITY: Primary | ICD-10-CM

## 2022-12-09 LAB
ALBUMIN SERPL-MCNC: 3.4 G/DL (ref 3.4–5)
ALBUMIN/GLOB SERPL: 1.2 {RATIO} (ref 0.8–1.7)
ALP SERPL-CCNC: 78 U/L (ref 45–117)
ALT SERPL-CCNC: 45 U/L (ref 13–56)
ANION GAP SERPL CALC-SCNC: 7 MMOL/L (ref 3–18)
AST SERPL-CCNC: 32 U/L (ref 10–38)
BASO+EOS+MONOS # BLD AUTO: 0.9 K/UL (ref 0–2.3)
BASO+EOS+MONOS NFR BLD AUTO: 12 % (ref 0.1–17)
BILIRUB SERPL-MCNC: 0.3 MG/DL (ref 0.2–1)
BUN SERPL-MCNC: 14 MG/DL (ref 7–18)
BUN/CREAT SERPL: 17 (ref 12–20)
CALCIUM SERPL-MCNC: 9.1 MG/DL (ref 8.5–10.1)
CHLORIDE SERPL-SCNC: 101 MMOL/L (ref 100–111)
CO2 SERPL-SCNC: 29 MMOL/L (ref 21–32)
CREAT SERPL-MCNC: 0.83 MG/DL (ref 0.6–1.3)
DIFFERENTIAL METHOD BLD: ABNORMAL
ERYTHROCYTE [DISTWIDTH] IN BLOOD BY AUTOMATED COUNT: 13.4 % (ref 11.5–14.5)
GLOBULIN SER CALC-MCNC: 2.8 G/DL (ref 2–4)
GLUCOSE SERPL-MCNC: 166 MG/DL (ref 74–99)
HCT VFR BLD AUTO: 34.4 % (ref 36–48)
HGB BLD-MCNC: 10.9 G/DL (ref 12–16)
LYMPHOCYTES # BLD: 0.8 K/UL (ref 1.1–5.9)
LYMPHOCYTES NFR BLD: 11 % (ref 14–44)
MCH RBC QN AUTO: 26.7 PG (ref 25–35)
MCHC RBC AUTO-ENTMCNC: 31.7 G/DL (ref 31–37)
MCV RBC AUTO: 84.1 FL (ref 78–102)
NEUTS SEG # BLD: 5.4 K/UL (ref 1.8–9.5)
NEUTS SEG NFR BLD: 77 % (ref 40–70)
PLATELET # BLD AUTO: 214 K/UL (ref 140–440)
POTASSIUM SERPL-SCNC: 3.3 MMOL/L (ref 3.5–5.5)
PROT SERPL-MCNC: 6.2 G/DL (ref 6.4–8.2)
RBC # BLD AUTO: 4.09 M/UL (ref 4.1–5.1)
SODIUM SERPL-SCNC: 137 MMOL/L (ref 136–145)
WBC # BLD AUTO: 7.1 K/UL (ref 4.5–13)

## 2022-12-09 PROCEDURE — 80053 COMPREHEN METABOLIC PANEL: CPT

## 2022-12-09 PROCEDURE — 36591 DRAW BLOOD OFF VENOUS DEVICE: CPT

## 2022-12-09 PROCEDURE — 77030012965 HC NDL HUBR BBMI -A

## 2022-12-09 PROCEDURE — 85025 COMPLETE CBC W/AUTO DIFF WBC: CPT

## 2022-12-09 PROCEDURE — 74011250636 HC RX REV CODE- 250/636: Performed by: INTERNAL MEDICINE

## 2022-12-09 PROCEDURE — 74011000250 HC RX REV CODE- 250: Performed by: INTERNAL MEDICINE

## 2022-12-09 RX ORDER — SODIUM CHLORIDE 9 MG/ML
10 INJECTION INTRAMUSCULAR; INTRAVENOUS; SUBCUTANEOUS AS NEEDED
Status: DISCONTINUED | OUTPATIENT
Start: 2022-12-09 | End: 2022-12-10 | Stop reason: HOSPADM

## 2022-12-09 RX ORDER — HEPARIN 100 UNIT/ML
500 SYRINGE INTRAVENOUS ONCE
Status: COMPLETED | OUTPATIENT
Start: 2022-12-09 | End: 2022-12-09

## 2022-12-09 RX ADMIN — SODIUM CHLORIDE, PRESERVATIVE FREE 10 ML: 5 INJECTION INTRAVENOUS at 09:48

## 2022-12-09 RX ADMIN — HEPARIN 500 UNITS: 100 SYRINGE at 09:59

## 2022-12-09 RX ADMIN — SODIUM CHLORIDE, PRESERVATIVE FREE 20 ML: 5 INJECTION INTRAVENOUS at 09:58

## 2022-12-09 NOTE — PROGRESS NOTES
[facility] Progress Note    Date: 2022    Name: Derik Walton              MRN: [id number]              : 1950    Pre-Chemo labs    Ms. Freda Baker was assessed and education was provided. Ms. Freda Baker arrived ambulatory using cane accompanied by her daughter. Translation services being used via ipad. Treatment education reviewed with Ms. Freda Baker and daughter. Ms. Kacy Lomax vitals were reviewed. Visit Vitals  /71 (BP 1 Location: Left upper arm, BP Patient Position: Sitting)   Pulse 83   Temp 98.8 °F (37.1 °C)   Resp 16   SpO2 100%   Breastfeeding No       Lab results were obtained and reviewed. Recent Results (from the past 12 hour(s))   CBC WITH 3 PART DIFF    Collection Time: 22  9:55 AM   Result Value Ref Range    WBC 7.1 4.5 - 13.0 K/uL    RBC 4.09 (L) 4.10 - 5.10 M/uL    HGB 10.9 (L) 12.0 - 16.0 g/dL    HCT 34.4 (L) 36 - 48 %    MCV 84.1 78 - 102 FL    MCH 26.7 25.0 - 35.0 PG    MCHC 31.7 31 - 37 g/dL    RDW 13.4 11.5 - 14.5 %    PLATELET 174 160 - 844 K/uL    NEUTROPHILS 77 (H) 40 - 70 %    Mixed cells 12 0.1 - 17 %    LYMPHOCYTES 11 (L) 14 - 44 %    ABS. NEUTROPHILS 5.4 1.8 - 9.5 K/UL    ABS. MIXED CELLS 0.9 0.0 - 2.3 K/uL    ABS. LYMPHOCYTES 0.8 (L) 1.1 - 5.9 K/UL    DF AUTOMATED         Right chest medi-port accessed and blood drawn per orders, per protocol without complications. Port flushed and de-accessed per orders, per protocol without complications. Band aid applied to site     Ms. Freda Baker tolerated the blood draw, and had no complaints at this time. Armband removed and shredded. Ms. Freda Baker was discharged from [place] [address] in stable condition at [address] She is to return on 22 at 0800 for her next appointment.     Jes Hyde RN  2022

## 2022-12-12 ENCOUNTER — HOSPITAL ENCOUNTER (OUTPATIENT)
Dept: INFUSION THERAPY | Age: 72
Discharge: HOME | End: 2022-12-12

## 2022-12-12 VITALS
OXYGEN SATURATION: 100 % | HEART RATE: 75 BPM | SYSTOLIC BLOOD PRESSURE: 132 MMHG | DIASTOLIC BLOOD PRESSURE: 70 MMHG | TEMPERATURE: 98.4 F | RESPIRATION RATE: 16 BRPM

## 2022-12-12 DIAGNOSIS — R22.41 LUMP OF SKIN OF RIGHT LOWER EXTREMITY: ICD-10-CM

## 2022-12-12 DIAGNOSIS — C83.30 DIFFUSE LARGE B-CELL LYMPHOMA, UNSPECIFIED BODY REGION (HCC): Primary | ICD-10-CM

## 2022-12-12 PROCEDURE — 74011250636 HC RX REV CODE- 250/636: Performed by: INTERNAL MEDICINE

## 2022-12-12 PROCEDURE — 96413 CHEMO IV INFUSION 1 HR: CPT

## 2022-12-12 PROCEDURE — 77030012965 HC NDL HUBR BBMI -A

## 2022-12-12 PROCEDURE — 96417 CHEMO IV INFUS EACH ADDL SEQ: CPT

## 2022-12-12 PROCEDURE — 96366 THER/PROPH/DIAG IV INF ADDON: CPT

## 2022-12-12 PROCEDURE — 96375 TX/PRO/DX INJ NEW DRUG ADDON: CPT

## 2022-12-12 PROCEDURE — 74011000250 HC RX REV CODE- 250: Performed by: INTERNAL MEDICINE

## 2022-12-12 PROCEDURE — 74011000258 HC RX REV CODE- 258: Performed by: INTERNAL MEDICINE

## 2022-12-12 PROCEDURE — 74011250637 HC RX REV CODE- 250/637: Performed by: INTERNAL MEDICINE

## 2022-12-12 RX ORDER — SODIUM CHLORIDE 9 MG/ML
100 INJECTION, SOLUTION INTRAVENOUS CONTINUOUS
Status: DISPENSED | OUTPATIENT
Start: 2022-12-12 | End: 2022-12-12

## 2022-12-12 RX ORDER — ACETAMINOPHEN 325 MG/1
650 TABLET ORAL ONCE
Status: COMPLETED | OUTPATIENT
Start: 2022-12-12 | End: 2022-12-12

## 2022-12-12 RX ORDER — DIPHENHYDRAMINE HYDROCHLORIDE 50 MG/ML
50 INJECTION, SOLUTION INTRAMUSCULAR; INTRAVENOUS ONCE
Status: COMPLETED | OUTPATIENT
Start: 2022-12-12 | End: 2022-12-12

## 2022-12-12 RX ORDER — HEPARIN 100 UNIT/ML
300-500 SYRINGE INTRAVENOUS AS NEEDED
Status: DISPENSED | OUTPATIENT
Start: 2022-12-12 | End: 2022-12-12

## 2022-12-12 RX ORDER — SODIUM CHLORIDE 0.9 % (FLUSH) 0.9 %
10 SYRINGE (ML) INJECTION AS NEEDED
Status: DISPENSED | OUTPATIENT
Start: 2022-12-12 | End: 2022-12-12

## 2022-12-12 RX ADMIN — SODIUM CHLORIDE, PRESERVATIVE FREE 10 ML: 5 INJECTION INTRAVENOUS at 10:05

## 2022-12-12 RX ADMIN — DEXAMETHASONE SODIUM PHOSPHATE 10 MG: 10 INJECTION, SOLUTION INTRAMUSCULAR; INTRAVENOUS at 09:12

## 2022-12-12 RX ADMIN — DIPHENHYDRAMINE HYDROCHLORIDE 50 MG: 50 INJECTION INTRAMUSCULAR; INTRAVENOUS at 09:11

## 2022-12-12 RX ADMIN — SODIUM CHLORIDE, PRESERVATIVE FREE 10 ML: 5 INJECTION INTRAVENOUS at 13:31

## 2022-12-12 RX ADMIN — SODIUM CHLORIDE 660 MG: 0.9 INJECTION, SOLUTION INTRAVENOUS at 10:17

## 2022-12-12 RX ADMIN — ACETAMINOPHEN 650 MG: 325 TABLET ORAL at 09:11

## 2022-12-12 RX ADMIN — SODIUM CHLORIDE 500 ML: 0.9 INJECTION, SOLUTION INTRAVENOUS at 09:07

## 2022-12-12 RX ADMIN — HEPARIN 500 UNITS: 100 SYRINGE at 13:38

## 2022-12-12 NOTE — PROGRESS NOTES
[facility] Progress Note    Date: 2022    Name: Keon Montejo              MRN: [id number]              : 1950    Chemotherapy Cycle:C9 D1  Rituxan       Ms. Polo Toure was assessed and education was provided. Ms. Polo Toure arrived ambulatory using cane accompanied by her daughter. Ms. Polo Toure speaks very little english per patient and daughter. Translation services used via ipad as needed. Treatment education reviewed with Ms. Polo Toure and daughter. She denies fever, runny nose any n/v/d. Pt is afebrile and in NAD. Hydration and nutrition encouraged. If any other symptoms arise patient to notify physician. Ms. Osmin Tamayo vitals were reviewed. Visit Vitals  /76 (BP 1 Location: Left upper arm, BP Patient Position: Sitting)   Pulse 80   Temp 98.6 °F (37 °C)   Resp 16   SpO2 100%       Lab results were obtained and reviewed dated 22. Mediport accessed with 20 gauge 0.75 inch needle without complications. Port flushes without resistance and positive brisk blood return noted. Treatment initiated per orders. NS  ml bolus initiated. NS infusing IV 100ml/hr. Pre-medications (Tylenol 650 mg po, Benadryl 50 mg IV, Decadron 10mg IV) were administered as ordered. .     Rituxan 660 mg was initiated at 100 mg/hr (50 ml/hr) for 30 minutes, increased by 50ml every 30 minutes to max rate of  200 ml/hr per orders without complications. Pt tolerates well, is resting comfortably. Port flushed and de-accessed per orders, per protocol without complications. Band aid applied to site. Ms. Polo Toure tolerated infusions, and had no complaints at this time. Armband removed and shredded. Ms. Polo Toure was discharged from [place] [address] in stable condition at [phone]. She is to return on 23 at 0800 for her next appointment.     Ria Leonard RN  2022

## 2022-12-28 RX ORDER — SODIUM CHLORIDE 9 MG/ML
10 INJECTION INTRAVENOUS AS NEEDED
OUTPATIENT
Start: 2023-01-04

## 2022-12-28 RX ORDER — ACETAMINOPHEN 325 MG/1
650 TABLET ORAL AS NEEDED
Start: 2023-01-04

## 2022-12-28 RX ORDER — EPINEPHRINE 1 MG/ML
0.3 INJECTION, SOLUTION, CONCENTRATE INTRAVENOUS AS NEEDED
OUTPATIENT
Start: 2023-01-04

## 2022-12-28 RX ORDER — DIPHENHYDRAMINE HYDROCHLORIDE 50 MG/ML
25 INJECTION, SOLUTION INTRAMUSCULAR; INTRAVENOUS AS NEEDED
Start: 2023-01-04

## 2022-12-28 RX ORDER — HYDROCORTISONE SODIUM SUCCINATE 100 MG/2ML
100 INJECTION, POWDER, FOR SOLUTION INTRAMUSCULAR; INTRAVENOUS AS NEEDED
OUTPATIENT
Start: 2023-01-04

## 2022-12-28 RX ORDER — ONDANSETRON 2 MG/ML
8 INJECTION INTRAMUSCULAR; INTRAVENOUS AS NEEDED
OUTPATIENT
Start: 2023-01-04

## 2022-12-28 RX ORDER — DIPHENHYDRAMINE HYDROCHLORIDE 50 MG/ML
50 INJECTION, SOLUTION INTRAMUSCULAR; INTRAVENOUS AS NEEDED
Start: 2023-01-04

## 2022-12-28 RX ORDER — ALBUTEROL SULFATE 0.83 MG/ML
2.5 SOLUTION RESPIRATORY (INHALATION) AS NEEDED
Start: 2023-01-04

## 2023-01-03 ENCOUNTER — HOSPITAL ENCOUNTER (OUTPATIENT)
Dept: INFUSION THERAPY | Age: 73
Discharge: HOME | End: 2023-01-03

## 2023-01-03 VITALS
WEIGHT: 166.3 LBS | BODY MASS INDEX: 33.59 KG/M2 | TEMPERATURE: 98.6 F | RESPIRATION RATE: 16 BRPM | SYSTOLIC BLOOD PRESSURE: 163 MMHG | DIASTOLIC BLOOD PRESSURE: 85 MMHG | OXYGEN SATURATION: 100 % | HEART RATE: 84 BPM

## 2023-01-03 LAB
ALBUMIN SERPL-MCNC: 3 G/DL (ref 3.4–5)
ALBUMIN/GLOB SERPL: 1.1 (ref 0.8–1.7)
ALP SERPL-CCNC: 89 U/L (ref 45–117)
ALT SERPL-CCNC: 38 U/L (ref 13–56)
ANION GAP SERPL CALC-SCNC: 6 MMOL/L (ref 3–18)
AST SERPL-CCNC: 18 U/L (ref 10–38)
BASO+EOS+MONOS # BLD AUTO: 0.6 K/UL (ref 0–2.3)
BASO+EOS+MONOS NFR BLD AUTO: 11 % (ref 0.1–17)
BILIRUB SERPL-MCNC: 0.3 MG/DL (ref 0.2–1)
BUN SERPL-MCNC: 16 MG/DL (ref 7–18)
BUN/CREAT SERPL: 22 (ref 12–20)
CALCIUM SERPL-MCNC: 8.3 MG/DL (ref 8.5–10.1)
CHLORIDE SERPL-SCNC: 107 MMOL/L (ref 100–111)
CO2 SERPL-SCNC: 26 MMOL/L (ref 21–32)
CREAT SERPL-MCNC: 0.74 MG/DL (ref 0.6–1.3)
DIFFERENTIAL METHOD BLD: ABNORMAL
ERYTHROCYTE [DISTWIDTH] IN BLOOD BY AUTOMATED COUNT: 13.7 % (ref 11.5–14.5)
GLOBULIN SER CALC-MCNC: 2.8 G/DL (ref 2–4)
GLUCOSE SERPL-MCNC: 152 MG/DL (ref 74–99)
HCT VFR BLD AUTO: 33.8 % (ref 36–48)
HGB BLD-MCNC: 11 G/DL (ref 12–16)
LYMPHOCYTES # BLD: 1 K/UL (ref 1.1–5.9)
LYMPHOCYTES NFR BLD: 18 % (ref 14–44)
MCH RBC QN AUTO: 26.6 PG (ref 25–35)
MCHC RBC AUTO-ENTMCNC: 32.5 G/DL (ref 31–37)
MCV RBC AUTO: 81.8 FL (ref 78–102)
NEUTS SEG # BLD: 4 K/UL (ref 1.8–9.5)
NEUTS SEG NFR BLD: 71 % (ref 40–70)
PLATELET # BLD AUTO: 212 K/UL (ref 140–440)
POTASSIUM SERPL-SCNC: 3.6 MMOL/L (ref 3.5–5.5)
PROT SERPL-MCNC: 5.8 G/DL (ref 6.4–8.2)
RBC # BLD AUTO: 4.13 M/UL (ref 4.1–5.1)
SODIUM SERPL-SCNC: 139 MMOL/L (ref 136–145)
WBC # BLD AUTO: 5.6 K/UL (ref 4.5–13)

## 2023-01-03 PROCEDURE — 36591 DRAW BLOOD OFF VENOUS DEVICE: CPT

## 2023-01-03 PROCEDURE — 85025 COMPLETE CBC W/AUTO DIFF WBC: CPT

## 2023-01-03 PROCEDURE — 74011250636 HC RX REV CODE- 250/636: Performed by: INTERNAL MEDICINE

## 2023-01-03 PROCEDURE — 77030012965 HC NDL HUBR BBMI -A

## 2023-01-03 PROCEDURE — 74011000250 HC RX REV CODE- 250: Performed by: INTERNAL MEDICINE

## 2023-01-03 PROCEDURE — 80053 COMPREHEN METABOLIC PANEL: CPT

## 2023-01-03 RX ORDER — SODIUM CHLORIDE 0.9 % (FLUSH) 0.9 %
5-10 SYRINGE (ML) INJECTION AS NEEDED
Status: DISCONTINUED | OUTPATIENT
Start: 2023-01-03 | End: 2023-01-05 | Stop reason: HOSPADM

## 2023-01-03 RX ORDER — HEPARIN 100 UNIT/ML
500 SYRINGE INTRAVENOUS ONCE
Status: COMPLETED | OUTPATIENT
Start: 2023-01-03 | End: 2023-01-03

## 2023-01-03 RX ADMIN — SODIUM CHLORIDE, PRESERVATIVE FREE 10 ML: 5 INJECTION INTRAVENOUS at 08:26

## 2023-01-03 RX ADMIN — HEPARIN 500 UNITS: 100 SYRINGE at 08:26

## 2023-01-03 NOTE — PROGRESS NOTES
[facility] Progress Note    Date: January 3, 2023    Name: Lydia Giron    MRN: [id number]         : 1950    Pre chemo Labs    Ms. Pascual Mast to [facility], ambulatory at 5894 accompanied by daughter. Pt was assessed and education was provided. Ms. Odilon Quiroga vitals were reviewed. Visit Vitals  BP (!) 163/85 (BP 1 Location: Left upper arm)   Pulse 84   Temp 98.6 °F (37 °C)   Resp 16   Wt 75.4 kg (166 lb 4.8 oz)   SpO2 100%   Breastfeeding No   BMI 33.59 kg/m²       Right mediport accessed with 20g 1 inch dinh needle. Blood drawn off and sent to lab for CMP and CBC (CBC processed on site) after 10 ml waste per written orders. Mediport flushed with NS 10 ml and Heparin 500 units. Pt deaccessed, bandaid applied. Ms. Pascual Mast tolerated procedure, and had no complaints at this time. Patient armband removed and shredded. Ms. Pascual Mast was discharged from [place] [address] in stable condition at 0830. She is to return on 23 at 0800 for her chemo appointment.     Daysi Chan RN  January 3, 2023

## 2023-01-04 ENCOUNTER — HOSPITAL ENCOUNTER (OUTPATIENT)
Dept: INFUSION THERAPY | Age: 73
Discharge: HOME | End: 2023-01-04

## 2023-01-04 VITALS
BODY MASS INDEX: 33.47 KG/M2 | TEMPERATURE: 98 F | HEIGHT: 59 IN | DIASTOLIC BLOOD PRESSURE: 82 MMHG | WEIGHT: 166 LBS | OXYGEN SATURATION: 98 % | HEART RATE: 89 BPM | RESPIRATION RATE: 16 BRPM | SYSTOLIC BLOOD PRESSURE: 139 MMHG

## 2023-01-04 DIAGNOSIS — R22.41 LUMP OF SKIN OF RIGHT LOWER EXTREMITY: ICD-10-CM

## 2023-01-04 DIAGNOSIS — C83.30 DIFFUSE LARGE B-CELL LYMPHOMA, UNSPECIFIED BODY REGION (HCC): Primary | ICD-10-CM

## 2023-01-04 PROCEDURE — 74011000258 HC RX REV CODE- 258: Performed by: INTERNAL MEDICINE

## 2023-01-04 PROCEDURE — 96413 CHEMO IV INFUSION 1 HR: CPT

## 2023-01-04 PROCEDURE — 96375 TX/PRO/DX INJ NEW DRUG ADDON: CPT

## 2023-01-04 PROCEDURE — 77030012965 HC NDL HUBR BBMI -A

## 2023-01-04 PROCEDURE — 74011250636 HC RX REV CODE- 250/636: Performed by: INTERNAL MEDICINE

## 2023-01-04 PROCEDURE — 96367 TX/PROPH/DG ADDL SEQ IV INF: CPT

## 2023-01-04 PROCEDURE — 74011000250 HC RX REV CODE- 250: Performed by: INTERNAL MEDICINE

## 2023-01-04 PROCEDURE — 96415 CHEMO IV INFUSION ADDL HR: CPT

## 2023-01-04 PROCEDURE — 74011250637 HC RX REV CODE- 250/637: Performed by: INTERNAL MEDICINE

## 2023-01-04 RX ORDER — HEPARIN 100 UNIT/ML
300-500 SYRINGE INTRAVENOUS AS NEEDED
Status: DISPENSED | OUTPATIENT
Start: 2023-01-04 | End: 2023-01-04

## 2023-01-04 RX ORDER — SODIUM CHLORIDE 9 MG/ML
25 INJECTION, SOLUTION INTRAVENOUS CONTINUOUS
Status: DISPENSED | OUTPATIENT
Start: 2023-01-04 | End: 2023-01-04

## 2023-01-04 RX ORDER — DIPHENHYDRAMINE HYDROCHLORIDE 50 MG/ML
50 INJECTION, SOLUTION INTRAMUSCULAR; INTRAVENOUS ONCE
Status: COMPLETED | OUTPATIENT
Start: 2023-01-04 | End: 2023-01-04

## 2023-01-04 RX ORDER — SODIUM CHLORIDE 0.9 % (FLUSH) 0.9 %
10 SYRINGE (ML) INJECTION AS NEEDED
Status: DISPENSED | OUTPATIENT
Start: 2023-01-04 | End: 2023-01-04

## 2023-01-04 RX ORDER — SODIUM CHLORIDE 9 MG/ML
100 INJECTION, SOLUTION INTRAVENOUS CONTINUOUS
Status: DISPENSED | OUTPATIENT
Start: 2023-01-04 | End: 2023-01-04

## 2023-01-04 RX ORDER — ACETAMINOPHEN 325 MG/1
650 TABLET ORAL ONCE
Status: COMPLETED | OUTPATIENT
Start: 2023-01-04 | End: 2023-01-04

## 2023-01-04 RX ADMIN — DEXAMETHASONE SODIUM PHOSPHATE 10 MG: 10 INJECTION, SOLUTION INTRAMUSCULAR; INTRAVENOUS at 08:40

## 2023-01-04 RX ADMIN — SODIUM CHLORIDE 500 ML: 9 INJECTION, SOLUTION INTRAVENOUS at 08:25

## 2023-01-04 RX ADMIN — SODIUM CHLORIDE 660 MG: 0.9 INJECTION, SOLUTION INTRAVENOUS at 09:30

## 2023-01-04 RX ADMIN — SODIUM CHLORIDE, PRESERVATIVE FREE 10 ML: 5 INJECTION INTRAVENOUS at 08:25

## 2023-01-04 RX ADMIN — DIPHENHYDRAMINE HYDROCHLORIDE 50 MG: 50 INJECTION INTRAMUSCULAR; INTRAVENOUS at 08:35

## 2023-01-04 RX ADMIN — ACETAMINOPHEN 650 MG: 325 TABLET ORAL at 08:33

## 2023-01-04 NOTE — PROGRESS NOTES
[facility] Progress Note    Date: 2023    Name: Samina Stein              MRN: [id number]              : 1950    Chemotherapy Cycle:C10D1  Rituxan          Ms. Sanjeev Sepulveda was assessed and education was provided. Ms. Sanjeev Sepulveda arrived ambulatory using cane accompanied by her daughter. Translation services being used via ipad. Treatment education reviewed with Ms. Sanjeev Sepulveda and daughter. Ms. Lety Brunson vitals were reviewed. Visit Vitals  BP (!) 160/80 (BP 1 Location: Left upper arm, BP Patient Position: Sitting)   Pulse 88   Temp 98.4 °F (36.9 °C)   Resp 16   Ht 4' 11\" (1.499 m)   Wt 75.3 kg (166 lb)   SpO2 97%   Breastfeeding No   BMI 33.53 kg/m²     Pre-medications (Tylenol 650 mg po, Benadryl 50 mg IV) were administered as ordered. .     Rituxan 660 mg was initiated at 100 mg/hr (50 ml/hr) for 30 minutes, increased by 50ml every 30 minutes to max rate of  200 ml/hr per orders without complications. Pt tolerates well, is resting comfortably. Port flushed and de-accessed per orders, per protocol without complications. Band aid applied to site. Ms. Sanjeev Sepulveda tolerated infusions, and had no complaints at this time. Armband removed and shredded. Mediport accessed with 20 gauge 0.75 inch needle without complications. Port flushes without resistance and positive brisk blood return noted    Ms. Sanjeev Sepulveda was discharged from [place] [address] in stable condition at 1230. She is to return on *** at *** for her next appointment.     Handy Akers RN  2023  8:47 AM

## 2023-01-16 RX ORDER — DIPHENHYDRAMINE HYDROCHLORIDE 50 MG/ML
50 INJECTION, SOLUTION INTRAMUSCULAR; INTRAVENOUS AS NEEDED
Start: 2023-01-23

## 2023-01-16 RX ORDER — SODIUM CHLORIDE 9 MG/ML
10 INJECTION INTRAVENOUS AS NEEDED
OUTPATIENT
Start: 2023-01-23

## 2023-01-16 RX ORDER — ONDANSETRON 2 MG/ML
8 INJECTION INTRAMUSCULAR; INTRAVENOUS AS NEEDED
OUTPATIENT
Start: 2023-01-23

## 2023-01-16 RX ORDER — ACETAMINOPHEN 325 MG/1
650 TABLET ORAL AS NEEDED
Start: 2023-01-23

## 2023-01-16 RX ORDER — HYDROCORTISONE SODIUM SUCCINATE 100 MG/2ML
100 INJECTION, POWDER, FOR SOLUTION INTRAMUSCULAR; INTRAVENOUS AS NEEDED
OUTPATIENT
Start: 2023-01-23

## 2023-01-16 RX ORDER — EPINEPHRINE 1 MG/ML
0.3 INJECTION, SOLUTION, CONCENTRATE INTRAVENOUS AS NEEDED
OUTPATIENT
Start: 2023-01-23

## 2023-01-16 RX ORDER — DIPHENHYDRAMINE HYDROCHLORIDE 50 MG/ML
25 INJECTION, SOLUTION INTRAMUSCULAR; INTRAVENOUS AS NEEDED
Start: 2023-01-23

## 2023-01-16 RX ORDER — ALBUTEROL SULFATE 0.83 MG/ML
2.5 SOLUTION RESPIRATORY (INHALATION) AS NEEDED
Start: 2023-01-23

## 2023-01-19 NOTE — PROGRESS NOTES
Discharge instructions reviewed with patient daughter    Medication list and understanding of medications reviewed with patient daughter. OTC and herbal medications reviewed and added to med list if applicable  Barriers to adherence assessed. Guidance given regarding new medications this visit, including reason for taking this medicine, and common side effects. AVS given to patient daughter. Explained to patient daughter. Patient daughter expressed understanding. 99

## 2023-01-20 ENCOUNTER — HOSPITAL ENCOUNTER (OUTPATIENT)
Dept: INFUSION THERAPY | Age: 73
Discharge: HOME | End: 2023-01-20

## 2023-01-20 VITALS
BODY MASS INDEX: 33.14 KG/M2 | WEIGHT: 164.4 LBS | HEART RATE: 72 BPM | TEMPERATURE: 98.5 F | DIASTOLIC BLOOD PRESSURE: 90 MMHG | HEIGHT: 59 IN | RESPIRATION RATE: 16 BRPM | SYSTOLIC BLOOD PRESSURE: 160 MMHG

## 2023-01-20 DIAGNOSIS — C83.38 DIFFUSE LARGE B-CELL LYMPHOMA OF LYMPH NODES OF MULTIPLE REGIONS (HCC): ICD-10-CM

## 2023-01-20 DIAGNOSIS — R22.41 LUMP OF SKIN OF RIGHT LOWER EXTREMITY: Primary | ICD-10-CM

## 2023-01-20 LAB
ALBUMIN SERPL-MCNC: 3.5 G/DL (ref 3.4–5)
ALBUMIN/GLOB SERPL: 1.1 (ref 0.8–1.7)
ALP SERPL-CCNC: 77 U/L (ref 45–117)
ALT SERPL-CCNC: 49 U/L (ref 13–56)
ANION GAP SERPL CALC-SCNC: 4 MMOL/L (ref 3–18)
AST SERPL-CCNC: 24 U/L (ref 10–38)
BASO+EOS+MONOS # BLD AUTO: 0.8 K/UL (ref 0–2.3)
BASO+EOS+MONOS NFR BLD AUTO: 12 % (ref 0.1–17)
BILIRUB SERPL-MCNC: 0.4 MG/DL (ref 0.2–1)
BUN SERPL-MCNC: 16 MG/DL (ref 7–18)
BUN/CREAT SERPL: 25 (ref 12–20)
CALCIUM SERPL-MCNC: 9 MG/DL (ref 8.5–10.1)
CHLORIDE SERPL-SCNC: 106 MMOL/L (ref 100–111)
CO2 SERPL-SCNC: 28 MMOL/L (ref 21–32)
CREAT SERPL-MCNC: 0.65 MG/DL (ref 0.6–1.3)
DIFFERENTIAL METHOD BLD: ABNORMAL
ERYTHROCYTE [DISTWIDTH] IN BLOOD BY AUTOMATED COUNT: 14 % (ref 11.5–14.5)
GLOBULIN SER CALC-MCNC: 3.1 G/DL (ref 2–4)
GLUCOSE SERPL-MCNC: 112 MG/DL (ref 74–99)
HCT VFR BLD AUTO: 35.1 % (ref 36–48)
HGB BLD-MCNC: 11.5 G/DL (ref 12–16)
LYMPHOCYTES # BLD: 1.3 K/UL (ref 1.1–5.9)
LYMPHOCYTES NFR BLD: 20 % (ref 14–44)
MCH RBC QN AUTO: 26.4 PG (ref 25–35)
MCHC RBC AUTO-ENTMCNC: 32.8 G/DL (ref 31–37)
MCV RBC AUTO: 80.7 FL (ref 78–102)
NEUTS SEG # BLD: 4.3 K/UL (ref 1.8–9.5)
NEUTS SEG NFR BLD: 68 % (ref 40–70)
PLATELET # BLD AUTO: 231 K/UL (ref 140–440)
POTASSIUM SERPL-SCNC: 3.8 MMOL/L (ref 3.5–5.5)
PROT SERPL-MCNC: 6.6 G/DL (ref 6.4–8.2)
RBC # BLD AUTO: 4.35 M/UL (ref 4.1–5.1)
SODIUM SERPL-SCNC: 138 MMOL/L (ref 136–145)
WBC # BLD AUTO: 6.4 K/UL (ref 4.5–13)

## 2023-01-20 PROCEDURE — 74011000250 HC RX REV CODE- 250: Performed by: INTERNAL MEDICINE

## 2023-01-20 PROCEDURE — 85025 COMPLETE CBC W/AUTO DIFF WBC: CPT

## 2023-01-20 PROCEDURE — 74011250636 HC RX REV CODE- 250/636: Performed by: INTERNAL MEDICINE

## 2023-01-20 PROCEDURE — 80053 COMPREHEN METABOLIC PANEL: CPT

## 2023-01-20 PROCEDURE — 77030012965 HC NDL HUBR BBMI -A

## 2023-01-20 PROCEDURE — 36591 DRAW BLOOD OFF VENOUS DEVICE: CPT

## 2023-01-20 RX ORDER — SODIUM CHLORIDE 0.9 % (FLUSH) 0.9 %
5-10 SYRINGE (ML) INJECTION AS NEEDED
Status: DISCONTINUED | OUTPATIENT
Start: 2023-01-20 | End: 2023-01-22 | Stop reason: HOSPADM

## 2023-01-20 RX ORDER — HEPARIN 100 UNIT/ML
500 SYRINGE INTRAVENOUS ONCE
Status: COMPLETED | OUTPATIENT
Start: 2023-01-20 | End: 2023-01-20

## 2023-01-20 RX ADMIN — SODIUM CHLORIDE, PRESERVATIVE FREE 10 ML: 5 INJECTION INTRAVENOUS at 08:30

## 2023-01-20 RX ADMIN — Medication 500 UNITS: at 08:30

## 2023-01-20 NOTE — PROGRESS NOTES
[facility] Progress Note    Date: 2023    Name: Sabina Corrales              MRN: [id number]              : 1950    Pre-Chemo labs      Ms. Sarabjit Salgado was assessed and education was provided. Ms. Sarabjit Salgado arrived ambulatory using cane accompanied by her daughter. Translation services being used via ipad. Treatment education reviewed with Ms. Sarabjit Salgado and daughter. Ms. Hilda Mccoy vitals were reviewed. Visit Vitals  BP (!) 160/90 (BP 1 Location: Left arm, BP Patient Position: Sitting)   Pulse 72   Temp 98.5 °F (36.9 °C)   Resp 16   Ht 4' 11\" (1.499 m)   Wt 74.6 kg (164 lb 6.4 oz)   BMI 33.20 kg/m²       Lab results were obtained and reviewed. Recent Results (from the past 12 hour(s))   CBC WITH 3 PART DIFF    Collection Time: 23  8:30 AM   Result Value Ref Range    WBC 6.4 4.5 - 13.0 K/uL    RBC 4.35 4.10 - 5.10 M/uL    HGB 11.5 (L) 12.0 - 16.0 g/dL    HCT 35.1 (L) 36 - 48 %    MCV 80.7 78 - 102 FL    MCH 26.4 25.0 - 35.0 PG    MCHC 32.8 31 - 37 g/dL    RDW 14.0 11.5 - 14.5 %    PLATELET 770 651 - 927 K/uL    NEUTROPHILS 68 40 - 70 %    Mixed cells 12 0.1 - 17 %    LYMPHOCYTES 20 14 - 44 %    ABS. NEUTROPHILS 4.3 1.8 - 9.5 K/UL    ABS. MIXED CELLS 0.8 0.0 - 2.3 K/uL    ABS. LYMPHOCYTES 1.3 1.1 - 5.9 K/UL    DF AUTOMATED     METABOLIC PANEL, COMPREHENSIVE    Collection Time: 23  8:30 AM   Result Value Ref Range    Sodium 138 136 - 145 mmol/L    Potassium 3.8 3.5 - 5.5 mmol/L    Chloride 106 100 - 111 mmol/L    CO2 28 21 - 32 mmol/L    Anion gap 4 3.0 - 18 mmol/L    Glucose 112 (H) 74 - 99 mg/dL    BUN 16 7.0 - 18 MG/DL    Creatinine 0.65 0.6 - 1.3 MG/DL    BUN/Creatinine ratio 25 (H) 12 - 20      eGFR >60 >60 ml/min/1.73m2    Calcium 9.0 8.5 - 10.1 MG/DL    Bilirubin, total 0.4 0.2 - 1.0 MG/DL    ALT (SGPT) 49 13 - 56 U/L    AST (SGOT) 24 10 - 38 U/L    Alk.  phosphatase 77 45 - 117 U/L    Protein, total 6.6 6.4 - 8.2 g/dL    Albumin 3.5 3.4 - 5.0 g/dL    Globulin 3.1 2.0 - 4.0 g/dL    A-G Ratio 1.1 0.8 - 1.7         Right chest medi-port accessed and blood drawn per orders, per protocol without complications. Port flushed and de-accessed per orders, per protocol without complications. Band aid applied to site     Ms. Neha Anderson tolerated the blood draw, and had no complaints at this time. Armband removed and shredded    Ms. Neha Anderson was discharged from [place] [address] in stable condition at [address] [facility]. She is to return on 1/23/23 at 0800 for her next appointment.     Viky Henderson RN  January 20, 2023  9:42 AM

## 2023-01-23 ENCOUNTER — HOSPITAL ENCOUNTER (OUTPATIENT)
Dept: INFUSION THERAPY | Age: 73
Discharge: HOME | End: 2023-01-23

## 2023-01-23 VITALS
DIASTOLIC BLOOD PRESSURE: 78 MMHG | HEART RATE: 73 BPM | TEMPERATURE: 98.2 F | OXYGEN SATURATION: 97 % | RESPIRATION RATE: 16 BRPM | SYSTOLIC BLOOD PRESSURE: 141 MMHG

## 2023-01-23 DIAGNOSIS — R22.41 LUMP OF SKIN OF RIGHT LOWER EXTREMITY: ICD-10-CM

## 2023-01-23 DIAGNOSIS — C83.30 DIFFUSE LARGE B-CELL LYMPHOMA, UNSPECIFIED BODY REGION (HCC): Primary | ICD-10-CM

## 2023-01-23 PROCEDURE — 96415 CHEMO IV INFUSION ADDL HR: CPT

## 2023-01-23 PROCEDURE — 96367 TX/PROPH/DG ADDL SEQ IV INF: CPT

## 2023-01-23 PROCEDURE — 96413 CHEMO IV INFUSION 1 HR: CPT

## 2023-01-23 PROCEDURE — 74011000250 HC RX REV CODE- 250: Performed by: INTERNAL MEDICINE

## 2023-01-23 PROCEDURE — 74011250636 HC RX REV CODE- 250/636: Performed by: INTERNAL MEDICINE

## 2023-01-23 PROCEDURE — 74011000258 HC RX REV CODE- 258: Performed by: INTERNAL MEDICINE

## 2023-01-23 PROCEDURE — 74011250637 HC RX REV CODE- 250/637: Performed by: INTERNAL MEDICINE

## 2023-01-23 PROCEDURE — 77030012965 HC NDL HUBR BBMI -A

## 2023-01-23 PROCEDURE — 96375 TX/PRO/DX INJ NEW DRUG ADDON: CPT

## 2023-01-23 RX ORDER — SODIUM CHLORIDE 9 MG/ML
100 INJECTION, SOLUTION INTRAVENOUS CONTINUOUS
Status: DISPENSED | OUTPATIENT
Start: 2023-01-23 | End: 2023-01-23

## 2023-01-23 RX ORDER — DIPHENHYDRAMINE HYDROCHLORIDE 50 MG/ML
50 INJECTION, SOLUTION INTRAMUSCULAR; INTRAVENOUS ONCE
Status: COMPLETED | OUTPATIENT
Start: 2023-01-23 | End: 2023-01-23

## 2023-01-23 RX ORDER — ACETAMINOPHEN 325 MG/1
650 TABLET ORAL ONCE
Status: COMPLETED | OUTPATIENT
Start: 2023-01-23 | End: 2023-01-23

## 2023-01-23 RX ORDER — SODIUM CHLORIDE 0.9 % (FLUSH) 0.9 %
10 SYRINGE (ML) INJECTION AS NEEDED
Status: DISPENSED | OUTPATIENT
Start: 2023-01-23 | End: 2023-01-23

## 2023-01-23 RX ORDER — SODIUM CHLORIDE 9 MG/ML
25 INJECTION, SOLUTION INTRAVENOUS CONTINUOUS
Status: DISPENSED | OUTPATIENT
Start: 2023-01-23 | End: 2023-01-23

## 2023-01-23 RX ORDER — HEPARIN 100 UNIT/ML
300-500 SYRINGE INTRAVENOUS AS NEEDED
Status: DISPENSED | OUTPATIENT
Start: 2023-01-23 | End: 2023-01-23

## 2023-01-23 RX ADMIN — DIPHENHYDRAMINE HYDROCHLORIDE 50 MG: 50 INJECTION INTRAMUSCULAR; INTRAVENOUS at 08:44

## 2023-01-23 RX ADMIN — DEXAMETHASONE SODIUM PHOSPHATE 10 MG: 10 INJECTION, SOLUTION INTRAMUSCULAR; INTRAVENOUS at 08:51

## 2023-01-23 RX ADMIN — ACETAMINOPHEN 650 MG: 325 TABLET ORAL at 08:36

## 2023-01-23 RX ADMIN — RITUXIMAB 660 MG: 10 INJECTION, SOLUTION INTRAVENOUS at 10:00

## 2023-01-23 RX ADMIN — SODIUM CHLORIDE 100 ML/HR: 9 INJECTION, SOLUTION INTRAVENOUS at 08:43

## 2023-01-23 RX ADMIN — SODIUM CHLORIDE, PRESERVATIVE FREE 10 ML: 5 INJECTION INTRAVENOUS at 12:05

## 2023-01-23 RX ADMIN — SODIUM CHLORIDE 500 ML: 9 INJECTION, SOLUTION INTRAVENOUS at 09:51

## 2023-01-23 RX ADMIN — HEPARIN 500 UNITS: 100 SYRINGE at 12:12

## 2023-01-23 NOTE — PROGRESS NOTES
[facility] Progress Note    Date: 2023    Name: Marlon Toledo              MRN: [id number]              : 1950    Chemotherapy Cycle:C11D1  Rituxan       Ms. Huyen Almazan was assessed and education was provided. Ms. Huyen Almazan arrived via wheelchair accompanied by her daughter. Ms. Huyen Almazan speaks very little english per patient and daughter. Translation services being used via ipad. Treatment education reviewed with Ms. Huyen Almazan and daughter. She denies fever, runny nose any n/v/d. Pt is afebrile and in NAD. Ms. Horace Calvin vitals were reviewed. Visit Vitals  BP (!) 141/78 (BP 1 Location: Left upper arm, BP Patient Position: Sitting)   Pulse 73   Temp 98.2 °F (36.8 °C)   Resp 16   SpO2 97%       Lab results were obtained and reviewed dated 23. Mediport accessed with 20 gauge 0.75 inch needle without complications. Port flushes without resistance and positive brisk blood return noted. Treatment initiated per orders. NS  ml bolus initiated. NS infusing IV 100ml/hr. Pre-medications (Tylenol 650 mg po, Benadryl 50 mg IV) were administered as ordered. .     Rituxan 660 mg was initiated at 100 mg/hr (50 ml/hr) for 30 minutes, increased by 50ml every 30 minutes to max rate of  200 ml/hr per orders without complications. Pt tolerates well, is resting comfortably. Port flushed and de-accessed per orders, per protocol without complications. Band aid applied to site. Ms. Huyen Almazan tolerated infusions, and had no complaints at this time. Armband removed and shredded. Ms. Huyen Almazan was discharged from [place] [id number] in stable condition at 1215. She is to return on 2/10/23 at 0800 for her next appointment.     Kusum Vargas RN  2023  2:06 PM

## 2023-01-24 ENCOUNTER — APPOINTMENT (OUTPATIENT)
Dept: INFUSION THERAPY | Age: 73
End: 2023-01-24

## 2023-01-25 ENCOUNTER — APPOINTMENT (OUTPATIENT)
Dept: INFUSION THERAPY | Age: 73
End: 2023-01-25

## 2023-01-30 ENCOUNTER — HOSPITAL ENCOUNTER (OUTPATIENT)
Dept: GENERAL RADIOLOGY | Age: 73
Discharge: HOME | End: 2023-01-30

## 2023-01-30 ENCOUNTER — OFFICE VISIT (OUTPATIENT)
Dept: ONCOLOGY | Age: 73
End: 2023-01-30

## 2023-01-30 VITALS
BODY MASS INDEX: 33.67 KG/M2 | HEIGHT: 59 IN | WEIGHT: 167 LBS | RESPIRATION RATE: 20 BRPM | SYSTOLIC BLOOD PRESSURE: 124 MMHG | OXYGEN SATURATION: 100 % | DIASTOLIC BLOOD PRESSURE: 75 MMHG | HEART RATE: 91 BPM

## 2023-01-30 DIAGNOSIS — J32.9 OTHER SINUSITIS, UNSPECIFIED CHRONICITY: ICD-10-CM

## 2023-01-30 DIAGNOSIS — Z79.899 ON ANTINEOPLASTIC CHEMOTHERAPY: ICD-10-CM

## 2023-01-30 DIAGNOSIS — D50.8 IRON DEFICIENCY ANEMIA SECONDARY TO INADEQUATE DIETARY IRON INTAKE: ICD-10-CM

## 2023-01-30 DIAGNOSIS — T45.1X5A CINV (CHEMOTHERAPY-INDUCED NAUSEA AND VOMITING): ICD-10-CM

## 2023-01-30 DIAGNOSIS — C83.35 DIFFUSE LARGE B-CELL LYMPHOMA OF LYMPH NODES OF LOWER EXTREMITY (HCC): ICD-10-CM

## 2023-01-30 DIAGNOSIS — R11.2 CINV (CHEMOTHERAPY-INDUCED NAUSEA AND VOMITING): ICD-10-CM

## 2023-01-30 DIAGNOSIS — C83.35 DIFFUSE LARGE B-CELL LYMPHOMA OF LYMPH NODES OF LOWER EXTREMITY (HCC): Primary | ICD-10-CM

## 2023-01-30 DIAGNOSIS — R05.9 COUGH, UNSPECIFIED TYPE: ICD-10-CM

## 2023-01-30 PROCEDURE — 71046 X-RAY EXAM CHEST 2 VIEWS: CPT

## 2023-01-30 PROCEDURE — 1123F ACP DISCUSS/DSCN MKR DOCD: CPT | Performed by: INTERNAL MEDICINE

## 2023-01-30 PROCEDURE — 99214 OFFICE O/P EST MOD 30 MIN: CPT | Performed by: INTERNAL MEDICINE

## 2023-01-30 RX ORDER — AMOXICILLIN AND CLAVULANATE POTASSIUM 500; 125 MG/1; MG/1
1 TABLET, FILM COATED ORAL EVERY 12 HOURS
Qty: 14 TABLET | Refills: 0 | Status: SHIPPED | OUTPATIENT
Start: 2023-01-30 | End: 2023-02-06

## 2023-01-30 NOTE — PROGRESS NOTES
Hematology/Medical Oncology Progress Note    Name: Nany Edmond  Date: 2023  : 1950    Primary Care Provider: Hao Aguilar NP    Ms. Jose Alejandro Sewell is a 74y.o. year old female with Diffuse Large B-cell lymphoma. ,   left breast fibroadenoma as per biopsy,  progrssivley enlarging  abdominal hernia repaired     New PORT on right chest is  well positioned by my palpation     Right medial proximal leg adenopathy has resolved completely. 2022  PET/CT  IMPRESSION   1. Interval resection of large right groin FDG avid mass seen on prior PET/CT. No residual malignant FDG uptake in this region or elsewhere. 2. Low level right hilar FDG avidity, probably reactive. No other sites of  suspicious increased FDG uptake. November 3, 2022  ECHO: Left Ventricle: Normal left ventricular systolic function with a visually estimated EF of 60 - 65%. Current Therapy:   S.p R mini CHOP  had  6 cycles last October 10, 2022                                  Now on  single agent retixumab every 3 weeks 375 mg /msq for 18 cycles    Started 2022  C1  (C7 of the regimen)                                 Continue  Retuximab every 3 weeks. We will provide premedication dexamethasone 10 mg IV prior to each Retuximab. She gets pruritis with it. Subjective:   Patient was being followed by Dr. Meryle Diamond who left the practice. The patient presents to [facility] clinic today for follow up. She was accompanied by her daughter who helped her in translating. She [place] The patient was agreeable to continue planned therapy at [facility]. She reported on-off nausea with therapy. Today she states that after her last Rituxan treatment she felt tired, has dizziness and headaches. She states that this is the first time it happened and when it was given at a faster rate. Her daughter is requesting to go back to the the 5-hr infusion time. She is also complaining of cough this weekend.  The patient otherwise has no other complaints. Denied fever, chills, night sweat, unintentional weight loss, skin lumps or bumps, acute bleeding or bruising issues. Denied headache, acute vision change, dizziness, chest pain, worsen shortness of breath, palpitation, productive cough, vomiting, abdominal pain, altered bowel habits, dysuria, new bone pain or back pain, focal numbness or weakness. She does not have any other concerns or complaints to report at this time. The past medical, surgical and social history has been reviewed and remains unchanged.    Past Medical History:   Diagnosis Date    Arthritis     Diabetes ([facility] [place] [address]     Hypertension     Lymphoma ([facility] [place] [address]     Right leg     Past Surgical History:   Procedure Laterality Date    COLONOSCOPY N/A 2022    COLONOSCOPY performed by Lynn Harris MD at [facility] ENDOSCOPY    HX BACK SURGERY      HX  SECTION      HX CHOLECYSTECTOMY      2022    HX ENDOSCOPY      EGD 2022    HX HERNIA REPAIR      HX HYSTERECTOMY      IR BX BONE MARROW DIAGNOSTIC  2022    IR INSERT TUNL CVC W PORT OVER 5 YEARS  2022    IR REPAIR CVAD W PORT / PUMP  2022     Social History     Socioeconomic History    Marital status:      Spouse name: Not on file    Number of children: Not on file    Years of education: Not on file    Highest education level: Not on file   Occupational History    Not on file   Tobacco Use    Smoking status: Never    Smokeless tobacco: Never   Vaping Use    Vaping Use: Never used   Substance and Sexual Activity    Alcohol use: Never    Drug use: Never    Sexual activity: Not on file   Other Topics Concern    Not on file   Social History Narrative    Not on file     Social Determinants of Health     Financial Resource Strain: Not on file   Food Insecurity: Not on file   Transportation Needs: Not on file   Physical Activity: Not on file   Stress: Not on file   Social Connections: Not on file   Intimate Partner Violence: Not on file   Housing Stability: Not on file     Family History   Problem Relation Age of Onset    Stroke Mother     Cancer Sister     Diabetes Sister     Hypertension Sister     Dementia Brother      Current Outpatient Medications   Medication Sig Dispense Refill    methylPREDNISolone (MEDROL DOSEPACK) 4 mg tablet Follow instructions per dose pack 1 Dose Pack 0    traZODone (DESYREL) 50 mg tablet Take 1 Tablet by mouth nightly. 30 Tablet 1    omeprazole (PRILOSEC) 40 mg capsule Take 1 Capsule by mouth two (2) times a day for 360 days. Indications: gastroesophageal reflux disease, patient is on high dose prednisone for her lymphoma 180 Capsule 3    oxyCODONE-acetaminophen (Percocet) 5-325 mg per tablet Take 1 Tablet by mouth every four (4) hours as needed for Pain for up to 7 days. Max Daily Amount: 6 Tablets. Indications: pain, severe headache when on the high dose prednison 40 Tablet 0    glyBURIDE (DIABETA) 1.25 mg tablet Take 1 Tablet by mouth Daily (before breakfast). 30 Tablet 1    prochlorperazine (COMPAZINE) 10 mg tablet Take 0.5 Tablets by mouth every six (6) hours as needed for Nausea or Vomiting for up to 180 days. Indications: nausea and vomiting caused by cancer drugs 40 Tablet 5    predniSONE (DELTASONE) 10 mg tablet Take 70 mg by mouth daily for 30 days. Indications: diffuse large B-cell lymphoma (Patient not taking: Reported on 6/28/2022) 35 Tablet 5    therapeutic multivitamin (THERAGRAN) tablet Take 1 Tablet by mouth daily. verapamiL (CALAN) 80 mg tablet Take 1 Tablet by mouth two (2) times a day. 30 Tablet 2    metFORMIN (GLUCOPHAGE) 850 mg tablet Take 1 Tablet by mouth two (2) times a day. 30 Tablet 2    OTHER Take 5 mg by mouth nightly. glibenclamida - diabetes from [place]      OTHER Take 2 mg by mouth nightly. tolterodina - bladder - from [place]      indomethacin (INDOCIN) 25 mg capsule Take 25 mg by mouth two (2) times a day.        Facility-Administered Medications Ordered in Other Visits   Medication Dose Route Frequency Provider Last Rate Last Admin    0.9% sodium chloride infusion  20 mL/hr IntraVENous CONTINUOUS Jean-Paul Edwards MD 20 mL/hr at 04/25/22 0924 20 mL/hr at 04/25/22 0924    heparin (porcine) 100 unit/mL injection 500 Units  500 Units InterCATHeter PRN Billy Martinez MD   500 Units at 04/25/22 1024     Review of Systems   Constitutional:  Positive for malaise/fatigue. Negative for chills, diaphoresis, fever and weight loss. Respiratory:  Negative for cough, hemoptysis, shortness of breath and wheezing. Cardiovascular:  Negative for chest pain, palpitations and leg swelling. Gastrointestinal:  Positive for nausea. Negative for abdominal pain, diarrhea, heartburn and vomiting. Genitourinary:  Negative for dysuria, frequency, hematuria and urgency. Musculoskeletal:  Negative for joint pain and myalgias. Skin:  Negative for itching and rash. Neurological:  Negative for dizziness, seizures, weakness and headaches. Psychiatric/Behavioral:  Negative for depression. The patient does not have insomnia. Objective:   Visit Vitals  /75   Pulse 91   Resp 20   Ht 4' 11\" (1.499 m)   Wt 75.8 kg (167 lb)   SpO2 100%   BMI 33.73 kg/m²       ECOG Performance Status (grade): 1  0 - able to carry on all pre-disease activity w/out restriction  1 - restricted but able to carry out light work  2 - ambulatory and can self- care but unable to carry out work  3 - bed or chair >50% of waking hours  4 - completely disable, total care, confined to bed or chair    Physical Exam  Constitutional:       Appearance: Normal appearance. HENT:      Head: Normocephalic and atraumatic. Eyes:      Pupils: Pupils are equal, round, and reactive to light. Cardiovascular:      Rate and Rhythm: Normal rate and regular rhythm. Heart sounds: Normal heart sounds. Pulmonary:      Effort: Pulmonary effort is normal.      Breath sounds: Normal breath sounds.    Abdominal:      General: Bowel sounds are normal.      Palpations: Abdomen is soft. Tenderness: There is no abdominal tenderness. There is no guarding. Musculoskeletal:         General: Normal range of motion. Cervical back: Neck supple. Right lower leg: No edema. Left lower leg: No edema. Skin:     General: Skin is warm. Neurological:      General: No focal deficit present. Mental Status: She is alert and oriented to person, place, and time. Mental status is at baseline. Diagnostics:      No results found for this or any previous visit (from the past 96 hour(s)). Imaging:  Results for orders placed during the hospital encounter of 06/28/22    IR REPAIR CVAD W PORT / PUMP    Narrative  PROCEDURE:    CHEST PORT REVISION    ATTENDING: Cade Abdul M.D. COMPLICATIONS: None. MEDICATIONS: 1% lidocaine, subcutaneous. IV fentanyl and Versed were used for  conscious sedation. Monitoring performed by nursing staff for 30 minutes. INDICATION:  Right Mediport, flips. PROCEDURE:    Informed consent was obtained where the risks, benefits and alternatives to the  procedure were explained. All elements of maximal sterile barrier technique followed including: cap and  mask and sterile gown and sterile gloves and a large sterile sheet and hand  hygiene and 2% chlorhexidine for cutaneous antisepsis (or acceptable alternative  antiseptics, per current guidelines). Following subcutaneous infiltration of lidocaine, an incision was made via the  existing scar. Then, following blunt dissection the port was accessed and  repositioned appropriately. Then, the [employer] power port was sutured in  place with single nonabsorbable suture. The catheter was flushed in situ, with  saline solution. The port into appropriate position. The single short incision  was closed with absorbable sutures then bandaged. No pneumothorax.     The patient tolerated the procedure well and was transferred from the IR suite  in stable condition. Impression  Right chest port revision as above. Port is ready for immediate use. No results found for this or any previous visit. Results for orders placed during the hospital encounter of 03/16/22    CT ABD PELV W CONT    Narrative  CT ABDOMEN AND PELVIS WITH CONTRAST    COMPARISON: None. INDICATIONS: Lymphoma. TECHNIQUE:  Following the uneventful administration of oral and 100cc of Isovue  300 intravenous contrast , volumetric data acquisition was performed of the  abdomen and pelvis on a multislice scanner and reconstructed in axial coronal  and sagittal planes    CT ABDOMEN FINDINGS:    Lung Bases: No focal hepatic lesions are evident. No biliary dilation. The  gallbladder is surgically absent. .    Liver/Gallbladder/Biliary: Normal.    Spleen: Normal. Tiny accessory splenule noted    Adrenal Glands: Normal.    Kidneys: Normal.    Pancreas: Normal.    Stomach, Small Bowel,  and Colon: Diverticulosis without findings of  diverticulitis. . A nonobstructed segment of transverse colon extends into a  ventral hernia just to the right of the umbilicus    Lymph Nodes: Normal in size and number in the abdomen and within the pelvis. A  right groin mass, apparently luisito histologically, is present measuring 7 x 6 x  9 cm. The abdominal aorta is unremarkable. The IVC is unremarkable. Peritoneal Spaces: No free fluid or free air is present. Abdominal wall: Umbilical region hernia containing transverse colon segment    Bladder: Unremarkable. The uterus is surgically absent. The left ovary is surgically absent. The right  ovary contains a 3.5 x 4 cm cyst.    Osseous Structures Of Abdomen And Pelvis: Unremarkable for age.     Impression  Right groin mass  Simple appearing right ovarian cyst  Diverticulosis  Ventral hernia containing nonobstructed segment of transverse colon      All CT scans at this facility are performed using dose optimization technique as  appropriate to the performed exam, to include automated exposure control,  adjustment of the mA and/or kV according to patient's size (Including  appropriate matching for site-specific examinations), or use of iterative  reconstruction technique. Lab Results   Component Value Date/Time    WBC 6.4 01/20/2023 08:30 AM    HGB 11.5 (L) 01/20/2023 08:30 AM    HCT 35.1 (L) 01/20/2023 08:30 AM    PLATELET 308 43/99/6896 08:30 AM    MCV 80.7 01/20/2023 08:30 AM     Lab Results   Component Value Date/Time    Sodium 138 01/20/2023 08:30 AM    Potassium 3.8 01/20/2023 08:30 AM    Chloride 106 01/20/2023 08:30 AM    CO2 28 01/20/2023 08:30 AM    Anion gap 4 01/20/2023 08:30 AM    Glucose 112 (H) 01/20/2023 08:30 AM    BUN 16 01/20/2023 08:30 AM    Creatinine 0.65 01/20/2023 08:30 AM    BUN/Creatinine ratio 25 (H) 01/20/2023 08:30 AM    GFR est AA >60 09/16/2022 08:15 AM    GFR est non-AA >60 09/16/2022 08:15 AM    Calcium 9.0 01/20/2023 08:30 AM    Bilirubin, total 0.4 01/20/2023 08:30 AM    Alk. phosphatase 77 01/20/2023 08:30 AM    Protein, total 6.6 01/20/2023 08:30 AM    Albumin 3.5 01/20/2023 08:30 AM    Globulin 3.1 01/20/2023 08:30 AM    A-G Ratio 1.1 01/20/2023 08:30 AM    ALT (SGPT) 49 01/20/2023 08:30 AM    AST (SGOT) 24 01/20/2023 08:30 AM       Diagnosis:     1. Diffuse large B-cell lymphoma of lymph nodes of lower extremity (HCC)    2. On antineoplastic chemotherapy    3. CINV (chemotherapy-induced nausea and vomiting)    4. Other sinusitis, unspecified chronicity    5. Cough, unspecified type    6. Iron deficiency anemia secondary to inadequate dietary iron intake        Assessment and Plan:   Diffuse Large B Cell Lymphoma   On Chemotherapy  CINV  --Patient was being followed by Dr. Ame Hinson who left the practice. The patient presents to [facility] clinic today for follows up. She was accompanied by her daughter.  The patient was agreeable to continue planned therapy at [facility].  --Completed  R mini CHOP, six cycles  POST R mini CHOP continue single agent Rituximab 375mg/msq every 3 weeks for 18 cycles as patient has a luis miguel risk lymphoma  --Added dexamethasone 10 mg IV as premedication prior to each Retuximab due to pruritis. --ECHO good and PET/CT CR  --She will continue Rituximab as planned. She has tolerated therapy without high graded toxicities. CINV  --Zofran PRN  --Today she states that after her last Rituxan treatment she felt tired, has dizziness and headaches. She states that this is the first time it happened and when it was given at a faster rate. Her daughter is requesting to go back to the the 5-hr infusion time. --Will send new orders to reflect infusion time for 5 hours. Sinusitis  --Augmentin x 7 days    Cough  --Chest XRAY today    Anemia   --We will continue to monitor CBC, Iron Profile, Ferritin, B12/folate and replacement as indicated    Follow up in 1 week for results or sooner if indicated. Orders Placed This Encounter    XR CHEST PA LAT     Standing Status:   Future     Number of Occurrences:   1     Standing Expiration Date:   2/29/2024     Order Specific Question:   Reason for Exam     Answer:   cough    CBC WITH AUTOMATED DIFF     Standing Status:   Future     Standing Expiration Date:   5/09/1220    METABOLIC PANEL, COMPREHENSIVE     Standing Status:   Future     Standing Expiration Date:   1/31/2024    IRON PROFILE     Standing Status:   Future     Standing Expiration Date:   1/31/2024    FERRITIN     Standing Status:   Future     Standing Expiration Date:   1/31/2024    VITAMIN B12 & FOLATE     Standing Status:   Future     Standing Expiration Date:   1/31/2024    amoxicillin-clavulanate (AUGMENTIN) 500-125 mg per tablet     Sig: Take 1 Tablet by mouth every twelve (12) hours for 7 days. Indications: acute bacterial infection of the sinuses     Dispense:  14 Tablet     Refill:  0         Ms. Odell Singleton has a reminder for a \"due or due soon\" health maintenance.  I have asked that she contact her primary care provider for follow-up on this health maintenance. All of patient's questions answered to their apparent satisfaction. They verbally showed understanding and agreement with aforementioned plan. Eyad Peoples DNP  1/30/2023      Elaine Terry MD  01/30/23        CC:  Abad John NP

## 2023-01-30 NOTE — LETTER
1/30/2023    Patient: Henny Dhaliwal   YOB: 1950   Date of Visit: 1/30/2023     Zacarias Ramirez NP  [address] [place] [address]  [address] [place]    Dear Zacarias Ramirez NP,      Thank you for referring Ms. Henny Dhaliwal to [facility] for evaluation. My notes for this consultation are attached. If you have questions, please do not hesitate to call me. I look forward to following your patient along with you.       Sincerely,    Philip Mosley MD

## 2023-01-31 ENCOUNTER — TELEPHONE (OUTPATIENT)
Dept: FAMILY MEDICINE CLINIC | Facility: CLINIC | Age: 73
End: 2023-01-31

## 2023-01-31 NOTE — TELEPHONE ENCOUNTER
Received a call from patient's daughter advising that her mother has a cough, suspect that it is bronchitis and would like to be seen in clinic next Tuesday. Also reports acute abdominal pain, had imaging done yesterday by another provider. Encouraged to come into clinic tomorrow, appt scheduled for 1030 AM. Aware of s/s that require ER care.

## 2023-02-01 ENCOUNTER — OFFICE VISIT (OUTPATIENT)
Dept: FAMILY MEDICINE CLINIC | Facility: CLINIC | Age: 73
End: 2023-02-01

## 2023-02-01 VITALS
SYSTOLIC BLOOD PRESSURE: 134 MMHG | HEIGHT: 59 IN | RESPIRATION RATE: 16 BRPM | WEIGHT: 167 LBS | BODY MASS INDEX: 33.67 KG/M2 | OXYGEN SATURATION: 93 % | HEART RATE: 94 BPM | TEMPERATURE: 97.7 F | DIASTOLIC BLOOD PRESSURE: 79 MMHG

## 2023-02-01 DIAGNOSIS — R05.1 ACUTE COUGH: Primary | ICD-10-CM

## 2023-02-01 PROCEDURE — 99214 OFFICE O/P EST MOD 30 MIN: CPT | Performed by: CLINICAL NURSE SPECIALIST

## 2023-02-01 PROCEDURE — 1123F ACP DISCUSS/DSCN MKR DOCD: CPT | Performed by: CLINICAL NURSE SPECIALIST

## 2023-02-01 RX ORDER — METHYLPREDNISOLONE 4 MG/1
TABLET ORAL
Qty: 1 DOSE PACK | Refills: 0 | Status: SHIPPED | OUTPATIENT
Start: 2023-02-01

## 2023-02-01 NOTE — PROGRESS NOTES
Discharge instructions reviewed with patient via  daughter    Medication list and understanding of medications reviewed with patient via  daughter  . OTC and herbal medications reviewed and added to med list if applicable  Barriers to adherence assessed. Guidance given regarding new medications this visit, including reason for taking this medicine, and common side effects. AVS given to patient . Explained to patient via  daughter. Patient expressed understanding via  daughter .

## 2023-02-01 NOTE — PROGRESS NOTES
HPI  Fareed Aleman is a 74 y.o. female being seen today for   Chief Complaint   Patient presents with    Follow-up          .  she states that she had been doing well until about five days ago. Started to have a cough, congestion, a headache, and a sore throat. Also feels that her left eye has been running. Denies fever, chills, body aches, nausea, vomiting, or diarrhea. Feels that symptoms are improving, cough has noticeably lessened. Has not taken anything over the counter, but has been drinking hot tea here and there. Was seen by oncology two days ago, started on augmentin due to symptoms, but pharmacy did not fill the augmentin so she was unable to start it. Also had an unremarkable chest xray two days ago. Also reports bilateral knee pain that has been worse recently with the cold weather, interested in obtaining steroid injections. Past Medical History:   Diagnosis Date    Arthritis     Diabetes ([facility] [address]     Hypertension     Lymphoma ([facility] [address] 2022    Right leg         ROS  Patient states that she is feeling well. Denies complaints of chest pain, shortness of breath, swelling of legs, dizziness or weakness. she denies nausea, vomiting or diarrhea. Current Outpatient Medications   Medication Sig    methylPREDNISolone (MEDROL DOSEPACK) 4 mg tablet 1 pack    amoxicillin-clavulanate (AUGMENTIN) 500-125 mg per tablet Take 1 Tablet by mouth every twelve (12) hours for 7 days. Indications: acute bacterial infection of the sinuses    traZODone (DESYREL) 50 mg tablet Take 1 Tablet by mouth nightly. omeprazole (PRILOSEC) 40 mg capsule Take 1 Capsule by mouth two (2) times a day for 360 days. Indications: gastroesophageal reflux disease, patient is on high dose prednisone for her lymphoma    oxyCODONE-acetaminophen (Percocet) 5-325 mg per tablet Take 1 Tablet by mouth every four (4) hours as needed for Pain for up to 7 days. Max Daily Amount: 6 Tablets.  Indications: pain, severe headache when on the high dose prednison    glyBURIDE (DIABETA) 1.25 mg tablet Take 1 Tablet by mouth Daily (before breakfast). prochlorperazine (COMPAZINE) 10 mg tablet Take 0.5 Tablets by mouth every six (6) hours as needed for Nausea or Vomiting for up to 180 days. Indications: nausea and vomiting caused by cancer drugs    predniSONE (DELTASONE) 10 mg tablet Take 70 mg by mouth daily for 30 days. Indications: diffuse large B-cell lymphoma (Patient not taking: Reported on 6/28/2022)    therapeutic multivitamin [facility]) tablet Take 1 Tablet by mouth daily. verapamiL (CALAN) 80 mg tablet Take 1 Tablet by mouth two (2) times a day. metFORMIN (GLUCOPHAGE) 850 mg tablet Take 1 Tablet by mouth two (2) times a day. OTHER Take 5 mg by mouth nightly. glibenclamida - diabetes from [place]    OTHER Take 2 mg by mouth nightly. tolterodina - bladder - from [place]    indomethacin (INDOCIN) 25 mg capsule Take 25 mg by mouth two (2) times a day. No current facility-administered medications for this visit. Facility-Administered Medications Ordered in Other Visits   Medication Dose Route Frequency    0.9% sodium chloride infusion  20 mL/hr IntraVENous CONTINUOUS    heparin (porcine) 100 unit/mL injection 500 Units  500 Units InterCATHeter PRN       PE  Visit Vitals  /79 (BP 1 Location: Left upper arm, BP Patient Position: Sitting, BP Cuff Size: Adult)   Pulse 94   Temp 97.7 °F (36.5 °C) (Temporal)   Resp 16   Ht 4' 11\" (1.499 m)   Wt 167 lb (75.8 kg)   SpO2 93%   BMI 33.73 kg/m²        Alert and oriented with normal mood and affect. she is well developed and well nourished . Lungs are clear without wheezing. Heart rate is regular without murmurs or gallops. There is no lower extremity edema. Mild conjunctival injection to left eye, nasal mucosa edematous, oral mucosa moist without edema or erythema, no lymphadenopathy. Assessment and Plan:    Augmentin held due to documented PCN allergy, not a true allergy.    Start augmentin, also advised in OTC per symptom management. Start steroid pack, should assist with URI symptom management as well as knee pain. RTC next week for bilateral knee injections as well as fasting labs. Encouraged to call with questions or concerns. Will follow up per labs and advise on any additional POC. ICD-10-CM ICD-9-CM    1.  Acute cough  R05.1 786.2               Elio Russo NP

## 2023-02-03 DIAGNOSIS — R22.41 KNEE MASS, RIGHT: Primary | ICD-10-CM

## 2023-02-06 ENCOUNTER — OFFICE VISIT (OUTPATIENT)
Dept: FAMILY MEDICINE CLINIC | Facility: CLINIC | Age: 73
End: 2023-02-06

## 2023-02-06 ENCOUNTER — HOSPITAL ENCOUNTER (OUTPATIENT)
Dept: LAB | Age: 73
Discharge: HOME | End: 2023-02-06

## 2023-02-06 VITALS
RESPIRATION RATE: 14 BRPM | BODY MASS INDEX: 33.73 KG/M2 | TEMPERATURE: 97.4 F | HEART RATE: 62 BPM | SYSTOLIC BLOOD PRESSURE: 182 MMHG | OXYGEN SATURATION: 99 % | DIASTOLIC BLOOD PRESSURE: 92 MMHG | HEIGHT: 59 IN

## 2023-02-06 DIAGNOSIS — M17.0 PRIMARY OSTEOARTHRITIS OF BOTH KNEES: ICD-10-CM

## 2023-02-06 DIAGNOSIS — E11.65 CONTROLLED TYPE 2 DIABETES MELLITUS WITH HYPERGLYCEMIA, WITHOUT LONG-TERM CURRENT USE OF INSULIN (HCC): Primary | ICD-10-CM

## 2023-02-06 DIAGNOSIS — C85.95 LYMPHOMA OF RIGHT INGUINAL REGION (HCC): ICD-10-CM

## 2023-02-06 DIAGNOSIS — E11.65 CONTROLLED TYPE 2 DIABETES MELLITUS WITH HYPERGLYCEMIA, WITHOUT LONG-TERM CURRENT USE OF INSULIN (HCC): ICD-10-CM

## 2023-02-06 LAB
ALBUMIN SERPL-MCNC: 3.7 G/DL (ref 3.4–5)
ALBUMIN/GLOB SERPL: 1.1 (ref 0.8–1.7)
ALP SERPL-CCNC: 85 U/L (ref 45–117)
ALT SERPL-CCNC: 45 U/L (ref 13–56)
ANION GAP SERPL CALC-SCNC: 5 MMOL/L (ref 3–18)
AST SERPL-CCNC: 40 U/L (ref 10–38)
BASOPHILS # BLD: 0 K/UL (ref 0–0.1)
BASOPHILS NFR BLD: 0 % (ref 0–2)
BILIRUB SERPL-MCNC: 0.3 MG/DL (ref 0.2–1)
BUN SERPL-MCNC: 21 MG/DL (ref 7–18)
BUN/CREAT SERPL: 30 (ref 12–20)
CALCIUM SERPL-MCNC: 9.3 MG/DL (ref 8.5–10.1)
CHLORIDE SERPL-SCNC: 105 MMOL/L (ref 100–111)
CHOLEST SERPL-MCNC: 129 MG/DL
CO2 SERPL-SCNC: 28 MMOL/L (ref 21–32)
CREAT SERPL-MCNC: 0.71 MG/DL (ref 0.6–1.3)
DIFFERENTIAL METHOD BLD: ABNORMAL
EOSINOPHIL # BLD: 0 K/UL (ref 0–0.4)
EOSINOPHIL NFR BLD: 0 % (ref 0–5)
ERYTHROCYTE [DISTWIDTH] IN BLOOD BY AUTOMATED COUNT: 14.3 % (ref 11.6–14.5)
EST. AVERAGE GLUCOSE BLD GHB EST-MCNC: 174 MG/DL
FERRITIN SERPL-MCNC: 70 NG/ML (ref 8–388)
FOLATE SERPL-MCNC: 8.9 NG/ML (ref 3.1–17.5)
GLOBULIN SER CALC-MCNC: 3.3 G/DL (ref 2–4)
GLUCOSE SERPL-MCNC: 79 MG/DL (ref 74–99)
HBA1C MFR BLD: 7.7 % (ref 4.2–5.6)
HCT VFR BLD AUTO: 37.9 % (ref 35–45)
HDLC SERPL-MCNC: 58 MG/DL (ref 40–60)
HDLC SERPL: 2.2 (ref 0–5)
HGB BLD-MCNC: 12.3 G/DL (ref 12–16)
IMM GRANULOCYTES # BLD AUTO: 0.2 K/UL (ref 0–0.04)
IMM GRANULOCYTES NFR BLD AUTO: 2 % (ref 0–0.5)
IRON SATN MFR SERPL: 10 % (ref 20–50)
IRON SERPL-MCNC: 44 UG/DL (ref 50–175)
LDLC SERPL CALC-MCNC: 41 MG/DL (ref 0–100)
LIPID PROFILE,FLP: ABNORMAL
LYMPHOCYTES # BLD: 1.5 K/UL (ref 0.9–3.6)
LYMPHOCYTES NFR BLD: 15 % (ref 21–52)
MCH RBC QN AUTO: 26.6 PG (ref 24–34)
MCHC RBC AUTO-ENTMCNC: 32.5 G/DL (ref 31–37)
MCV RBC AUTO: 81.9 FL (ref 78–100)
MONOCYTES # BLD: 1 K/UL (ref 0.05–1.2)
MONOCYTES NFR BLD: 10 % (ref 3–10)
NEUTS SEG # BLD: 7.2 K/UL (ref 1.8–8)
NEUTS SEG NFR BLD: 72 % (ref 40–73)
NRBC # BLD: 0 K/UL (ref 0–0.01)
NRBC BLD-RTO: 0 PER 100 WBC
PLATELET # BLD AUTO: 270 K/UL (ref 135–420)
PMV BLD AUTO: 10.3 FL (ref 9.2–11.8)
POTASSIUM SERPL-SCNC: 3.8 MMOL/L (ref 3.5–5.5)
PROT SERPL-MCNC: 7 G/DL (ref 6.4–8.2)
RBC # BLD AUTO: 4.63 M/UL (ref 4.2–5.3)
SODIUM SERPL-SCNC: 138 MMOL/L (ref 136–145)
TIBC SERPL-MCNC: 423 UG/DL (ref 250–450)
TRIGL SERPL-MCNC: 150 MG/DL (ref ?–150)
VIT B12 SERPL-MCNC: 1601 PG/ML (ref 211–911)
VLDLC SERPL CALC-MCNC: 30 MG/DL
WBC # BLD AUTO: 9.9 K/UL (ref 4.6–13.2)

## 2023-02-06 PROCEDURE — 82607 VITAMIN B-12: CPT

## 2023-02-06 PROCEDURE — 82728 ASSAY OF FERRITIN: CPT

## 2023-02-06 PROCEDURE — 80053 COMPREHEN METABOLIC PANEL: CPT

## 2023-02-06 PROCEDURE — 83036 HEMOGLOBIN GLYCOSYLATED A1C: CPT

## 2023-02-06 PROCEDURE — 3051F HG A1C>EQUAL 7.0%<8.0%: CPT | Performed by: FAMILY MEDICINE

## 2023-02-06 PROCEDURE — 80061 LIPID PANEL: CPT

## 2023-02-06 PROCEDURE — 85025 COMPLETE CBC W/AUTO DIFF WBC: CPT

## 2023-02-06 PROCEDURE — 99213 OFFICE O/P EST LOW 20 MIN: CPT | Performed by: FAMILY MEDICINE

## 2023-02-06 PROCEDURE — 83540 ASSAY OF IRON: CPT

## 2023-02-06 PROCEDURE — 1123F ACP DISCUSS/DSCN MKR DOCD: CPT | Performed by: FAMILY MEDICINE

## 2023-02-06 NOTE — PROGRESS NOTES
Diabetic foot exam performed by Cong Poe LPN      Measurement  Response Nurse Comment Physician Comment   Monofilament  R - 6/6  L - 6/6     Pulse DP R - present  L - present     Pulse TP R - present  L - present     Structural deformity R - None  L - None     Skin Integrity / Deformity R - None  L - None        Reviewed by:         Patient presents for lab draw ordered by:    Ordering Provider:  Will Hooper  Ordering Department/Practice:  [address]  Phone:  [phone]  Date Ordered:  2/6/23    The following labs were drawn and sent to [address] by Cong Poe LPN:    CBC, CMP, XNP1Z, and iron profile  Ferritin Vitamin B12 / Folate   lipid panel    The following tubes were sent:    Gold  ( 2) and Lavender  ( 2)    Draw site right brachial.  Patient tolerated draw with no distress. Discharge instructions reviewed with patient via  (daughter)    Medication list and understanding of medications reviewed with patient  via  (daughter). OTC and herbal medications reviewed and added to med list if applicable  Barriers to adherence assessed. Guidance given regarding new medications this visit, including reason for taking this medicine, and common side effects. AVS given to patient. Explained to patient via  (daughter) . Patient expressed understanding via  (daughter) .

## 2023-02-06 NOTE — PROGRESS NOTES
MARIA ELENA Cooper is a 74 y.o. female being seen today for   Chief Complaint   Patient presents with    Procedure     Knee Injection   . she states that she is here for knee injections. Last ones were 10 months ago and they lasted a long time. She thinks the cold weather has been making them worse. Also wanting to get her labs done that were ordered by oncology. Past Medical History:   Diagnosis Date    Arthritis     Diabetes ([facility] [address]     Hypertension     Lymphoma ([facility] [address] 2022    Right leg         ROS  Patient states that she is feeling well. Denies complaints of chest pain, shortness of breath, swelling of legs, dizziness or weakness. she denies nausea, vomiting or diarrhea. Current Outpatient Medications   Medication Sig    methylPREDNISolone (MEDROL DOSEPACK) 4 mg tablet 1 pack    traZODone (DESYREL) 50 mg tablet Take 1 Tablet by mouth nightly. omeprazole (PRILOSEC) 40 mg capsule Take 1 Capsule by mouth two (2) times a day for 360 days. Indications: gastroesophageal reflux disease, patient is on high dose prednisone for her lymphoma    oxyCODONE-acetaminophen (Percocet) 5-325 mg per tablet Take 1 Tablet by mouth every four (4) hours as needed for Pain for up to 7 days. Max Daily Amount: 6 Tablets. Indications: pain, severe headache when on the high dose prednison    glyBURIDE (DIABETA) 1.25 mg tablet Take 1 Tablet by mouth Daily (before breakfast). prochlorperazine (COMPAZINE) 10 mg tablet Take 0.5 Tablets by mouth every six (6) hours as needed for Nausea or Vomiting for up to 180 days. Indications: nausea and vomiting caused by cancer drugs    predniSONE (DELTASONE) 10 mg tablet Take 70 mg by mouth daily for 30 days. Indications: diffuse large B-cell lymphoma (Patient not taking: Reported on 6/28/2022)    therapeutic multivitamin [facility]) tablet Take 1 Tablet by mouth daily. verapamiL (CALAN) 80 mg tablet Take 1 Tablet by mouth two (2) times a day.     metFORMIN (GLUCOPHAGE) 850 mg tablet Take 1 Tablet by mouth two (2) times a day. OTHER Take 5 mg by mouth nightly. glibenclamida - diabetes from Driver    OTHER Take 2 mg by mouth nightly. tolterodina - bladder - from Driver    indomethacin (INDOCIN) 25 mg capsule Take 25 mg by mouth two (2) times a day. No current facility-administered medications for this visit. Facility-Administered Medications Ordered in Other Visits   Medication Dose Route Frequency    0.9% sodium chloride infusion  20 mL/hr IntraVENous CONTINUOUS    heparin (porcine) 100 unit/mL injection 500 Units  500 Units InterCATHeter PRN       PE  Visit Vitals  BP (!) 182/92 (BP 1 Location: Left upper arm, BP Patient Position: Sitting, BP Cuff Size: Adult)   Pulse 62   Temp 97.4 °F (36.3 °C) (Temporal)   Resp 14   Ht 4' 11\" (1.499 m)   SpO2 99%   BMI 33.73 kg/m²        Alert and oriented with normal mood and affect. she is well developed and well nourished . Marie Quiver There is no lower extremity edema. Some bony enlargement of knees bilaterally but no effusion or warmth. Assessment and Plan:        ICD-10-CM ICD-9-CM    1. Controlled type 2 diabetes mellitus with hyperglycemia, without long-term current use of insulin (HCC)  E11.65 250.80 HEMOGLOBIN A1C WITH EAG     790.29 LIPID PANEL      2. Lymphoma of right inguinal region ([address]  C85.95 202.85 CBC WITH AUTOMATED DIFF      METABOLIC PANEL, COMPREHENSIVE      VITAMIN B12 & FOLATE      FERRITIN      IRON PROFILE      3. Primary osteoarthritis of both knees  M17.0 715.16         Labs done today    After PARQ and consent signed, landmarks identified and bilateral knees injected from inferior medial position with 40mg kenalog and 1cc of 1% lidocaine without epinephrine. Pt tolerated well. Aftercare reviewed.          Evon Zapata MD

## 2023-02-07 ENCOUNTER — TELEPHONE (OUTPATIENT)
Dept: FAMILY MEDICINE CLINIC | Facility: CLINIC | Age: 73
End: 2023-02-07

## 2023-02-07 DIAGNOSIS — T50.905A ADVERSE EFFECT OF DRUG, INITIAL ENCOUNTER: ICD-10-CM

## 2023-02-07 DIAGNOSIS — R05.1 ACUTE COUGH: Primary | ICD-10-CM

## 2023-02-07 NOTE — TELEPHONE ENCOUNTER
Call from patient's daughter stating that the patient's left knee is swollen and painful following joint injection yesterday. Denies any fever or chills, uncertain if the knee has been warm as she has been applying ice intermittently. Took paracetamol from [facility] last night before bed with some relief. States that she did start taking coricidin, but discontinued it as they thought that it was causing her diarrhea. Advise that knee may continue to be swollen for a day or two, instructed to continue to monitor for any s/s of infection, though unlikely. Educated on imodium as well as probiotics, reassured that diarrhea more likely caused by antibiotic use. Follow up phone call tomorrow, aware to call with questions or concerns in the interim.

## 2023-02-08 ENCOUNTER — TELEPHONE (OUTPATIENT)
Dept: FAMILY MEDICINE CLINIC | Facility: CLINIC | Age: 73
End: 2023-02-08

## 2023-02-08 DIAGNOSIS — T50.905D ADVERSE EFFECT OF DRUG, SUBSEQUENT ENCOUNTER: Primary | ICD-10-CM

## 2023-02-08 NOTE — TELEPHONE ENCOUNTER
Contacted via phone to follow up on left knee swelling and discomfort after steroid injection two days ago. Per patient swelling and discomfort has significantly diminished, will CTM and advise of any changes.

## 2023-02-10 ENCOUNTER — HOSPITAL ENCOUNTER (OUTPATIENT)
Dept: INFUSION THERAPY | Age: 73
Discharge: HOME | End: 2023-02-10

## 2023-02-10 VITALS
TEMPERATURE: 98.2 F | HEART RATE: 79 BPM | DIASTOLIC BLOOD PRESSURE: 81 MMHG | HEIGHT: 59 IN | SYSTOLIC BLOOD PRESSURE: 148 MMHG | RESPIRATION RATE: 16 BRPM | BODY MASS INDEX: 32.72 KG/M2 | OXYGEN SATURATION: 100 % | WEIGHT: 162.3 LBS

## 2023-02-10 LAB
ALBUMIN SERPL-MCNC: 3 G/DL (ref 3.4–5)
ALBUMIN/GLOB SERPL: 1 (ref 0.8–1.7)
ALP SERPL-CCNC: 103 U/L (ref 45–117)
ALT SERPL-CCNC: 69 U/L (ref 13–56)
ANION GAP SERPL CALC-SCNC: 6 MMOL/L (ref 3–18)
AST SERPL-CCNC: 37 U/L (ref 10–38)
BASO+EOS+MONOS # BLD AUTO: 0.9 K/UL (ref 0–2.3)
BASO+EOS+MONOS NFR BLD AUTO: 14 % (ref 0.1–17)
BILIRUB SERPL-MCNC: 0.3 MG/DL (ref 0.2–1)
BUN SERPL-MCNC: 21 MG/DL (ref 7–18)
BUN/CREAT SERPL: 30 (ref 12–20)
CALCIUM SERPL-MCNC: 8.5 MG/DL (ref 8.5–10.1)
CHLORIDE SERPL-SCNC: 104 MMOL/L (ref 100–111)
CO2 SERPL-SCNC: 27 MMOL/L (ref 21–32)
CREAT SERPL-MCNC: 0.7 MG/DL (ref 0.6–1.3)
DIFFERENTIAL METHOD BLD: ABNORMAL
ERYTHROCYTE [DISTWIDTH] IN BLOOD BY AUTOMATED COUNT: 14.5 % (ref 11.5–14.5)
GLOBULIN SER CALC-MCNC: 2.9 G/DL (ref 2–4)
GLUCOSE SERPL-MCNC: 167 MG/DL (ref 74–99)
HCT VFR BLD AUTO: 37.5 % (ref 36–48)
HGB BLD-MCNC: 12 G/DL (ref 12–16)
LYMPHOCYTES # BLD: 0.9 K/UL (ref 1.1–5.9)
LYMPHOCYTES NFR BLD: 15 % (ref 14–44)
MCH RBC QN AUTO: 25.5 PG (ref 25–35)
MCHC RBC AUTO-ENTMCNC: 32 G/DL (ref 31–37)
MCV RBC AUTO: 79.8 FL (ref 78–102)
NEUTS SEG # BLD: 4.7 K/UL (ref 1.8–9.5)
NEUTS SEG NFR BLD: 71 % (ref 40–70)
PLATELET # BLD AUTO: 251 K/UL (ref 140–440)
POTASSIUM SERPL-SCNC: 3.8 MMOL/L (ref 3.5–5.5)
PROT SERPL-MCNC: 5.9 G/DL (ref 6.4–8.2)
RBC # BLD AUTO: 4.7 M/UL (ref 4.1–5.1)
SODIUM SERPL-SCNC: 137 MMOL/L (ref 136–145)
WBC # BLD AUTO: 6.5 K/UL (ref 4.5–13)

## 2023-02-10 PROCEDURE — 85025 COMPLETE CBC W/AUTO DIFF WBC: CPT

## 2023-02-10 PROCEDURE — 80053 COMPREHEN METABOLIC PANEL: CPT

## 2023-02-10 PROCEDURE — 77030012965 HC NDL HUBR BBMI -A

## 2023-02-10 PROCEDURE — 36591 DRAW BLOOD OFF VENOUS DEVICE: CPT

## 2023-02-10 RX ORDER — HEPARIN 100 UNIT/ML
500 SYRINGE INTRAVENOUS ONCE
Status: DISPENSED | OUTPATIENT
Start: 2023-02-10 | End: 2023-02-10

## 2023-02-10 RX ORDER — SODIUM CHLORIDE 0.9 % (FLUSH) 0.9 %
5-10 SYRINGE (ML) INJECTION AS NEEDED
Status: DISCONTINUED | OUTPATIENT
Start: 2023-02-10 | End: 2023-02-12 | Stop reason: HOSPADM

## 2023-02-10 NOTE — PROGRESS NOTES
[facility] Progress Note    Date: February 10, 2023    Name: Prudencio Oliva              MRN: [id number]              : 1950    Pre-Chemo Labs      Ms. Leeanne Ding was assessed and education was provided. Ms. Leeanne Ding arrived ambulatory using cane accompanied by her daughter. Translation services being used via ipad. Treatment education reviewed with Ms. Leeanne Ding and daughter. Ms. Braxton Harvey vitals were reviewed. Visit Vitals  BP (!) 148/81 (BP 1 Location: Left arm, BP Patient Position: Sitting)   Pulse 79   Temp 98.2 °F (36.8 °C)   Resp 16   Ht 4' 11\" (1.499 m)   Wt 73.6 kg (162 lb 4.8 oz)   SpO2 100%   Breastfeeding No   BMI 32.78 kg/m²       Lab results were obtained and reviewed. Recent Results (from the past 12 hour(s))   CBC WITH 3 PART DIFF    Collection Time: 02/10/23  8:50 AM   Result Value Ref Range    WBC 6.5 4.5 - 13.0 K/uL    RBC 4.70 4. 10 - 5.10 M/uL    HGB 12.0 12.0 - 16.0 g/dL    HCT 37.5 36 - 48 %    MCV 79.8 78 - 102 FL    MCH 25.5 25.0 - 35.0 PG    MCHC 32.0 31 - 37 g/dL    RDW 14.5 11.5 - 14.5 %    PLATELET 142 098 - 629 K/uL    NEUTROPHILS 71 (H) 40 - 70 %    Mixed cells 14 0.1 - 17 %    LYMPHOCYTES 15 14 - 44 %    ABS. NEUTROPHILS 4.7 1.8 - 9.5 K/UL    ABS. MIXED CELLS 0.9 0.0 - 2.3 K/uL    ABS. LYMPHOCYTES 0.9 (L) 1.1 - 5.9 K/UL    DF AUTOMATED     METABOLIC PANEL, COMPREHENSIVE    Collection Time: 02/10/23  8:50 AM   Result Value Ref Range    Sodium 137 136 - 145 mmol/L    Potassium 3.8 3.5 - 5.5 mmol/L    Chloride 104 100 - 111 mmol/L    CO2 27 21 - 32 mmol/L    Anion gap 6 3.0 - 18 mmol/L    Glucose 167 (H) 74 - 99 mg/dL    BUN 21 (H) 7.0 - 18 MG/DL    Creatinine 0.70 0.6 - 1.3 MG/DL    BUN/Creatinine ratio 30 (H) 12 - 20      eGFR >60 >60 ml/min/1.73m2    Calcium 8.5 8.5 - 10.1 MG/DL    Bilirubin, total 0.3 0.2 - 1.0 MG/DL    ALT (SGPT) 69 (H) 13 - 56 U/L    AST (SGOT) 37 10 - 38 U/L    Alk.  phosphatase 103 45 - 117 U/L    Protein, total 5.9 (L) 6.4 - 8.2 g/dL    Albumin 3.0 (L) 3.4 - 5.0 g/dL    Globulin 2.9 2.0 - 4.0 g/dL    A-G Ratio 1.0 0.8 - 1.7         Right chest medi-port accessed and blood drawn per orders, per protocol without complications. Port flushed and de-accessed per orders, per protocol without complications. Band aid applied to site     Ms. Adelaida Perez tolerated the blood draw, and had no complaints at this time. Armband removed and shredded    Ms. Adelaida Perez was discharged from [place] [address] in stable condition at 0900. She is to return on 2/13/23 at 0800 for her next appointment.     Lanette Hernandez RN  February 10, 2023  12:00 PM

## 2023-02-12 DIAGNOSIS — C83.30 DIFFUSE LARGE B-CELL LYMPHOMA, UNSPECIFIED BODY REGION (HCC): Primary | ICD-10-CM

## 2023-02-12 RX ORDER — ACETAMINOPHEN 325 MG/1
650 TABLET ORAL ONCE
OUTPATIENT
Start: 2023-02-13 | End: 2023-02-13

## 2023-02-12 RX ORDER — ONDANSETRON 2 MG/ML
8 INJECTION INTRAMUSCULAR; INTRAVENOUS
OUTPATIENT
Start: 2023-02-13

## 2023-02-12 RX ORDER — SODIUM CHLORIDE 9 MG/ML
5-40 INJECTION INTRAVENOUS PRN
OUTPATIENT
Start: 2023-02-13

## 2023-02-12 RX ORDER — HEPARIN SODIUM (PORCINE) LOCK FLUSH IV SOLN 100 UNIT/ML 100 UNIT/ML
500 SOLUTION INTRAVENOUS PRN
OUTPATIENT
Start: 2023-02-13

## 2023-02-12 RX ORDER — EPINEPHRINE 1 MG/ML
0.3 INJECTION, SOLUTION, CONCENTRATE INTRAVENOUS PRN
OUTPATIENT
Start: 2023-02-13

## 2023-02-12 RX ORDER — ACETAMINOPHEN 325 MG/1
650 TABLET ORAL
OUTPATIENT
Start: 2023-02-13

## 2023-02-12 RX ORDER — SODIUM CHLORIDE 9 MG/ML
5-250 INJECTION, SOLUTION INTRAVENOUS PRN
OUTPATIENT
Start: 2023-02-13

## 2023-02-12 RX ORDER — SODIUM CHLORIDE 9 MG/ML
INJECTION, SOLUTION INTRAVENOUS CONTINUOUS
OUTPATIENT
Start: 2023-02-13

## 2023-02-12 RX ORDER — DIPHENHYDRAMINE HYDROCHLORIDE 50 MG/ML
50 INJECTION INTRAMUSCULAR; INTRAVENOUS ONCE
OUTPATIENT
Start: 2023-02-13 | End: 2023-02-13

## 2023-02-12 RX ORDER — DIPHENHYDRAMINE HYDROCHLORIDE 50 MG/ML
50 INJECTION INTRAMUSCULAR; INTRAVENOUS
OUTPATIENT
Start: 2023-02-13

## 2023-02-12 RX ORDER — FAMOTIDINE 10 MG/ML
20 INJECTION, SOLUTION INTRAVENOUS
OUTPATIENT
Start: 2023-02-13

## 2023-02-12 RX ORDER — MEPERIDINE HYDROCHLORIDE 50 MG/ML
12.5 INJECTION INTRAMUSCULAR; INTRAVENOUS; SUBCUTANEOUS PRN
OUTPATIENT
Start: 2023-02-13

## 2023-02-12 RX ORDER — ALBUTEROL SULFATE 90 UG/1
4 AEROSOL, METERED RESPIRATORY (INHALATION) PRN
OUTPATIENT
Start: 2023-02-13

## 2023-02-13 ENCOUNTER — HOSPITAL ENCOUNTER (OUTPATIENT)
Facility: HOSPITAL | Age: 73
Setting detail: INFUSION SERIES
End: 2023-02-13

## 2023-02-13 ENCOUNTER — HOSPITAL ENCOUNTER (OUTPATIENT)
Dept: INFUSION THERAPY | Age: 73
End: 2023-02-13

## 2023-02-13 VITALS
DIASTOLIC BLOOD PRESSURE: 73 MMHG | HEART RATE: 84 BPM | TEMPERATURE: 97.7 F | SYSTOLIC BLOOD PRESSURE: 152 MMHG | RESPIRATION RATE: 16 BRPM

## 2023-02-13 DIAGNOSIS — C83.30 DIFFUSE LARGE B-CELL LYMPHOMA, UNSPECIFIED BODY REGION (HCC): Primary | ICD-10-CM

## 2023-02-13 PROCEDURE — 2580000003 HC RX 258: Performed by: INTERNAL MEDICINE

## 2023-02-13 PROCEDURE — 96413 CHEMO IV INFUSION 1 HR: CPT

## 2023-02-13 PROCEDURE — 6360000002 HC RX W HCPCS: Performed by: INTERNAL MEDICINE

## 2023-02-13 PROCEDURE — 96366 THER/PROPH/DIAG IV INF ADDON: CPT

## 2023-02-13 PROCEDURE — 96417 CHEMO IV INFUS EACH ADDL SEQ: CPT

## 2023-02-13 PROCEDURE — 96375 TX/PRO/DX INJ NEW DRUG ADDON: CPT

## 2023-02-13 PROCEDURE — 6370000000 HC RX 637 (ALT 250 FOR IP): Performed by: INTERNAL MEDICINE

## 2023-02-13 PROCEDURE — 96415 CHEMO IV INFUSION ADDL HR: CPT

## 2023-02-13 RX ORDER — DIPHENHYDRAMINE HYDROCHLORIDE 50 MG/ML
50 INJECTION INTRAMUSCULAR; INTRAVENOUS ONCE
Status: COMPLETED | OUTPATIENT
Start: 2023-02-13 | End: 2023-02-13

## 2023-02-13 RX ORDER — SODIUM CHLORIDE 9 MG/ML
5-40 INJECTION INTRAVENOUS PRN
Status: DISCONTINUED | OUTPATIENT
Start: 2023-02-13 | End: 2023-02-14 | Stop reason: HOSPADM

## 2023-02-13 RX ORDER — ACETAMINOPHEN 325 MG/1
650 TABLET ORAL ONCE
Status: COMPLETED | OUTPATIENT
Start: 2023-02-13 | End: 2023-02-13

## 2023-02-13 RX ORDER — HEPARIN SODIUM (PORCINE) LOCK FLUSH IV SOLN 100 UNIT/ML 100 UNIT/ML
500 SOLUTION INTRAVENOUS PRN
Status: DISCONTINUED | OUTPATIENT
Start: 2023-02-13 | End: 2023-02-14 | Stop reason: HOSPADM

## 2023-02-13 RX ORDER — SODIUM CHLORIDE 9 MG/ML
5-250 INJECTION, SOLUTION INTRAVENOUS PRN
Status: DISCONTINUED | OUTPATIENT
Start: 2023-02-13 | End: 2023-02-14 | Stop reason: HOSPADM

## 2023-02-13 RX ADMIN — ACETAMINOPHEN 650 MG: 325 TABLET ORAL at 09:44

## 2023-02-13 RX ADMIN — SODIUM CHLORIDE 250 ML/HR: 0.9 INJECTION, SOLUTION INTRAVENOUS at 10:00

## 2023-02-13 RX ADMIN — DIPHENHYDRAMINE HYDROCHLORIDE 50 MG: 50 INJECTION INTRAMUSCULAR; INTRAVENOUS at 09:48

## 2023-02-13 RX ADMIN — RITUXIMAB 660 MG: 10 INJECTION, SOLUTION INTRAVENOUS at 11:12

## 2023-02-13 RX ADMIN — DEXAMETHASONE SODIUM PHOSPHATE 10 MG: 10 INJECTION, SOLUTION INTRAMUSCULAR; INTRAVENOUS at 09:50

## 2023-02-14 ENCOUNTER — APPOINTMENT (OUTPATIENT)
Dept: INFUSION THERAPY | Age: 73
End: 2023-02-14

## 2023-02-15 ENCOUNTER — APPOINTMENT (OUTPATIENT)
Dept: INFUSION THERAPY | Age: 73
End: 2023-02-15

## 2023-02-16 ENCOUNTER — OFFICE VISIT (OUTPATIENT)
Age: 73
End: 2023-02-16

## 2023-02-16 VITALS
OXYGEN SATURATION: 100 % | HEIGHT: 59 IN | RESPIRATION RATE: 18 BRPM | HEART RATE: 79 BPM | SYSTOLIC BLOOD PRESSURE: 123 MMHG | BODY MASS INDEX: 33.47 KG/M2 | WEIGHT: 166 LBS | DIASTOLIC BLOOD PRESSURE: 77 MMHG

## 2023-02-16 DIAGNOSIS — R11.2 CHEMOTHERAPY INDUCED NAUSEA AND VOMITING: ICD-10-CM

## 2023-02-16 DIAGNOSIS — T45.1X5A ANTINEOPLASTIC CHEMOTHERAPY INDUCED ANEMIA: ICD-10-CM

## 2023-02-16 DIAGNOSIS — D64.81 ANTINEOPLASTIC CHEMOTHERAPY INDUCED ANEMIA: ICD-10-CM

## 2023-02-16 DIAGNOSIS — C83.35 DIFFUSE LARGE B-CELL LYMPHOMA, LYMPH NODES OF INGUINAL REGION AND LOWER LIMB (HCC): Primary | ICD-10-CM

## 2023-02-16 DIAGNOSIS — T45.1X5A CHEMOTHERAPY INDUCED NAUSEA AND VOMITING: ICD-10-CM

## 2023-02-16 DIAGNOSIS — Z79.899 ON ANTINEOPLASTIC CHEMOTHERAPY: ICD-10-CM

## 2023-02-16 DIAGNOSIS — J32.9 SINUSITIS, UNSPECIFIED CHRONICITY, UNSPECIFIED LOCATION: ICD-10-CM

## 2023-02-16 PROBLEM — D50.9 IRON DEFICIENCY ANEMIA: Status: ACTIVE | Noted: 2023-02-16

## 2023-02-16 RX ORDER — DIPHENHYDRAMINE HYDROCHLORIDE 50 MG/ML
50 INJECTION INTRAMUSCULAR; INTRAVENOUS
OUTPATIENT
Start: 2023-02-20

## 2023-02-16 RX ORDER — FAMOTIDINE 10 MG/ML
20 INJECTION, SOLUTION INTRAVENOUS
OUTPATIENT
Start: 2023-02-20

## 2023-02-16 RX ORDER — SODIUM CHLORIDE 9 MG/ML
INJECTION, SOLUTION INTRAVENOUS CONTINUOUS
OUTPATIENT
Start: 2023-02-20

## 2023-02-16 RX ORDER — ACETAMINOPHEN 325 MG/1
650 TABLET ORAL
OUTPATIENT
Start: 2023-02-20

## 2023-02-16 RX ORDER — HEPARIN SODIUM (PORCINE) LOCK FLUSH IV SOLN 100 UNIT/ML 100 UNIT/ML
500 SOLUTION INTRAVENOUS PRN
OUTPATIENT
Start: 2023-02-20

## 2023-02-16 RX ORDER — ONDANSETRON 2 MG/ML
8 INJECTION INTRAMUSCULAR; INTRAVENOUS
OUTPATIENT
Start: 2023-02-20

## 2023-02-16 RX ORDER — SODIUM CHLORIDE 0.9 % (FLUSH) 0.9 %
5-40 SYRINGE (ML) INJECTION PRN
OUTPATIENT
Start: 2023-02-20

## 2023-02-16 RX ORDER — SODIUM CHLORIDE 9 MG/ML
5-250 INJECTION, SOLUTION INTRAVENOUS PRN
OUTPATIENT
Start: 2023-02-20

## 2023-02-16 RX ORDER — EPINEPHRINE 1 MG/ML
0.3 INJECTION, SOLUTION, CONCENTRATE INTRAVENOUS PRN
OUTPATIENT
Start: 2023-02-20

## 2023-02-16 RX ORDER — ALBUTEROL SULFATE 90 UG/1
4 AEROSOL, METERED RESPIRATORY (INHALATION) PRN
OUTPATIENT
Start: 2023-02-20

## 2023-02-16 RX ORDER — AMOXICILLIN AND CLAVULANATE POTASSIUM 500; 125 MG/1; MG/1
TABLET, FILM COATED ORAL
COMMUNITY
Start: 2023-02-01

## 2023-02-16 ASSESSMENT — ENCOUNTER SYMPTOMS
CHEST TIGHTNESS: 0
SORE THROAT: 0
NAUSEA: 0
SHORTNESS OF BREATH: 0
CONSTIPATION: 0
ABDOMINAL PAIN: 0
ANAL BLEEDING: 0
DIARRHEA: 0
BLOOD IN STOOL: 0

## 2023-02-16 NOTE — PROGRESS NOTES
HEMATOLOGY/MEDICAL ONCOLOGY PROGRESS NOTE    NAME: Sandro Nava  : 1950  DATE: 23    PCP: Blase Goldberg, APRN - CNP    SUBJECTIVE  Ms Sandro Nava is a 74 y. o.female who was seen for management of Diffuse Large B-cell lymphoma on Chemotherapy. Today she is accompanied by her daughter who translates for her/. Patient speaks British. She reports to me today that she tolerated her last treatment well. She states she tolerates it when the infusion is set at a lower rate. She states she has been doing well since her last office visit. She denies any fevers, chills, recurrent infections, and skin rash. She denies any shortness of breath, dizziness, chest pain, fatigue, weakness, and palpitations. She denies any abdominal pain, nausea, vomiting, diarrhea, or constipation. She denies bleeding or bruising. She denies pain or any discomfort. She does not have any concerns or complaints to report at this time. Past Medical History, Surgical History, Family History, and Social History reviewed and remain unchanged.      Past Medical History:   Diagnosis Date    Arthritis     Diabetes ([facility] [address]     Hypertension     Lymphoma ([facility] [address]     Right leg     Past Surgical History:   Procedure Laterality Date    BACK SURGERY       SECTION      CHOLECYSTECTOMY      2022    COLONOSCOPY N/A 2022    COLONOSCOPY performed by Leonila Ivory MD at [address] (CERVIX STATUS UNKNOWN)      IR BIOPSY PERC SUPERF BONE  2022    IR BIOPSY PERC SUPERF BONE 2022 [address] [facility]    IR BIOPSY PERC SUPERF BONE  2022    IR PORT PLACEMENT EQUAL OR GREATER THAN 5 YEARS  2022    IR PORT PLACEMENT EQUAL OR GREATER THAN 5 YEARS 2022 [address] [facility]    IR PORT PLACEMENT EQUAL OR GREATER THAN 5 YEARS  2022    IR REPAIR-CENTRAL CATH W/PORT  2022    UPPER GASTROINTESTINAL ENDOSCOPY      EGD 2022     BREAST BIOPSY W LOC DEVICE 1ST LESION LEFT Left 5/23/2022    US BREAST NEEDLE BIOPSY LEFT 5/23/2022 [facility] US     Social History     Socioeconomic History    Marital status:      Spouse name: None    Number of children: None    Years of education: None    Highest education level: None   Tobacco Use    Smoking status: Never    Smokeless tobacco: Never   Substance and Sexual Activity    Alcohol use: Never    Drug use: Never      Family History   Problem Relation Age of Onset    Stroke Mother     Cancer Sister     Diabetes Sister     Hypertension Sister     Dementia Brother        Social Determinants of Health     Tobacco Use: Low Risk     Smoking Tobacco Use: Never    Smokeless Tobacco Use: Never    Passive Exposure: Not on file   Alcohol Use: Not on file   Financial Resource Strain: Not on file   Food Insecurity: Not on file   Transportation Needs: Not on file   Physical Activity: Not on file   Stress: Not on file   Social Connections: Not on file   Intimate Partner Violence: Not on file   Depression: Not at risk    PHQ-2 Score: 0   Housing Stability: Not on file        Current Outpatient Medications   Medication Sig Dispense Refill    amoxicillin-clavulanate (AUGMENTIN) 500-125 MG per tablet TAKE 1 TABLET BY MOUTH EVERY 12 HOURS FOR 7 DAYS      glyBURIDE (DIABETA) 1.25 MG tablet Take 1.25 mg by mouth every morning (before breakfast)      indomethacin (INDOCIN) 25 MG capsule Take 25 mg by mouth 2 times daily      metFORMIN (GLUCOPHAGE) 850 MG tablet Take 850 mg by mouth 2 times daily      methylPREDNISolone (MEDROL DOSEPACK) 4 MG tablet Follow instructions per dose pack (Patient not taking: Reported on 2/16/2023)      omeprazole (PRILOSEC) 40 MG delayed release capsule Take 40 mg by mouth 2 times daily      oxyCODONE-acetaminophen (PERCOCET) 5-325 MG per tablet Take 1 tablet by mouth every 4 hours as needed.       predniSONE (DELTASONE) 10 MG tablet Take 70 mg by mouth daily      prochlorperazine (COMPAZINE) 10 MG tablet Take 5 mg by mouth every 6 hours as needed      traZODone (DESYREL) 50 MG tablet Take 50 mg by mouth      verapamil (CALAN) 80 MG tablet Take 80 mg by mouth 2 times daily       No current facility-administered medications for this visit. Facility-Administered Medications Ordered in Other Visits   Medication Dose Route Frequency Provider Last Rate Last Admin    riTUXimab (RITUXAN) 660 mg in sodium chloride 0.9 % 330 mL chemo IVPB  660 mg IntraVENous Continuous Biju Trujillo MD   Stopped at 02/13/23 1510     Review of Systems   Constitutional:  Negative for activity change, appetite change and fever. HENT:  Negative for congestion and sore throat. Respiratory:  Negative for chest tightness and shortness of breath. Cardiovascular:  Negative for chest pain, palpitations and leg swelling. Gastrointestinal:  Negative for abdominal pain, anal bleeding, blood in stool, constipation, diarrhea and nausea. Genitourinary:  Negative for difficulty urinating, frequency and urgency. Musculoskeletal:  Negative for arthralgias and myalgias. Neurological:  Negative for dizziness, weakness and light-headedness. OBJECTIVE    Vitals:    02/16/23 0951   BP: 123/77   Pulse: 79   Resp: 18   SpO2: 100%       ECOG Performance Status (grade): 1  0 - able to carry on all pre-disease activity w/out restriction  1 - restricted but able to carry out light work  2 - ambulatory and can self- care but unable to carry out work  3 - bed or chair >50% of waking hours  4 - completely disable, total care, confined to bed or chair    Physical Exam  Constitutional:       Appearance: She is obese. HENT:      Mouth/Throat:      Mouth: Mucous membranes are moist.      Pharynx: Oropharynx is clear. Eyes:      Extraocular Movements: Extraocular movements intact. Pupils: Pupils are equal, round, and reactive to light. Cardiovascular:      Rate and Rhythm: Normal rate and regular rhythm. Pulses: Normal pulses. Heart sounds: Normal heart sounds. Pulmonary:      Effort: Pulmonary effort is normal.      Breath sounds: Normal breath sounds. Abdominal:      General: Bowel sounds are normal.      Palpations: Abdomen is soft. Musculoskeletal:         General: Normal range of motion. Cervical back: Normal range of motion. Neurological:      General: No focal deficit present. Mental Status: She is alert and oriented to person, place, and time. Mental status is at baseline. Psychiatric:         Mood and Affect: Mood normal.         Behavior: Behavior normal.         Thought Content: Thought content normal.         Judgment: Judgment normal.        Wt Readings from Last 3 Encounters:   02/16/23 166 lb (75.3 kg)   02/10/23 162 lb 4.8 oz (73.6 kg)   02/01/23 167 lb (75.8 kg)        Xray Result (most recent):  XR CHEST STANDARD TWO VW 01/30/2023    Narrative  HISTORY: Cough. EXAM: Chest.    TECHNIQUE: Two views of the chest were obtained. COMPARISON: 3/20/2015    FINDINGS: There is no pneumothorax, pneumonia or pleural effusions. Heart and  mediastinal structures are unremarkable. . Visualized bony thorax and soft  tissues are within normal limits. Right internal jugular single lumen chest port  catheter. Tip of the catheters projecting in the region of the cavoatrial  junction. Impression  IMPRESSION:    1. No acute cardiopulmonary process. 2. Right internal jugular single lumen chest port catheter as above.       Lab Results   Component Value Date    WBC 6.5 02/10/2023    HGB 12.0 02/10/2023    HCT 37.5 02/10/2023    MCV 79.8 02/10/2023     02/10/2023     Lab Results   Component Value Date     02/10/2023    K 3.8 02/10/2023     02/10/2023    CO2 27 02/10/2023    BUN 21 (H) 02/10/2023    CREATININE 0.70 02/10/2023    GLUCOSE 167 (H) 02/10/2023    CALCIUM 8.5 02/10/2023    PROT 5.9 (L) 02/10/2023    LABALBU 3.0 (L) 02/10/2023    BILITOT 0.3 02/10/2023    ALKPHOS 103 02/10/2023    AST 37 02/10/2023    ALT 69 (H) 02/10/2023    GFRAA >60 09/16/2022    AGRATIO 1.0 02/10/2023    GLOB 2.9 02/10/2023       Lab Results   Component Value Date    IRON 44 (L) 02/06/2023    TIBC 423 02/06/2023    FERRITIN 70 02/06/2023     Iron Saturation   Date Value Ref Range Status   02/06/2023 10 (L) 20 - 50 % Final     Lab Results   Component Value Date    RRLADVLV29 1601 (H) 02/06/2023     Lab Results   Component Value Date    FOLATE 8.9 02/06/2023       DIAGNOSIS     1. Diffuse large b-cell lymphoma, lymph nodes of inguinal region and lower limb ([address]    2. On antineoplastic chemotherapy    3. Chemotherapy induced nausea and vomiting    4. Antineoplastic chemotherapy induced anemia    5. Sinusitis, unspecified chronicity, unspecified location    6. Cough in adult        ASSESSMENT AND PLAN  Diffuse Large B Cell Lymphoma   On Chemotherapy  CINV  --Patient was being followed by Dr. Ji Landa who left the practice. The patient presents to [facility] clinic today for follows up. She was accompanied by her daughter. The patient was agreeable to continue planned therapy at [facility].  --Completed  R mini CHOP, six cycles  POST R mini CHOP continue single agent Rituximab 375mg/msq every 3 weeks for 18 cycles as patient has a doroteo risk lymphoma  --Added dexamethasone 10 mg IV as premedication prior to each Retuximab due to pruritis. --ECHO performed and PET/CT CR  -- She has tolerated therapy without high graded toxicities. -- She will continue Rituximab as planned. -- Lab check CBC, CMP, LDH    CINV  --Zofran PRN  --Today she states she tolerated her treatment well  --Continue infusion time for 5 hours. Sinusitis  --Completed Augmentin, improving      Cough, IMPROVED  --Chest XRAY completed on 01/30/2023 IMPRESSION:  1. No acute cardiopulmonary process. 2. Right internal jugular single lumen chest port catheter as above.   --Patient states this has been resolved    Anemia  --02/10/2023 CBC showed WBC 6.5K/uL, hemoglobin 12.0g/dL with hematocrit of 37.5%. Platelet 008. BUN and Creatinine normal. Iron Sat 10%. Ferritin 70. --Venofer x 3 doses every 2 weeks is recommended. Patient and daughter agreed with plan. She prefers to get this at [facility].  --Patient denies any symptoms at this time  --I advised patient to report any fatigue, shortness of breath, dizziness, headaches, or any concerns or new symptoms  --Patient agreed with plan and verbalized understanding      Follow up in 6 weeks or sooner if indicated. Yeimy Rdz has a reminder for a \"due or due soon\" health maintenance. I have asked patient to contact his primary care provider for follow-up on this health maintenance. All of patient's questions were answered to their apparent satisfaction. They verbally showed understanding and agreement with aforementioned plan.         Priya Jade MD  2/16/23      CC: ERNESTINA Harris - CNP

## 2023-02-20 ENCOUNTER — HOSPITAL ENCOUNTER (OUTPATIENT)
Facility: HOSPITAL | Age: 73
Setting detail: INFUSION SERIES
End: 2023-02-20

## 2023-02-20 VITALS
OXYGEN SATURATION: 100 % | TEMPERATURE: 98.3 F | DIASTOLIC BLOOD PRESSURE: 79 MMHG | HEART RATE: 80 BPM | SYSTOLIC BLOOD PRESSURE: 160 MMHG | RESPIRATION RATE: 16 BRPM

## 2023-02-20 DIAGNOSIS — D64.81 ANTINEOPLASTIC CHEMOTHERAPY INDUCED ANEMIA: ICD-10-CM

## 2023-02-20 DIAGNOSIS — T45.1X5A ANTINEOPLASTIC CHEMOTHERAPY INDUCED ANEMIA: ICD-10-CM

## 2023-02-20 DIAGNOSIS — D50.9 IRON DEFICIENCY ANEMIA, UNSPECIFIED IRON DEFICIENCY ANEMIA TYPE: Primary | ICD-10-CM

## 2023-02-20 PROCEDURE — 2580000003 HC RX 258: Performed by: NURSE PRACTITIONER

## 2023-02-20 PROCEDURE — 6360000002 HC RX W HCPCS: Performed by: NURSE PRACTITIONER

## 2023-02-20 PROCEDURE — 96365 THER/PROPH/DIAG IV INF INIT: CPT

## 2023-02-20 PROCEDURE — 96366 THER/PROPH/DIAG IV INF ADDON: CPT

## 2023-02-20 RX ORDER — ALBUTEROL SULFATE 90 UG/1
4 AEROSOL, METERED RESPIRATORY (INHALATION) PRN
Status: CANCELLED | OUTPATIENT
Start: 2023-03-06

## 2023-02-20 RX ORDER — SODIUM CHLORIDE 0.9 % (FLUSH) 0.9 %
5-40 SYRINGE (ML) INJECTION PRN
Status: CANCELLED | OUTPATIENT
Start: 2023-03-06

## 2023-02-20 RX ORDER — HEPARIN SODIUM (PORCINE) LOCK FLUSH IV SOLN 100 UNIT/ML 100 UNIT/ML
500 SOLUTION INTRAVENOUS PRN
Status: DISCONTINUED | OUTPATIENT
Start: 2023-02-20 | End: 2023-02-21 | Stop reason: HOSPADM

## 2023-02-20 RX ORDER — EPINEPHRINE 1 MG/ML
0.3 INJECTION, SOLUTION, CONCENTRATE INTRAVENOUS PRN
Status: CANCELLED | OUTPATIENT
Start: 2023-03-06

## 2023-02-20 RX ORDER — SODIUM CHLORIDE 9 MG/ML
5-250 INJECTION, SOLUTION INTRAVENOUS PRN
Status: CANCELLED | OUTPATIENT
Start: 2023-03-06

## 2023-02-20 RX ORDER — SODIUM CHLORIDE 9 MG/ML
INJECTION, SOLUTION INTRAVENOUS CONTINUOUS
Status: CANCELLED | OUTPATIENT
Start: 2023-03-06

## 2023-02-20 RX ORDER — ACETAMINOPHEN 325 MG/1
650 TABLET ORAL
Status: CANCELLED | OUTPATIENT
Start: 2023-03-06

## 2023-02-20 RX ORDER — HEPARIN SODIUM (PORCINE) LOCK FLUSH IV SOLN 100 UNIT/ML 100 UNIT/ML
500 SOLUTION INTRAVENOUS PRN
Status: CANCELLED | OUTPATIENT
Start: 2023-03-06

## 2023-02-20 RX ORDER — SODIUM CHLORIDE 0.9 % (FLUSH) 0.9 %
5-40 SYRINGE (ML) INJECTION PRN
Status: DISCONTINUED | OUTPATIENT
Start: 2023-02-20 | End: 2023-02-21 | Stop reason: HOSPADM

## 2023-02-20 RX ORDER — DIPHENHYDRAMINE HYDROCHLORIDE 50 MG/ML
50 INJECTION INTRAMUSCULAR; INTRAVENOUS
Status: CANCELLED | OUTPATIENT
Start: 2023-03-06

## 2023-02-20 RX ORDER — ONDANSETRON 2 MG/ML
8 INJECTION INTRAMUSCULAR; INTRAVENOUS
Status: CANCELLED | OUTPATIENT
Start: 2023-03-06

## 2023-02-20 RX ORDER — SODIUM CHLORIDE 9 MG/ML
INJECTION, SOLUTION INTRAVENOUS CONTINUOUS
Status: DISPENSED | OUTPATIENT
Start: 2023-02-20 | End: 2023-02-20

## 2023-02-20 RX ADMIN — SODIUM CHLORIDE, PRESERVATIVE FREE 10 ML: 5 INJECTION INTRAVENOUS at 10:12

## 2023-02-20 RX ADMIN — SODIUM CHLORIDE: 0.9 INJECTION, SOLUTION INTRAVENOUS at 08:30

## 2023-02-20 RX ADMIN — HEPARIN 500 UNITS: 100 SYRINGE at 10:14

## 2023-02-20 RX ADMIN — IRON SUCROSE 300 MG: 20 INJECTION, SOLUTION INTRAVENOUS at 08:40

## 2023-02-20 NOTE — PROGRESS NOTES
[address] [facility] Progress Note    Date: 2023    Name: Celena Vasques    MRN: [id number]         : 1950    Venofer Infusion 1 of 3    Ms. Keisha Garcia to [place] via wheelchair accompanied by her daughter, at 0800. Ms. Keisha Garcia speaks very little english per patient and daughter. Translation services being used via ipad. Pt was assessed and education was provided. Ms. Reyes Maris vitals were reviewed and patient was observed for 5 minutes prior to treatment. Vitals:    23 0800   BP: (!) 160/79   Pulse: 80   Resp: 16   Temp: 98.3 °F (36.8 °C)   SpO2: 100%       Mediport to right chest wall accessed via 20 G gomez needle x 1 attempt. Flushed easily and had brisk blood return. Venofer 300 mg was initiated @ 177 ml/hr to infuse over 90 minutes. Ms. Keisha Garcia tolerated the infusion, and had no complaints. VS remained stable. Port flushed with NS 10 ml and  500 units of Heparin then removed. No bleeding noted at site. Bandaid applied. Reviewed discharge instructions with patient and her daughter and provided a printed copy from [facility] in [address] [place] [address], including expected side effects (abdominal cramping, nausea, changes in color of urine or feces) and signs of allergic reaction requiring medical attention (itching/hives/rashes, SOB, chest pain, lip/tongue/facial swelling). Patient given printed copy to take home. Patient verbalized understanding of discharge instructions. Patient armband removed and shredded. Ms. Keisha Garcia was discharged from [place] [address] in stable condition at 1015. She is to return on 3/7/2023 at 0800 for 2/3 Venofer.     Christy Mason RN  2023  1:53 PM

## 2023-02-27 RX ORDER — SODIUM CHLORIDE 9 MG/ML
INJECTION, SOLUTION INTRAVENOUS CONTINUOUS
Status: CANCELLED | OUTPATIENT
Start: 2023-03-06

## 2023-02-27 RX ORDER — ALBUTEROL SULFATE 90 UG/1
4 AEROSOL, METERED RESPIRATORY (INHALATION) PRN
Status: CANCELLED | OUTPATIENT
Start: 2023-03-06

## 2023-02-27 RX ORDER — SODIUM CHLORIDE 9 MG/ML
5-250 INJECTION, SOLUTION INTRAVENOUS PRN
Status: CANCELLED | OUTPATIENT
Start: 2023-03-06

## 2023-02-27 RX ORDER — MEPERIDINE HYDROCHLORIDE 50 MG/ML
12.5 INJECTION INTRAMUSCULAR; INTRAVENOUS; SUBCUTANEOUS PRN
Status: CANCELLED | OUTPATIENT
Start: 2023-03-06

## 2023-02-27 RX ORDER — DIPHENHYDRAMINE HYDROCHLORIDE 50 MG/ML
50 INJECTION INTRAMUSCULAR; INTRAVENOUS
Status: CANCELLED | OUTPATIENT
Start: 2023-03-06

## 2023-02-27 RX ORDER — ACETAMINOPHEN 325 MG/1
650 TABLET ORAL
Status: CANCELLED | OUTPATIENT
Start: 2023-03-06

## 2023-02-27 RX ORDER — ONDANSETRON 2 MG/ML
8 INJECTION INTRAMUSCULAR; INTRAVENOUS
Status: CANCELLED | OUTPATIENT
Start: 2023-03-06

## 2023-02-27 RX ORDER — EPINEPHRINE 1 MG/ML
0.3 INJECTION, SOLUTION, CONCENTRATE INTRAVENOUS PRN
Status: CANCELLED | OUTPATIENT
Start: 2023-03-06

## 2023-03-03 ENCOUNTER — HOSPITAL ENCOUNTER (OUTPATIENT)
Facility: HOSPITAL | Age: 73
Setting detail: INFUSION SERIES
End: 2023-03-03

## 2023-03-03 ENCOUNTER — APPOINTMENT (OUTPATIENT)
Dept: INFUSION THERAPY | Age: 73
End: 2023-03-03

## 2023-03-03 VITALS
HEIGHT: 59 IN | OXYGEN SATURATION: 99 % | RESPIRATION RATE: 16 BRPM | TEMPERATURE: 98.4 F | SYSTOLIC BLOOD PRESSURE: 140 MMHG | DIASTOLIC BLOOD PRESSURE: 79 MMHG | BODY MASS INDEX: 33.32 KG/M2 | WEIGHT: 165.3 LBS

## 2023-03-03 LAB
ALBUMIN SERPL-MCNC: 3.3 G/DL (ref 3.4–5)
ALBUMIN/GLOB SERPL: 1.1 (ref 0.8–1.7)
ALP SERPL-CCNC: 98 U/L (ref 45–117)
ALT SERPL-CCNC: 56 U/L (ref 13–56)
ANION GAP SERPL CALC-SCNC: 3 MMOL/L (ref 3–18)
AST SERPL-CCNC: 26 U/L (ref 10–38)
BASOPHILS # BLD: 0 K/UL (ref 0–0.1)
BASOPHILS NFR BLD: 1 % (ref 0–2)
BILIRUB SERPL-MCNC: 0.3 MG/DL (ref 0.2–1)
BUN SERPL-MCNC: 16 MG/DL (ref 7–18)
BUN/CREAT SERPL: 25 (ref 12–20)
CALCIUM SERPL-MCNC: 9 MG/DL (ref 8.5–10.1)
CHLORIDE SERPL-SCNC: 106 MMOL/L (ref 100–111)
CO2 SERPL-SCNC: 28 MMOL/L (ref 21–32)
CREAT SERPL-MCNC: 0.63 MG/DL (ref 0.6–1.3)
DIFFERENTIAL METHOD BLD: ABNORMAL
EOSINOPHIL # BLD: 0.3 K/UL (ref 0–0.4)
EOSINOPHIL NFR BLD: 6 % (ref 0–5)
ERYTHROCYTE [DISTWIDTH] IN BLOOD BY AUTOMATED COUNT: 15.7 % (ref 11.6–14.5)
GLOBULIN SER CALC-MCNC: 3 G/DL (ref 2–4)
GLUCOSE SERPL-MCNC: 166 MG/DL (ref 74–99)
HCT VFR BLD AUTO: 36.4 % (ref 35–45)
HGB BLD-MCNC: 11.6 G/DL (ref 12–16)
IMM GRANULOCYTES # BLD AUTO: 0 K/UL (ref 0–0.04)
IMM GRANULOCYTES NFR BLD AUTO: 1 % (ref 0–0.5)
LYMPHOCYTES # BLD: 1.2 K/UL (ref 0.9–3.6)
LYMPHOCYTES NFR BLD: 24 % (ref 21–52)
MCH RBC QN AUTO: 26.4 PG (ref 24–34)
MCHC RBC AUTO-ENTMCNC: 31.9 G/DL (ref 31–37)
MCV RBC AUTO: 82.9 FL (ref 78–100)
MONOCYTES # BLD: 0.6 K/UL (ref 0.05–1.2)
MONOCYTES NFR BLD: 12 % (ref 3–10)
NEUTS SEG # BLD: 2.7 K/UL (ref 1.8–8)
NEUTS SEG NFR BLD: 57 % (ref 40–73)
NRBC # BLD: 0 K/UL (ref 0–0.01)
NRBC BLD-RTO: 0 PER 100 WBC
PLATELET # BLD AUTO: 221 K/UL (ref 135–420)
PMV BLD AUTO: 9.8 FL (ref 9.2–11.8)
POTASSIUM SERPL-SCNC: 3.9 MMOL/L (ref 3.5–5.5)
PROT SERPL-MCNC: 6.3 G/DL (ref 6.4–8.2)
RBC # BLD AUTO: 4.39 M/UL (ref 4.2–5.3)
SODIUM SERPL-SCNC: 137 MMOL/L (ref 136–145)
WBC # BLD AUTO: 4.8 K/UL (ref 4.6–13.2)

## 2023-03-03 PROCEDURE — 6360000002 HC RX W HCPCS

## 2023-03-03 PROCEDURE — 85025 COMPLETE CBC W/AUTO DIFF WBC: CPT

## 2023-03-03 PROCEDURE — 2580000003 HC RX 258: Performed by: INTERNAL MEDICINE

## 2023-03-03 PROCEDURE — 80053 COMPREHEN METABOLIC PANEL: CPT

## 2023-03-03 PROCEDURE — 36591 DRAW BLOOD OFF VENOUS DEVICE: CPT

## 2023-03-03 RX ORDER — HEPARIN SODIUM (PORCINE) LOCK FLUSH IV SOLN 100 UNIT/ML 100 UNIT/ML
500 SOLUTION INTRAVENOUS PRN
Status: DISCONTINUED | OUTPATIENT
Start: 2023-03-03 | End: 2023-04-14 | Stop reason: ALTCHOICE

## 2023-03-03 RX ORDER — HEPARIN SODIUM (PORCINE) LOCK FLUSH IV SOLN 100 UNIT/ML 100 UNIT/ML
SOLUTION INTRAVENOUS
Status: COMPLETED
Start: 2023-03-03 | End: 2023-03-03

## 2023-03-03 RX ORDER — SODIUM CHLORIDE 0.9 % (FLUSH) 0.9 %
5-40 SYRINGE (ML) INJECTION PRN
Status: DISCONTINUED | OUTPATIENT
Start: 2023-03-03 | End: 2023-04-14 | Stop reason: ALTCHOICE

## 2023-03-03 RX ADMIN — HEPARIN SODIUM (PORCINE) LOCK FLUSH IV SOLN 100 UNIT/ML 500 UNITS: 100 SOLUTION at 08:21

## 2023-03-03 RX ADMIN — Medication 500 UNITS: at 08:21

## 2023-03-03 RX ADMIN — SODIUM CHLORIDE, PRESERVATIVE FREE 10 ML: 5 INJECTION INTRAVENOUS at 08:21

## 2023-03-03 NOTE — PROGRESS NOTES
[facility] Progress Note    Date: March 3, 2023    Name: Dottie Manuel              MRN: [id number]              : 1950    Pre-Chemo Labs      Ms. Florian Villarreal was assessed and education was provided. Ms. Mariana Frye vitals were reviewed. Vitals:    23 0800   BP: (!) 140/79   Resp: 16   Temp: 98.4 °F (36.9 °C)   SpO2: 99%         Right chest medi-port accessed and blood drawn per orders, per protocol without complications. Port flushed and de-accessed per orders, per protocol without complications. Band aid applied to site       Ms. Florian Villarreal tolerated the blood draw, and had no complaints at this time. Armband removed and shredded. Ms. Florian Villarreal tolerated infusion, and had no complaints at this time. Ms. Florian Villarreal was discharged from [place] [address] in stable condition at [address] [place] [address]. She is to return on 3/6/23 for her next appointment.     Chantal Mendoza RN  March 3, 2023  8:55 AM

## 2023-03-06 ENCOUNTER — HOSPITAL ENCOUNTER (OUTPATIENT)
Facility: HOSPITAL | Age: 73
Setting detail: INFUSION SERIES
End: 2023-03-06

## 2023-03-06 ENCOUNTER — HOSPITAL ENCOUNTER (OUTPATIENT)
Dept: INFUSION THERAPY | Age: 73
End: 2023-03-06

## 2023-03-06 VITALS
OXYGEN SATURATION: 98 % | HEART RATE: 78 BPM | WEIGHT: 165.25 LBS | RESPIRATION RATE: 16 BRPM | TEMPERATURE: 98 F | HEIGHT: 59 IN | SYSTOLIC BLOOD PRESSURE: 166 MMHG | DIASTOLIC BLOOD PRESSURE: 82 MMHG | BODY MASS INDEX: 33.32 KG/M2

## 2023-03-06 DIAGNOSIS — C83.30 DIFFUSE LARGE B-CELL LYMPHOMA, UNSPECIFIED BODY REGION (HCC): Primary | ICD-10-CM

## 2023-03-06 PROCEDURE — 96367 TX/PROPH/DG ADDL SEQ IV INF: CPT

## 2023-03-06 PROCEDURE — 96374 THER/PROPH/DIAG INJ IV PUSH: CPT

## 2023-03-06 PROCEDURE — 96415 CHEMO IV INFUSION ADDL HR: CPT

## 2023-03-06 PROCEDURE — 6370000000 HC RX 637 (ALT 250 FOR IP): Performed by: INTERNAL MEDICINE

## 2023-03-06 PROCEDURE — 2580000003 HC RX 258: Performed by: INTERNAL MEDICINE

## 2023-03-06 PROCEDURE — 6360000002 HC RX W HCPCS: Performed by: INTERNAL MEDICINE

## 2023-03-06 PROCEDURE — 96413 CHEMO IV INFUSION 1 HR: CPT

## 2023-03-06 RX ORDER — DIPHENHYDRAMINE HYDROCHLORIDE 50 MG/ML
50 INJECTION INTRAMUSCULAR; INTRAVENOUS ONCE
Status: COMPLETED | OUTPATIENT
Start: 2023-03-06 | End: 2023-03-06

## 2023-03-06 RX ORDER — HEPARIN SODIUM (PORCINE) LOCK FLUSH IV SOLN 100 UNIT/ML 100 UNIT/ML
500 SOLUTION INTRAVENOUS PRN
Status: DISCONTINUED | OUTPATIENT
Start: 2023-03-06 | End: 2023-03-07 | Stop reason: HOSPADM

## 2023-03-06 RX ORDER — SODIUM CHLORIDE 9 MG/ML
5-250 INJECTION, SOLUTION INTRAVENOUS PRN
Status: DISCONTINUED | OUTPATIENT
Start: 2023-03-06 | End: 2023-03-07 | Stop reason: HOSPADM

## 2023-03-06 RX ORDER — SODIUM CHLORIDE 9 MG/ML
5-40 INJECTION INTRAVENOUS PRN
Status: DISCONTINUED | OUTPATIENT
Start: 2023-03-06 | End: 2023-03-07 | Stop reason: HOSPADM

## 2023-03-06 RX ORDER — ACETAMINOPHEN 325 MG/1
650 TABLET ORAL ONCE
Status: COMPLETED | OUTPATIENT
Start: 2023-03-06 | End: 2023-03-06

## 2023-03-06 RX ADMIN — RITUXIMAB 660 MG: 10 INJECTION, SOLUTION INTRAVENOUS at 10:41

## 2023-03-06 RX ADMIN — SODIUM CHLORIDE 250 ML/HR: 9 INJECTION, SOLUTION INTRAVENOUS at 08:53

## 2023-03-06 RX ADMIN — SODIUM CHLORIDE 10 ML: 9 INJECTION, SOLUTION INTRAMUSCULAR; INTRAVENOUS; SUBCUTANEOUS at 14:56

## 2023-03-06 RX ADMIN — Medication 500 UNITS: at 15:00

## 2023-03-06 RX ADMIN — DEXAMETHASONE SODIUM PHOSPHATE 10 MG: 4 INJECTION INTRA-ARTICULAR; INTRALESIONAL; INTRAMUSCULAR; INTRAVENOUS; SOFT TISSUE at 08:57

## 2023-03-06 RX ADMIN — ACETAMINOPHEN 650 MG: 325 TABLET ORAL at 08:47

## 2023-03-06 RX ADMIN — DIPHENHYDRAMINE HYDROCHLORIDE 50 MG: 50 INJECTION INTRAMUSCULAR; INTRAVENOUS at 08:54

## 2023-03-06 ASSESSMENT — PAIN DESCRIPTION - LOCATION: LOCATION: ARM

## 2023-03-06 ASSESSMENT — PAIN DESCRIPTION - ORIENTATION: ORIENTATION: LEFT

## 2023-03-06 ASSESSMENT — PAIN SCALES - GENERAL: PAINLEVEL_OUTOF10: 2

## 2023-03-06 ASSESSMENT — PAIN DESCRIPTION - DESCRIPTORS: DESCRIPTORS: BURNING

## 2023-03-06 ASSESSMENT — PAIN DESCRIPTION - PAIN TYPE: TYPE: ACUTE PAIN

## 2023-03-06 NOTE — PROGRESS NOTES
[facility] Progress Note    Date: 2023    Name: Rafa Soni              MRN: [id number]              : 1950    Chemotherapy Cycle:C13D1  Rituxan       Ms. Ness Franco was assessed and education was provided to her and her daughter regarding treatment plan. Ms. Ness Franco speaks very little english per patient and daughter. Translation services being used via ipad. Treatment education reviewed with Ms. Ness Franco and daughter. She denies fever, runny nose any n/v/d. Pt is afebrile and in NAD. Ms. Atilano Dancer vitals were reviewed. Visit Vitals  BP (!) 162/79 (BP 1 Location: Right upper arm, BP Patient Position: Sitting)   Pulse 74   Temp 97.7 °F (36.5 °C)   Resp 16   SpO2 98%       Lab results were obtained and reviewed dated 23. Mediport accessed with 20 gauge 0.75 inch needle without complications. Port flushes without resistance and positive brisk blood return noted. Treatment initiated per orders. NS infusing IV 100ml/hr. Pre-medications (Tylenol 650 mg po, Benadryl 50 mg IV, Dexadron 10mg IVP) were administered as ordered. Rituxan 660 mg was initiated at 50 mg/hr (25 ml/hr) for 30 minutes, increased by 25 ml every 30 minutes to max rate of  200 ml/hr per patient/daughter request,  without complications. Pt complained of mild headache post treatment. Pt encouraged to take eat , says she will eat when she gets home. IV fluids completed, pt tolerated well, is resting comfortably. Port flushed and de-accessed per orders, per protocol without complications. Band aid applied to site. Ms. Ness Franco tolerated infusions, and had no complaints at this time. Armband removed and shredded. Ms. Nses Franco was discharged from [place] [address] in stable condition at 1500. She is to return on 3/7/23 at 0800 for her next appointment for her Venofer treatment.     Catherene Lesch, RN  2023

## 2023-03-07 ENCOUNTER — HOSPITAL ENCOUNTER (OUTPATIENT)
Facility: HOSPITAL | Age: 73
Setting detail: INFUSION SERIES
End: 2023-03-07

## 2023-03-07 VITALS
DIASTOLIC BLOOD PRESSURE: 79 MMHG | HEART RATE: 83 BPM | TEMPERATURE: 98.1 F | OXYGEN SATURATION: 100 % | RESPIRATION RATE: 16 BRPM | SYSTOLIC BLOOD PRESSURE: 155 MMHG

## 2023-03-07 DIAGNOSIS — D64.81 ANTINEOPLASTIC CHEMOTHERAPY INDUCED ANEMIA: ICD-10-CM

## 2023-03-07 DIAGNOSIS — T45.1X5A ANTINEOPLASTIC CHEMOTHERAPY INDUCED ANEMIA: ICD-10-CM

## 2023-03-07 DIAGNOSIS — D50.9 IRON DEFICIENCY ANEMIA, UNSPECIFIED IRON DEFICIENCY ANEMIA TYPE: Primary | ICD-10-CM

## 2023-03-07 PROCEDURE — 96366 THER/PROPH/DIAG IV INF ADDON: CPT

## 2023-03-07 PROCEDURE — 2580000003 HC RX 258: Performed by: NURSE PRACTITIONER

## 2023-03-07 PROCEDURE — 6360000002 HC RX W HCPCS: Performed by: NURSE PRACTITIONER

## 2023-03-07 PROCEDURE — 96365 THER/PROPH/DIAG IV INF INIT: CPT

## 2023-03-07 RX ORDER — SODIUM CHLORIDE 9 MG/ML
5-250 INJECTION, SOLUTION INTRAVENOUS PRN
OUTPATIENT
Start: 2023-03-20

## 2023-03-07 RX ORDER — SODIUM CHLORIDE 9 MG/ML
INJECTION, SOLUTION INTRAVENOUS CONTINUOUS
Status: CANCELLED | OUTPATIENT
Start: 2023-03-20

## 2023-03-07 RX ORDER — DIPHENHYDRAMINE HYDROCHLORIDE 50 MG/ML
50 INJECTION INTRAMUSCULAR; INTRAVENOUS
OUTPATIENT
Start: 2023-03-20

## 2023-03-07 RX ORDER — SODIUM CHLORIDE 9 MG/ML
INJECTION, SOLUTION INTRAVENOUS CONTINUOUS
OUTPATIENT
Start: 2023-03-20

## 2023-03-07 RX ORDER — HEPARIN SODIUM (PORCINE) LOCK FLUSH IV SOLN 100 UNIT/ML 100 UNIT/ML
500 SOLUTION INTRAVENOUS PRN
Status: CANCELLED | OUTPATIENT
Start: 2023-03-20

## 2023-03-07 RX ORDER — EPINEPHRINE 1 MG/ML
0.3 INJECTION, SOLUTION, CONCENTRATE INTRAVENOUS PRN
OUTPATIENT
Start: 2023-03-20

## 2023-03-07 RX ORDER — SODIUM CHLORIDE 0.9 % (FLUSH) 0.9 %
5-40 SYRINGE (ML) INJECTION PRN
Status: CANCELLED | OUTPATIENT
Start: 2023-03-20

## 2023-03-07 RX ORDER — ACETAMINOPHEN 325 MG/1
650 TABLET ORAL
OUTPATIENT
Start: 2023-03-20

## 2023-03-07 RX ORDER — ALBUTEROL SULFATE 90 UG/1
4 AEROSOL, METERED RESPIRATORY (INHALATION) PRN
OUTPATIENT
Start: 2023-03-20

## 2023-03-07 RX ORDER — HEPARIN SODIUM (PORCINE) LOCK FLUSH IV SOLN 100 UNIT/ML 100 UNIT/ML
500 SOLUTION INTRAVENOUS PRN
Status: DISCONTINUED | OUTPATIENT
Start: 2023-03-07 | End: 2023-03-08 | Stop reason: HOSPADM

## 2023-03-07 RX ORDER — SODIUM CHLORIDE 0.9 % (FLUSH) 0.9 %
5-40 SYRINGE (ML) INJECTION PRN
Status: DISCONTINUED | OUTPATIENT
Start: 2023-03-07 | End: 2023-03-08 | Stop reason: HOSPADM

## 2023-03-07 RX ORDER — SODIUM CHLORIDE 9 MG/ML
INJECTION, SOLUTION INTRAVENOUS CONTINUOUS
Status: DISPENSED | OUTPATIENT
Start: 2023-03-07 | End: 2023-03-07

## 2023-03-07 RX ORDER — ONDANSETRON 2 MG/ML
8 INJECTION INTRAMUSCULAR; INTRAVENOUS
OUTPATIENT
Start: 2023-03-20

## 2023-03-07 RX ADMIN — SODIUM CHLORIDE, PRESERVATIVE FREE 10 ML: 5 INJECTION INTRAVENOUS at 10:20

## 2023-03-07 RX ADMIN — IRON SUCROSE 300 MG: 20 INJECTION, SOLUTION INTRAVENOUS at 08:30

## 2023-03-07 RX ADMIN — SODIUM CHLORIDE: 9 INJECTION, SOLUTION INTRAVENOUS at 08:25

## 2023-03-07 RX ADMIN — SODIUM CHLORIDE, PRESERVATIVE FREE 10 ML: 5 INJECTION INTRAVENOUS at 08:25

## 2023-03-07 ASSESSMENT — PAIN SCALES - GENERAL: PAINLEVEL_OUTOF10: 0

## 2023-03-07 NOTE — PROGRESS NOTES
[facility] Progress Note    Date: 2023    Name: Leotha Cockayne              MRN: [id number]              : 1950    Venofer 2 of 3    Ms. Eli Mendoza to [facility] via wheelchair accompanied by her daughter, at 0800. Ms. Eli Mendoza speaks very little english per patient and daughter. Translation services being used via ipad. Pt was assessed and education was provided. Ms. Connelly Im vitals were reviewed. Vitals:    23 0815   BP: (!) 155/79   Pulse: 83   Resp: 16   Temp: 98.1 °F (36.7 °C)   SpO2: 100%     Mediport to right chest wall accessed via 20 G gomez needle x 1 attempt. Flushed easily and had brisk blood return. Venofer 300 mg was initiated @ 177 ml/hr to infuse over 90 minutes. Ms. Eli Mendoza tolerated the infusion, and had no complaints. VS remained stable. Port flushed with NS 10 ml and  500 units of Heparin then removed. No bleeding noted at site. Bandaid applied. Reviewed discharge instructions with patient and her daughter and provided a printed copy from [employer] in [address] [facility], including expected side effects (abdominal cramping, nausea, changes in color of urine or feces) and signs of allergic reaction requiring medical attention (itching/hives/rashes, SOB, chest pain, lip/tongue/facial swelling). Patient given printed copy to take home. Patient verbalized understanding of discharge instructions. Patient armband removed and shredded. Ms. Eli Mendoza was discharged from [place] [address] in stable condition at 1030. She is to return on 3/21/23 at 0800 for her next appointment.     Katy Simms RN  2023  8:49 AM

## 2023-03-20 RX ORDER — SODIUM CHLORIDE 9 MG/ML
5-250 INJECTION, SOLUTION INTRAVENOUS PRN
Status: CANCELLED | OUTPATIENT
Start: 2023-03-27

## 2023-03-20 RX ORDER — ALBUTEROL SULFATE 90 UG/1
4 AEROSOL, METERED RESPIRATORY (INHALATION) PRN
Status: CANCELLED | OUTPATIENT
Start: 2023-03-27

## 2023-03-20 RX ORDER — SODIUM CHLORIDE 9 MG/ML
INJECTION, SOLUTION INTRAVENOUS CONTINUOUS
Status: CANCELLED | OUTPATIENT
Start: 2023-03-27

## 2023-03-20 RX ORDER — DIPHENHYDRAMINE HYDROCHLORIDE 50 MG/ML
50 INJECTION INTRAMUSCULAR; INTRAVENOUS
Status: CANCELLED | OUTPATIENT
Start: 2023-03-27

## 2023-03-20 RX ORDER — EPINEPHRINE 1 MG/ML
0.3 INJECTION, SOLUTION, CONCENTRATE INTRAVENOUS PRN
Status: CANCELLED | OUTPATIENT
Start: 2023-03-27

## 2023-03-20 RX ORDER — ACETAMINOPHEN 325 MG/1
650 TABLET ORAL
Status: CANCELLED | OUTPATIENT
Start: 2023-03-27

## 2023-03-20 RX ORDER — ONDANSETRON 2 MG/ML
8 INJECTION INTRAMUSCULAR; INTRAVENOUS
Status: CANCELLED | OUTPATIENT
Start: 2023-03-27

## 2023-03-20 RX ORDER — MEPERIDINE HYDROCHLORIDE 25 MG/ML
12.5 INJECTION INTRAMUSCULAR; INTRAVENOUS; SUBCUTANEOUS PRN
Status: CANCELLED | OUTPATIENT
Start: 2023-03-27

## 2023-03-21 ENCOUNTER — HOSPITAL ENCOUNTER (OUTPATIENT)
Facility: HOSPITAL | Age: 73
Setting detail: INFUSION SERIES
End: 2023-03-21

## 2023-03-21 VITALS
OXYGEN SATURATION: 97 % | HEART RATE: 70 BPM | TEMPERATURE: 97.5 F | RESPIRATION RATE: 16 BRPM | SYSTOLIC BLOOD PRESSURE: 154 MMHG | DIASTOLIC BLOOD PRESSURE: 86 MMHG

## 2023-03-21 DIAGNOSIS — T45.1X5A ANTINEOPLASTIC CHEMOTHERAPY INDUCED ANEMIA: ICD-10-CM

## 2023-03-21 DIAGNOSIS — D50.9 IRON DEFICIENCY ANEMIA, UNSPECIFIED IRON DEFICIENCY ANEMIA TYPE: Primary | ICD-10-CM

## 2023-03-21 DIAGNOSIS — D64.81 ANTINEOPLASTIC CHEMOTHERAPY INDUCED ANEMIA: ICD-10-CM

## 2023-03-21 PROCEDURE — 96365 THER/PROPH/DIAG IV INF INIT: CPT

## 2023-03-21 PROCEDURE — 2580000003 HC RX 258: Performed by: NURSE PRACTITIONER

## 2023-03-21 PROCEDURE — 6360000002 HC RX W HCPCS: Performed by: NURSE PRACTITIONER

## 2023-03-21 PROCEDURE — 96366 THER/PROPH/DIAG IV INF ADDON: CPT

## 2023-03-21 RX ORDER — SODIUM CHLORIDE 0.9 % (FLUSH) 0.9 %
5-40 SYRINGE (ML) INJECTION PRN
OUTPATIENT
Start: 2023-03-21

## 2023-03-21 RX ORDER — ONDANSETRON 2 MG/ML
8 INJECTION INTRAMUSCULAR; INTRAVENOUS
OUTPATIENT
Start: 2023-03-21

## 2023-03-21 RX ORDER — HEPARIN SODIUM (PORCINE) LOCK FLUSH IV SOLN 100 UNIT/ML 100 UNIT/ML
500 SOLUTION INTRAVENOUS PRN
Status: CANCELLED | OUTPATIENT
Start: 2023-03-21

## 2023-03-21 RX ORDER — SODIUM CHLORIDE 9 MG/ML
INJECTION, SOLUTION INTRAVENOUS CONTINUOUS
OUTPATIENT
Start: 2023-03-21

## 2023-03-21 RX ORDER — ACETAMINOPHEN 325 MG/1
650 TABLET ORAL
OUTPATIENT
Start: 2023-03-21

## 2023-03-21 RX ORDER — EPINEPHRINE 1 MG/ML
0.3 INJECTION, SOLUTION, CONCENTRATE INTRAVENOUS PRN
OUTPATIENT
Start: 2023-03-21

## 2023-03-21 RX ORDER — DIPHENHYDRAMINE HYDROCHLORIDE 50 MG/ML
50 INJECTION INTRAMUSCULAR; INTRAVENOUS
OUTPATIENT
Start: 2023-03-21

## 2023-03-21 RX ORDER — SODIUM CHLORIDE 0.9 % (FLUSH) 0.9 %
5-40 SYRINGE (ML) INJECTION PRN
Status: DISCONTINUED | OUTPATIENT
Start: 2023-03-21 | End: 2023-03-22 | Stop reason: HOSPADM

## 2023-03-21 RX ORDER — SODIUM CHLORIDE 9 MG/ML
5-250 INJECTION, SOLUTION INTRAVENOUS PRN
OUTPATIENT
Start: 2023-03-21

## 2023-03-21 RX ORDER — HEPARIN SODIUM (PORCINE) LOCK FLUSH IV SOLN 100 UNIT/ML 100 UNIT/ML
500 SOLUTION INTRAVENOUS PRN
Status: DISCONTINUED | OUTPATIENT
Start: 2023-03-21 | End: 2023-03-22 | Stop reason: HOSPADM

## 2023-03-21 RX ORDER — SODIUM CHLORIDE 9 MG/ML
INJECTION, SOLUTION INTRAVENOUS CONTINUOUS
Status: DISPENSED | OUTPATIENT
Start: 2023-03-21 | End: 2023-03-21

## 2023-03-21 RX ORDER — ALBUTEROL SULFATE 90 UG/1
4 AEROSOL, METERED RESPIRATORY (INHALATION) PRN
OUTPATIENT
Start: 2023-03-21

## 2023-03-21 RX ADMIN — HEPARIN 500 UNITS: 100 SYRINGE at 12:07

## 2023-03-21 RX ADMIN — SODIUM CHLORIDE: 0.9 INJECTION, SOLUTION INTRAVENOUS at 08:31

## 2023-03-21 RX ADMIN — SODIUM CHLORIDE, PRESERVATIVE FREE 10 ML: 5 INJECTION INTRAVENOUS at 12:05

## 2023-03-21 RX ADMIN — IRON SUCROSE 400 MG: 20 INJECTION, SOLUTION INTRAVENOUS at 08:31

## 2023-03-21 ASSESSMENT — PAIN SCALES - GENERAL: PAINLEVEL_OUTOF10: 0

## 2023-03-21 NOTE — PROGRESS NOTES
[facility] Progress Note    Date: 2023    Name: Sudhir Sweet              MRN: [id number]              : 1950    Venofer 3 of 3    Ms. Sanjeev Ca to [facility] via wheelchair accompanied by her daughter, at 0800. Ms. Sanjeev Ca speaks very little english per patient and daughter. Translation services being used via ipad. Pt was assessed and education was provided. She denies any allergic reactions to the current treatment. Pt reports headaches after previous treatments. Patient encouraged to take something for the headaches and reassured that this was a common after receiving iron. Ms. Braulio Gaston vitals were reviewed. Vitals:    23 0815   BP: (!) 154/86   Pulse: 70   Resp: 16   Temp: 97.5 °F (36.4 °C)   SpO2: 97%     Mediport to right chest wall accessed via 20 G gomez needle x 1 attempt. Flushed easily and had brisk blood return. Venofer 400 mg was initiated @ 108 ml/hr to infuse over 120 minutes. Ms. Sanjeev Ca tolerated the infusion, and had no complaints. VS remained stable. Port flushed with NS 10 ml and  500 units of Heparin then removed. No bleeding noted at site. Bandaid applied. Patient armband removed and shredded. Ms. Sanjeev Ca was discharged from [place] [address] in stable condition at 1240. She has no further iron infusions at this time.     Bryan Isaacs RN  2023  8:54 AM

## 2023-03-24 ENCOUNTER — HOSPITAL ENCOUNTER (OUTPATIENT)
Facility: HOSPITAL | Age: 73
Setting detail: INFUSION SERIES
End: 2023-03-24

## 2023-03-24 ENCOUNTER — APPOINTMENT (OUTPATIENT)
Dept: INFUSION THERAPY | Age: 73
End: 2023-03-24

## 2023-03-24 VITALS
HEART RATE: 72 BPM | HEIGHT: 59 IN | SYSTOLIC BLOOD PRESSURE: 156 MMHG | DIASTOLIC BLOOD PRESSURE: 78 MMHG | RESPIRATION RATE: 16 BRPM | WEIGHT: 164 LBS | BODY MASS INDEX: 33.06 KG/M2 | OXYGEN SATURATION: 98 % | TEMPERATURE: 98.2 F

## 2023-03-24 LAB
ALBUMIN SERPL-MCNC: 3.3 G/DL (ref 3.4–5)
ALBUMIN/GLOB SERPL: 1.1 (ref 0.8–1.7)
ALP SERPL-CCNC: 88 U/L (ref 45–117)
ALT SERPL-CCNC: 68 U/L (ref 13–56)
ANION GAP SERPL CALC-SCNC: 7 MMOL/L (ref 3–18)
AST SERPL-CCNC: 38 U/L (ref 10–38)
BASO+EOS+MONOS # BLD AUTO: 1.1 K/UL (ref 0–2.3)
BASO+EOS+MONOS NFR BLD AUTO: 26 % (ref 0.1–17)
BILIRUB SERPL-MCNC: 0.3 MG/DL (ref 0.2–1)
BUN SERPL-MCNC: 16 MG/DL (ref 7–18)
BUN/CREAT SERPL: 28 (ref 12–20)
CALCIUM SERPL-MCNC: 8.6 MG/DL (ref 8.5–10.1)
CHLORIDE SERPL-SCNC: 108 MMOL/L (ref 100–111)
CO2 SERPL-SCNC: 26 MMOL/L (ref 21–32)
CREAT SERPL-MCNC: 0.58 MG/DL (ref 0.6–1.3)
DIFFERENTIAL METHOD BLD: ABNORMAL
ERYTHROCYTE [DISTWIDTH] IN BLOOD BY AUTOMATED COUNT: 15 % (ref 11.5–14.5)
GLOBULIN SER CALC-MCNC: 3.1 G/DL (ref 2–4)
GLUCOSE SERPL-MCNC: 103 MG/DL (ref 74–99)
HCT VFR BLD AUTO: 37.1 % (ref 36–48)
HGB BLD-MCNC: 12.1 G/DL (ref 12–16)
LYMPHOCYTES # BLD: 1.5 K/UL (ref 1.1–5.9)
LYMPHOCYTES NFR BLD: 37 % (ref 14–44)
MCH RBC QN AUTO: 26.6 PG (ref 25–35)
MCHC RBC AUTO-ENTMCNC: 32.6 G/DL (ref 31–37)
MCV RBC AUTO: 81.5 FL (ref 78–102)
NEUTS SEG # BLD: 1.6 K/UL (ref 1.8–9.5)
NEUTS SEG NFR BLD: 38 % (ref 40–70)
PLATELET # BLD AUTO: 228 K/UL (ref 140–440)
POTASSIUM SERPL-SCNC: 3.7 MMOL/L (ref 3.5–5.5)
PROT SERPL-MCNC: 6.4 G/DL (ref 6.4–8.2)
RBC # BLD AUTO: 4.55 M/UL (ref 4.1–5.1)
SODIUM SERPL-SCNC: 141 MMOL/L (ref 136–145)
WBC # BLD AUTO: 4.2 K/UL (ref 4.5–13)

## 2023-03-24 PROCEDURE — 6360000002 HC RX W HCPCS: Performed by: INTERNAL MEDICINE

## 2023-03-24 PROCEDURE — 36591 DRAW BLOOD OFF VENOUS DEVICE: CPT

## 2023-03-24 PROCEDURE — 2580000003 HC RX 258: Performed by: INTERNAL MEDICINE

## 2023-03-24 PROCEDURE — 80053 COMPREHEN METABOLIC PANEL: CPT

## 2023-03-24 PROCEDURE — 85025 COMPLETE CBC W/AUTO DIFF WBC: CPT

## 2023-03-24 RX ORDER — HEPARIN SODIUM (PORCINE) LOCK FLUSH IV SOLN 100 UNIT/ML 100 UNIT/ML
500 SOLUTION INTRAVENOUS PRN
Status: DISCONTINUED | OUTPATIENT
Start: 2023-03-24 | End: 2023-04-14 | Stop reason: ALTCHOICE

## 2023-03-24 RX ORDER — SODIUM CHLORIDE 0.9 % (FLUSH) 0.9 %
5-40 SYRINGE (ML) INJECTION PRN
Status: DISCONTINUED | OUTPATIENT
Start: 2023-03-24 | End: 2023-04-14 | Stop reason: ALTCHOICE

## 2023-03-24 RX ADMIN — Medication 500 UNITS: at 08:20

## 2023-03-24 RX ADMIN — SODIUM CHLORIDE, PRESERVATIVE FREE 10 ML: 5 INJECTION INTRAVENOUS at 08:20

## 2023-03-24 ASSESSMENT — PAIN SCALES - GENERAL: PAINLEVEL_OUTOF10: 0

## 2023-03-24 NOTE — PROGRESS NOTES
[facility] Progress Note    Date: 2023    Name: Evan Lazo              MRN: [id number]              : 1950    Pre-Chemo labs      Ms. Bhaskar Farias was assessed and education was provided. Ms. Gilberto Damian vitals were reviewed. Vitals:    23 0815   BP: (!) 156/78   Pulse: 72   Resp: 16   Temp: 98.2 °F (36.8 °C)   SpO2: 98%      Right chest medi-port accessed and blood drawn per orders, per protocol without complications. Port flushed and de-accessed per orders, per protocol without complications. Band aid applied to site       Ms. Bhaskar Farias tolerated the blood draw, and had no complaints at this time. Armband removed and shredded. Ms. Bhaskar Farias tolerated infusion, and had no complaints at this time. Ms. Bhaskar Farias was discharged from [place] [address] in stable condition at [address] [place] [address]. She is to return on 3/27/23 at 0800 for her next appointment.     Eli Zavala RN  2023  8:31 AM

## 2023-03-27 ENCOUNTER — HOSPITAL ENCOUNTER (OUTPATIENT)
Facility: HOSPITAL | Age: 73
Setting detail: INFUSION SERIES
End: 2023-03-27

## 2023-03-27 ENCOUNTER — APPOINTMENT (OUTPATIENT)
Dept: INFUSION THERAPY | Age: 73
End: 2023-03-27

## 2023-03-27 VITALS
TEMPERATURE: 97.5 F | SYSTOLIC BLOOD PRESSURE: 135 MMHG | OXYGEN SATURATION: 100 % | RESPIRATION RATE: 16 BRPM | HEART RATE: 76 BPM | DIASTOLIC BLOOD PRESSURE: 55 MMHG

## 2023-03-27 DIAGNOSIS — C83.30 DIFFUSE LARGE B-CELL LYMPHOMA, UNSPECIFIED BODY REGION (HCC): Primary | ICD-10-CM

## 2023-03-27 PROCEDURE — 96375 TX/PRO/DX INJ NEW DRUG ADDON: CPT

## 2023-03-27 PROCEDURE — 6360000002 HC RX W HCPCS: Performed by: INTERNAL MEDICINE

## 2023-03-27 PROCEDURE — 96415 CHEMO IV INFUSION ADDL HR: CPT

## 2023-03-27 PROCEDURE — 96413 CHEMO IV INFUSION 1 HR: CPT

## 2023-03-27 PROCEDURE — 6370000000 HC RX 637 (ALT 250 FOR IP): Performed by: INTERNAL MEDICINE

## 2023-03-27 PROCEDURE — 96367 TX/PROPH/DG ADDL SEQ IV INF: CPT

## 2023-03-27 PROCEDURE — 2580000003 HC RX 258: Performed by: INTERNAL MEDICINE

## 2023-03-27 RX ORDER — SODIUM CHLORIDE 9 MG/ML
5-250 INJECTION, SOLUTION INTRAVENOUS PRN
Status: DISCONTINUED | OUTPATIENT
Start: 2023-03-27 | End: 2023-03-28 | Stop reason: HOSPADM

## 2023-03-27 RX ORDER — DIPHENHYDRAMINE HYDROCHLORIDE 50 MG/ML
50 INJECTION INTRAMUSCULAR; INTRAVENOUS ONCE
Status: COMPLETED | OUTPATIENT
Start: 2023-03-27 | End: 2023-03-27

## 2023-03-27 RX ORDER — HEPARIN SODIUM (PORCINE) LOCK FLUSH IV SOLN 100 UNIT/ML 100 UNIT/ML
500 SOLUTION INTRAVENOUS PRN
Status: DISCONTINUED | OUTPATIENT
Start: 2023-03-27 | End: 2023-03-28 | Stop reason: HOSPADM

## 2023-03-27 RX ORDER — ACETAMINOPHEN 325 MG/1
650 TABLET ORAL ONCE
Status: COMPLETED | OUTPATIENT
Start: 2023-03-27 | End: 2023-03-27

## 2023-03-27 RX ORDER — SODIUM CHLORIDE 9 MG/ML
5-40 INJECTION INTRAVENOUS PRN
Status: DISCONTINUED | OUTPATIENT
Start: 2023-03-27 | End: 2023-03-28 | Stop reason: HOSPADM

## 2023-03-27 RX ADMIN — DEXAMETHASONE SODIUM PHOSPHATE 10 MG: 4 INJECTION INTRA-ARTICULAR; INTRALESIONAL; INTRAMUSCULAR; INTRAVENOUS; SOFT TISSUE at 09:08

## 2023-03-27 RX ADMIN — SODIUM CHLORIDE, PRESERVATIVE FREE 10 ML: 5 INJECTION INTRAVENOUS at 12:52

## 2023-03-27 RX ADMIN — SODIUM CHLORIDE 250 ML/HR: 9 INJECTION, SOLUTION INTRAVENOUS at 09:07

## 2023-03-27 RX ADMIN — ACETAMINOPHEN 650 MG: 325 TABLET ORAL at 08:56

## 2023-03-27 RX ADMIN — DIPHENHYDRAMINE HYDROCHLORIDE 50 MG: 50 INJECTION INTRAMUSCULAR; INTRAVENOUS at 09:04

## 2023-03-27 RX ADMIN — HEPARIN 500 UNITS: 100 SYRINGE at 12:54

## 2023-03-27 RX ADMIN — RITUXIMAB 660 MG: 10 INJECTION, SOLUTION INTRAVENOUS at 10:10

## 2023-03-27 ASSESSMENT — PAIN SCALES - GENERAL: PAINLEVEL_OUTOF10: 0

## 2023-03-27 NOTE — PROGRESS NOTES
[facility] Progress Note    Date: 2023    Name: Birgit Reed              MRN: [id number]              : 1950    Chemotherapy Cycle:C14D1  Rituxan       Ms. Lalita Rucker was assessed and education was provided to her and her daughter regarding treatment plan. Ms. Lalita Rucker speaks very little english per patient and daughter. Translation services being used via ipad. Treatment education reviewed with Ms. Lalita Rucker and daughter. She denies fever, runny nose any n/v/d. Pt is afebrile and in NAD. Ms. Lonnie Dougherty vitals were reviewed. Visit Vitals  BP (!) 135/55 (BP 1 Location: Left upper arm, BP Patient Position: Sitting)   Pulse 76   Temp 97.5 °F (36.4 °C)   Resp 16   SpO2 100%       Lab results were obtained and reviewed dated 3/24/23. Mediport accessed with 20 gauge 0.75 inch needle without complications. Port flushes without resistance and positive brisk blood return noted. Treatment initiated per orders. NS  ml bolus initiated. NS infusing IV 100ml/hr. Pre-medications (Tylenol 650 mg po, Benadryl 50 mg IV, dexamethasone 10 mg IVPB) were administered as ordered. Rituxan 660 mg was initiated at 50 mg/hr (25 ml/hr) for 30 minutes, increased by 25ml every 30 minutes to max rate of  200 ml/hr per patient request,  without complications. Pt tolerates well, is resting comfortably. Port flushed and de-accessed per orders, per protocol without complications. Band aid applied to site. Ms. Lalita Rucker tolerated infusions, and had no complaints at this time. Armband removed and shredded. Ms. Lalita Rucker was discharged from [place] [address] in stable condition at 1300. She is to return on 23 at 0800 for her next appointment.     Queenie Crouch RN  2023

## 2023-04-11 RX ORDER — EPINEPHRINE 1 MG/ML
0.3 INJECTION, SOLUTION, CONCENTRATE INTRAVENOUS PRN
Status: CANCELLED | OUTPATIENT
Start: 2023-04-17

## 2023-04-11 RX ORDER — ALBUTEROL SULFATE 90 UG/1
4 AEROSOL, METERED RESPIRATORY (INHALATION) PRN
Status: CANCELLED | OUTPATIENT
Start: 2023-04-17

## 2023-04-11 RX ORDER — SODIUM CHLORIDE 9 MG/ML
INJECTION, SOLUTION INTRAVENOUS CONTINUOUS
Status: CANCELLED | OUTPATIENT
Start: 2023-04-17

## 2023-04-11 RX ORDER — SODIUM CHLORIDE 9 MG/ML
5-250 INJECTION, SOLUTION INTRAVENOUS PRN
Status: CANCELLED | OUTPATIENT
Start: 2023-04-17

## 2023-04-11 RX ORDER — MEPERIDINE HYDROCHLORIDE 25 MG/ML
12.5 INJECTION INTRAMUSCULAR; INTRAVENOUS; SUBCUTANEOUS PRN
Status: CANCELLED | OUTPATIENT
Start: 2023-04-17

## 2023-04-11 RX ORDER — ACETAMINOPHEN 325 MG/1
650 TABLET ORAL
Status: CANCELLED | OUTPATIENT
Start: 2023-04-17

## 2023-04-11 RX ORDER — DIPHENHYDRAMINE HYDROCHLORIDE 50 MG/ML
50 INJECTION INTRAMUSCULAR; INTRAVENOUS
Status: CANCELLED | OUTPATIENT
Start: 2023-04-17

## 2023-04-11 RX ORDER — ONDANSETRON 2 MG/ML
8 INJECTION INTRAMUSCULAR; INTRAVENOUS
Status: CANCELLED | OUTPATIENT
Start: 2023-04-17

## 2023-04-14 ENCOUNTER — HOSPITAL ENCOUNTER (OUTPATIENT)
Facility: HOSPITAL | Age: 73
Setting detail: INFUSION SERIES
End: 2023-04-14

## 2023-04-14 ENCOUNTER — TELEPHONE (OUTPATIENT)
Age: 73
End: 2023-04-14

## 2023-04-14 VITALS
TEMPERATURE: 97.5 F | SYSTOLIC BLOOD PRESSURE: 131 MMHG | WEIGHT: 164.1 LBS | HEIGHT: 59 IN | HEART RATE: 78 BPM | RESPIRATION RATE: 16 BRPM | DIASTOLIC BLOOD PRESSURE: 65 MMHG | BODY MASS INDEX: 33.08 KG/M2

## 2023-04-14 LAB
ALBUMIN SERPL-MCNC: 3.3 G/DL (ref 3.4–5)
ALBUMIN/GLOB SERPL: 1.1 (ref 0.8–1.7)
ALP SERPL-CCNC: 92 U/L (ref 45–117)
ALT SERPL-CCNC: 79 U/L (ref 13–56)
ANION GAP SERPL CALC-SCNC: 11 MMOL/L (ref 3–18)
AST SERPL-CCNC: 41 U/L (ref 10–38)
BASO+EOS+MONOS # BLD AUTO: 1 K/UL (ref 0–2.3)
BASO+EOS+MONOS NFR BLD AUTO: 14 % (ref 0.1–17)
BILIRUB SERPL-MCNC: 0.5 MG/DL (ref 0.2–1)
BUN SERPL-MCNC: 20 MG/DL (ref 7–18)
BUN/CREAT SERPL: 27 (ref 12–20)
CALCIUM SERPL-MCNC: 8.5 MG/DL (ref 8.5–10.1)
CHLORIDE SERPL-SCNC: 97 MMOL/L (ref 100–111)
CO2 SERPL-SCNC: 26 MMOL/L (ref 21–32)
CREAT SERPL-MCNC: 0.74 MG/DL (ref 0.6–1.3)
DIFFERENTIAL METHOD BLD: ABNORMAL
ERYTHROCYTE [DISTWIDTH] IN BLOOD BY AUTOMATED COUNT: 15.3 % (ref 11.5–14.5)
GLOBULIN SER CALC-MCNC: 3 G/DL (ref 2–4)
GLUCOSE SERPL-MCNC: 270 MG/DL (ref 74–99)
HCT VFR BLD AUTO: 38.4 % (ref 36–48)
HGB BLD-MCNC: 12.9 G/DL (ref 12–16)
LYMPHOCYTES # BLD: 1.4 K/UL (ref 1.1–5.9)
LYMPHOCYTES NFR BLD: 20 % (ref 14–44)
MCH RBC QN AUTO: 27.5 PG (ref 25–35)
MCHC RBC AUTO-ENTMCNC: 33.6 G/DL (ref 31–37)
MCV RBC AUTO: 81.9 FL (ref 78–102)
NEUTS SEG # BLD: 4.7 K/UL (ref 1.8–9.5)
NEUTS SEG NFR BLD: 66 % (ref 40–70)
PLATELET # BLD AUTO: 196 K/UL (ref 140–440)
POTASSIUM SERPL-SCNC: 2.9 MMOL/L (ref 3.5–5.5)
PROT SERPL-MCNC: 6.3 G/DL (ref 6.4–8.2)
RBC # BLD AUTO: 4.69 M/UL (ref 4.1–5.1)
SODIUM SERPL-SCNC: 134 MMOL/L (ref 136–145)
WBC # BLD AUTO: 7.1 K/UL (ref 4.5–13)

## 2023-04-14 PROCEDURE — 6360000002 HC RX W HCPCS: Performed by: INTERNAL MEDICINE

## 2023-04-14 PROCEDURE — 80053 COMPREHEN METABOLIC PANEL: CPT

## 2023-04-14 PROCEDURE — 85025 COMPLETE CBC W/AUTO DIFF WBC: CPT

## 2023-04-14 PROCEDURE — 36591 DRAW BLOOD OFF VENOUS DEVICE: CPT

## 2023-04-14 PROCEDURE — 2580000003 HC RX 258: Performed by: INTERNAL MEDICINE

## 2023-04-14 RX ORDER — SODIUM CHLORIDE 0.9 % (FLUSH) 0.9 %
5-40 SYRINGE (ML) INJECTION PRN
Status: DISCONTINUED | OUTPATIENT
Start: 2023-04-14 | End: 2023-10-28 | Stop reason: HOSPADM

## 2023-04-14 RX ORDER — HEPARIN SODIUM (PORCINE) LOCK FLUSH IV SOLN 100 UNIT/ML 100 UNIT/ML
500 SOLUTION INTRAVENOUS PRN
Status: DISCONTINUED | OUTPATIENT
Start: 2023-04-14 | End: 2023-10-28 | Stop reason: HOSPADM

## 2023-04-14 RX ADMIN — SODIUM CHLORIDE, PRESERVATIVE FREE 10 ML: 5 INJECTION INTRAVENOUS at 08:40

## 2023-04-14 RX ADMIN — Medication 500 UNITS: at 08:40

## 2023-04-14 NOTE — PROGRESS NOTES
[facility] Progress Note    Date: 2023    Name: Amanda Dill              MRN: [id number]              : 1950    Pre-Chemo Labs       Ms. Lisa Cho was assessed and education was provided. Ms. Marco Antonio Mercado vitals were reviewed. Vitals:    23 0815   BP: 131/65   Pulse: 78   Resp: 16   Temp: 97.5 °F (36.4 °C)       Lab results were obtained and reviewed. Recent Results (from the past 12 hour(s))   CBC with Partial Differential    Collection Time: 23  8:30 AM   Result Value Ref Range    WBC 7.1 4.5 - 13.0 K/uL    RBC 4.69 4.10 - 5.10 M/uL    Hemoglobin 12.9 12.0 - 16.0 g/dL    Hematocrit 38.4 36 - 48 %    MCV 81.9 78 - 102 FL    MCH 27.5 25.0 - 35.0 PG    MCHC 33.6 31 - 37 g/dL    RDW 15.3 (H) 11.5 - 14.5 %    Platelets 014 354 - 560 K/uL    Seg Neutrophils 66 40 - 70 %    Mixed Cells 14 0.1 - 17 %    Lymphocytes 20 14 - 44 %    Segs Absolute 4.7 1.8 - 9.5 K/UL    ABSOLUTE MIXED CELLS 1.0 0.0 - 2.3 K/uL    Absolute Lymph # 1.4 1.1 - 5.9 K/UL    Differential Type AUTOMATED         Right chest medi-port accessed and blood drawn per orders, per protocol without complications. Port flushed and de-accessed per orders, per protocol without complications. Band aid applied to site       Ms. Lisa Cho tolerated the blood draw, and had no complaints at this time. Armband removed and shredded. Ms. Lisa Cho tolerated infusion, and had no complaints at this time. Armband removed and shredded. Ms. Lisa Cho was discharged from [place] [address] in stable condition at [address] [place] [address]. She is to return on 23 at 0800 for her next appointment.     [address], RN  2023  9:05 AM

## 2023-04-17 ENCOUNTER — HOSPITAL ENCOUNTER (OUTPATIENT)
Facility: HOSPITAL | Age: 73
Setting detail: INFUSION SERIES
End: 2023-04-17

## 2023-04-17 VITALS
TEMPERATURE: 97.3 F | SYSTOLIC BLOOD PRESSURE: 138 MMHG | DIASTOLIC BLOOD PRESSURE: 70 MMHG | HEART RATE: 72 BPM | RESPIRATION RATE: 16 BRPM | OXYGEN SATURATION: 100 %

## 2023-04-17 DIAGNOSIS — C83.30 DIFFUSE LARGE B-CELL LYMPHOMA, UNSPECIFIED BODY REGION (HCC): Primary | ICD-10-CM

## 2023-04-17 PROCEDURE — 96415 CHEMO IV INFUSION ADDL HR: CPT

## 2023-04-17 PROCEDURE — 6370000000 HC RX 637 (ALT 250 FOR IP): Performed by: INTERNAL MEDICINE

## 2023-04-17 PROCEDURE — 6360000002 HC RX W HCPCS: Performed by: INTERNAL MEDICINE

## 2023-04-17 PROCEDURE — 96413 CHEMO IV INFUSION 1 HR: CPT

## 2023-04-17 PROCEDURE — 96375 TX/PRO/DX INJ NEW DRUG ADDON: CPT

## 2023-04-17 PROCEDURE — 2580000003 HC RX 258: Performed by: INTERNAL MEDICINE

## 2023-04-17 PROCEDURE — 96367 TX/PROPH/DG ADDL SEQ IV INF: CPT

## 2023-04-17 RX ORDER — SODIUM CHLORIDE 0.9 % (FLUSH) 0.9 %
5-40 SYRINGE (ML) INJECTION PRN
Status: DISCONTINUED | OUTPATIENT
Start: 2023-04-17 | End: 2023-04-18 | Stop reason: HOSPADM

## 2023-04-17 RX ORDER — SODIUM CHLORIDE 9 MG/ML
5-250 INJECTION, SOLUTION INTRAVENOUS PRN
Status: DISCONTINUED | OUTPATIENT
Start: 2023-04-17 | End: 2023-04-18 | Stop reason: HOSPADM

## 2023-04-17 RX ORDER — ACETAMINOPHEN 325 MG/1
650 TABLET ORAL ONCE
Status: COMPLETED | OUTPATIENT
Start: 2023-04-17 | End: 2023-04-17

## 2023-04-17 RX ORDER — HEPARIN SODIUM (PORCINE) LOCK FLUSH IV SOLN 100 UNIT/ML 100 UNIT/ML
500 SOLUTION INTRAVENOUS PRN
Status: DISCONTINUED | OUTPATIENT
Start: 2023-04-17 | End: 2023-04-18 | Stop reason: HOSPADM

## 2023-04-17 RX ORDER — DIPHENHYDRAMINE HYDROCHLORIDE 50 MG/ML
50 INJECTION INTRAMUSCULAR; INTRAVENOUS ONCE
Status: COMPLETED | OUTPATIENT
Start: 2023-04-17 | End: 2023-04-17

## 2023-04-17 RX ADMIN — DEXAMETHASONE SODIUM PHOSPHATE 10 MG: 10 INJECTION, SOLUTION INTRAMUSCULAR; INTRAVENOUS at 09:35

## 2023-04-17 RX ADMIN — RITUXIMAB 660 MG: 10 INJECTION, SOLUTION INTRAVENOUS at 10:33

## 2023-04-17 RX ADMIN — Medication 500 UNITS: at 13:25

## 2023-04-17 RX ADMIN — DIPHENHYDRAMINE HYDROCHLORIDE 50 MG: 50 INJECTION, SOLUTION INTRAMUSCULAR; INTRAVENOUS at 08:49

## 2023-04-17 RX ADMIN — SODIUM CHLORIDE, PRESERVATIVE FREE 10 ML: 5 INJECTION INTRAVENOUS at 13:25

## 2023-04-17 RX ADMIN — ACETAMINOPHEN 650 MG: 325 TABLET ORAL at 08:48

## 2023-04-17 RX ADMIN — SODIUM CHLORIDE 250 ML/HR: 0.9 INJECTION, SOLUTION INTRAVENOUS at 08:39

## 2023-04-17 ASSESSMENT — PAIN SCALES - GENERAL: PAINLEVEL_OUTOF10: 0

## 2023-04-17 NOTE — PROGRESS NOTES
[facility] Progress Note    Date: 2023    Name: Taylor Dunaway              MRN: [id number]              : 1950    Chemotherapy Cycle:C15D1  Rituxan       Ms. Yessenia Castro was assessed and education was provided to her and her daughter regarding treatment plan. Ms. Yessenia Castro speaks very little english per patient and daughter. Translation services being used via ipad. Treatment education reviewed with Ms. Yessenia Castro and daughter. She denies fever, runny nose any n/v/d. Pt is afebrile and in NAD. Ms. Bel Cruz vitals were reviewed. Visit Vitals  BP (!) 135/55 (BP 1 Location: Left upper arm, BP Patient Position: Sitting)   Pulse 76   Temp 97.5 °F (36.4 °C)   Resp 16   SpO2 100%       Lab results were obtained and reviewed dated 23. Orders were called in from Dr. Julienne Burdick office for oral potassium d/t patient K+=2.9. Daughter to  prescription for potassium today from the pharmacy- pt will start her dose at home per her daughter. Mediport accessed with 20 gauge 0.75 inch needle without complications. Port flushes without resistance and positive brisk blood return noted. Treatment initiated per orders. NS  ml bolus initiated. NS infusing IV 100ml/hr. Pre-medications (Tylenol 650 mg po, Benadryl 50 mg IV, dexamethasone 20 mg IVPB) were administered as ordered. Rituxan 660 mg was initiated at 50 mg/hr (25 ml/hr) for 30 minutes, increased by 25ml every 30 minutes to max rate of  200 ml/hr per patient request,  without complications. Pt tolerates well, is resting comfortably. Port flushed and de-accessed per orders, per protocol without complications. Band aid applied to site. Ms. Yessenia Castro tolerated infusions, and had no complaints at this time. Armband removed and shredded. Ms. Yessenia Castro was discharged from [place] [address] in stable condition at 1325. She is to return on 23 at 0800 for her next appointment.     Ora Reubening,

## 2023-05-01 RX ORDER — SODIUM CHLORIDE 9 MG/ML
INJECTION, SOLUTION INTRAVENOUS CONTINUOUS
Status: CANCELLED | OUTPATIENT
Start: 2023-05-08

## 2023-05-01 RX ORDER — ALBUTEROL SULFATE 90 UG/1
4 AEROSOL, METERED RESPIRATORY (INHALATION) PRN
Status: CANCELLED | OUTPATIENT
Start: 2023-05-08

## 2023-05-01 RX ORDER — MEPERIDINE HYDROCHLORIDE 25 MG/ML
12.5 INJECTION INTRAMUSCULAR; INTRAVENOUS; SUBCUTANEOUS PRN
Status: CANCELLED | OUTPATIENT
Start: 2023-05-08

## 2023-05-01 RX ORDER — ONDANSETRON 2 MG/ML
8 INJECTION INTRAMUSCULAR; INTRAVENOUS
Status: CANCELLED | OUTPATIENT
Start: 2023-05-08

## 2023-05-01 RX ORDER — SODIUM CHLORIDE 9 MG/ML
5-250 INJECTION, SOLUTION INTRAVENOUS PRN
Status: CANCELLED | OUTPATIENT
Start: 2023-05-08

## 2023-05-01 RX ORDER — DIPHENHYDRAMINE HYDROCHLORIDE 50 MG/ML
50 INJECTION INTRAMUSCULAR; INTRAVENOUS
Status: CANCELLED | OUTPATIENT
Start: 2023-05-08

## 2023-05-01 RX ORDER — EPINEPHRINE 1 MG/ML
0.3 INJECTION, SOLUTION, CONCENTRATE INTRAVENOUS PRN
Status: CANCELLED | OUTPATIENT
Start: 2023-05-08

## 2023-05-01 RX ORDER — ACETAMINOPHEN 325 MG/1
650 TABLET ORAL
Status: CANCELLED | OUTPATIENT
Start: 2023-05-08

## 2023-05-05 ENCOUNTER — HOSPITAL ENCOUNTER (OUTPATIENT)
Facility: HOSPITAL | Age: 73
Setting detail: INFUSION SERIES
End: 2023-05-05

## 2023-05-05 VITALS
RESPIRATION RATE: 16 BRPM | BODY MASS INDEX: 34.03 KG/M2 | HEIGHT: 59 IN | DIASTOLIC BLOOD PRESSURE: 81 MMHG | WEIGHT: 168.8 LBS | SYSTOLIC BLOOD PRESSURE: 156 MMHG | HEART RATE: 71 BPM | TEMPERATURE: 98 F | OXYGEN SATURATION: 100 %

## 2023-05-05 LAB
ANION GAP SERPL CALC-SCNC: 6 MMOL/L (ref 3–18)
BASO+EOS+MONOS # BLD AUTO: 1 K/UL (ref 0–2.3)
BASO+EOS+MONOS NFR BLD AUTO: 20 % (ref 0.1–17)
BUN SERPL-MCNC: 11 MG/DL (ref 7–18)
BUN/CREAT SERPL: 17 (ref 12–20)
CALCIUM SERPL-MCNC: 8.9 MG/DL (ref 8.5–10.1)
CHLORIDE SERPL-SCNC: 104 MMOL/L (ref 100–111)
CO2 SERPL-SCNC: 27 MMOL/L (ref 21–32)
CREAT SERPL-MCNC: 0.66 MG/DL (ref 0.6–1.3)
DIFFERENTIAL METHOD BLD: ABNORMAL
ERYTHROCYTE [DISTWIDTH] IN BLOOD BY AUTOMATED COUNT: 14.6 % (ref 11.5–14.5)
GLUCOSE SERPL-MCNC: 189 MG/DL (ref 74–99)
HCT VFR BLD AUTO: 37.7 % (ref 36–48)
HGB BLD-MCNC: 12.2 G/DL (ref 12–16)
LYMPHOCYTES # BLD: 1.1 K/UL (ref 1.1–5.9)
LYMPHOCYTES NFR BLD: 22 % (ref 14–44)
MCH RBC QN AUTO: 27.6 PG (ref 25–35)
MCHC RBC AUTO-ENTMCNC: 32.4 G/DL (ref 31–37)
MCV RBC AUTO: 85.3 FL (ref 78–102)
NEUTS SEG # BLD: 2.9 K/UL (ref 1.8–9.5)
NEUTS SEG NFR BLD: 58 % (ref 40–70)
PLATELET # BLD AUTO: 182 K/UL (ref 140–440)
POTASSIUM SERPL-SCNC: 3.9 MMOL/L (ref 3.5–5.5)
RBC # BLD AUTO: 4.42 M/UL (ref 4.1–5.1)
SODIUM SERPL-SCNC: 137 MMOL/L (ref 136–145)
WBC # BLD AUTO: 5 K/UL (ref 4.5–13)

## 2023-05-05 PROCEDURE — 80048 BASIC METABOLIC PNL TOTAL CA: CPT

## 2023-05-05 PROCEDURE — 36415 COLL VENOUS BLD VENIPUNCTURE: CPT

## 2023-05-05 PROCEDURE — 36591 DRAW BLOOD OFF VENOUS DEVICE: CPT

## 2023-05-05 PROCEDURE — 85025 COMPLETE CBC W/AUTO DIFF WBC: CPT

## 2023-05-05 RX ORDER — HEPARIN SODIUM (PORCINE) LOCK FLUSH IV SOLN 100 UNIT/ML 100 UNIT/ML
500 SOLUTION INTRAVENOUS PRN
Status: DISCONTINUED | OUTPATIENT
Start: 2023-05-05 | End: 2023-10-28 | Stop reason: HOSPADM

## 2023-05-05 RX ORDER — SODIUM CHLORIDE 0.9 % (FLUSH) 0.9 %
5-40 SYRINGE (ML) INJECTION PRN
Status: DISCONTINUED | OUTPATIENT
Start: 2023-05-05 | End: 2023-10-28 | Stop reason: HOSPADM

## 2023-05-05 ASSESSMENT — PAIN SCALES - GENERAL: PAINLEVEL_OUTOF10: 0

## 2023-05-05 NOTE — PROGRESS NOTES
[facility] Progress Note    Date: May 5, 2023    Name: Mali Regalado              MRN: [id number]              : 1950    Pre-Chemo Labs      Ms. Nelson Roche was assessed and education was provided. Ms. Benedict Slider vitals were reviewed. Vitals:    23 0800   BP: (!) 156/81   Pulse: 71   Resp: 16   Temp: 98 °F (36.7 °C)   SpO2: 100%       Lab results were obtained and reviewed. Recent Results (from the past 12 hour(s))   CBC with Partial Differential    Collection Time: 23  8:30 AM   Result Value Ref Range    WBC 5.0 4.5 - 13.0 K/uL    RBC 4.42 4.10 - 5.10 M/uL    Hemoglobin 12.2 12.0 - 16.0 g/dL    Hematocrit 37.7 36 - 48 %    MCV 85.3 78 - 102 FL    MCH 27.6 25.0 - 35.0 PG    MCHC 32.4 31 - 37 g/dL    RDW 14.6 (H) 11.5 - 14.5 %    Platelets 530 454 - 951 K/uL    Seg Neutrophils 58 40 - 70 %    Mixed Cells 20 (H) 0.1 - 17 %    Lymphocytes 22 14 - 44 %    Segs Absolute 2.9 1.8 - 9.5 K/UL    ABSOLUTE MIXED CELLS 1.0 0.0 - 2.3 K/uL    Absolute Lymph # 1.1 1.1 - 5.9 K/UL    Differential Type AUTOMATED       Right chest medi-port accessed and blood drawn per orders, per protocol without complications. Port flushed and de-accessed per orders, per protocol without complications. Band aid applied to site       Ms. Nelson Roche tolerated the blood draw, and had no complaints at this time. Armband removed and shredded. Ms. Nelson Roche tolerated procedure, and had no complaints at this time. Armband removed and shredded    Ms. Nelson Roche was discharged from [place] [address] in stable condition at [place] [address] [facility]. She is to return on 23 at 0800 for her next appointment.     Barb Wilkerson RN  May 5, 2023  11:13 AM

## 2023-05-08 ENCOUNTER — HOSPITAL ENCOUNTER (OUTPATIENT)
Facility: HOSPITAL | Age: 73
Setting detail: INFUSION SERIES
End: 2023-05-08

## 2023-05-08 VITALS
DIASTOLIC BLOOD PRESSURE: 75 MMHG | OXYGEN SATURATION: 98 % | TEMPERATURE: 97.3 F | SYSTOLIC BLOOD PRESSURE: 143 MMHG | RESPIRATION RATE: 16 BRPM | HEART RATE: 78 BPM

## 2023-05-08 DIAGNOSIS — C83.30 DIFFUSE LARGE B-CELL LYMPHOMA, UNSPECIFIED BODY REGION (HCC): Primary | ICD-10-CM

## 2023-05-08 PROCEDURE — 96413 CHEMO IV INFUSION 1 HR: CPT

## 2023-05-08 PROCEDURE — 96375 TX/PRO/DX INJ NEW DRUG ADDON: CPT

## 2023-05-08 PROCEDURE — 6360000002 HC RX W HCPCS: Performed by: INTERNAL MEDICINE

## 2023-05-08 PROCEDURE — 96415 CHEMO IV INFUSION ADDL HR: CPT

## 2023-05-08 PROCEDURE — 2580000003 HC RX 258: Performed by: INTERNAL MEDICINE

## 2023-05-08 PROCEDURE — 6370000000 HC RX 637 (ALT 250 FOR IP): Performed by: INTERNAL MEDICINE

## 2023-05-08 PROCEDURE — 96367 TX/PROPH/DG ADDL SEQ IV INF: CPT

## 2023-05-08 RX ORDER — ACETAMINOPHEN 325 MG/1
650 TABLET ORAL ONCE
Status: COMPLETED | OUTPATIENT
Start: 2023-05-08 | End: 2023-05-08

## 2023-05-08 RX ORDER — SODIUM CHLORIDE 9 MG/ML
5-250 INJECTION, SOLUTION INTRAVENOUS PRN
Status: DISCONTINUED | OUTPATIENT
Start: 2023-05-08 | End: 2023-05-09 | Stop reason: HOSPADM

## 2023-05-08 RX ORDER — HEPARIN SODIUM (PORCINE) LOCK FLUSH IV SOLN 100 UNIT/ML 100 UNIT/ML
500 SOLUTION INTRAVENOUS PRN
Status: DISCONTINUED | OUTPATIENT
Start: 2023-05-08 | End: 2023-05-09 | Stop reason: HOSPADM

## 2023-05-08 RX ORDER — DIPHENHYDRAMINE HYDROCHLORIDE 50 MG/ML
50 INJECTION INTRAMUSCULAR; INTRAVENOUS ONCE
Status: COMPLETED | OUTPATIENT
Start: 2023-05-08 | End: 2023-05-08

## 2023-05-08 RX ORDER — SODIUM CHLORIDE 0.9 % (FLUSH) 0.9 %
5-40 SYRINGE (ML) INJECTION PRN
Status: DISCONTINUED | OUTPATIENT
Start: 2023-05-08 | End: 2023-05-09 | Stop reason: HOSPADM

## 2023-05-08 RX ADMIN — SODIUM CHLORIDE, PRESERVATIVE FREE 10 ML: 5 INJECTION INTRAVENOUS at 08:25

## 2023-05-08 RX ADMIN — ACETAMINOPHEN 650 MG: 325 TABLET ORAL at 08:33

## 2023-05-08 RX ADMIN — SODIUM CHLORIDE 250 ML/HR: 0.9 INJECTION, SOLUTION INTRAVENOUS at 08:25

## 2023-05-08 RX ADMIN — SODIUM CHLORIDE, PRESERVATIVE FREE 10 ML: 5 INJECTION INTRAVENOUS at 09:40

## 2023-05-08 RX ADMIN — HEPARIN 500 UNITS: 100 SYRINGE at 13:15

## 2023-05-08 RX ADMIN — DEXAMETHASONE SODIUM PHOSPHATE 10 MG: 10 INJECTION, SOLUTION INTRAMUSCULAR; INTRAVENOUS at 08:49

## 2023-05-08 RX ADMIN — RITUXIMAB 660 MG: 10 INJECTION, SOLUTION INTRAVENOUS at 09:46

## 2023-05-08 RX ADMIN — SODIUM CHLORIDE, PRESERVATIVE FREE 10 ML: 5 INJECTION INTRAVENOUS at 13:12

## 2023-05-08 RX ADMIN — DIPHENHYDRAMINE HYDROCHLORIDE 50 MG: 50 INJECTION, SOLUTION INTRAMUSCULAR; INTRAVENOUS at 08:34

## 2023-05-08 ASSESSMENT — PAIN SCALES - GENERAL: PAINLEVEL_OUTOF10: 0

## 2023-05-08 NOTE — PROGRESS NOTES
[facility] Progress Note    Date: May 08, 2023    Name: Brit Zavaleta              MRN: [id number]              : 1950    Chemotherapy Cycle:C16D1  Rituxan       Ms. Ralf Horn was assessed and education was provided to her and her daughter regarding treatment plan. Ms. Ralf Horn speaks very little english per patient and daughter. Translation services being used via ipad. Treatment education reviewed with Ms. Ralf Horn and daughter. She denies fever, runny nose any n/v/d. Pt is afebrile and in NAD. Ms. Maegan Javed vitals were reviewed. Visit Vitals    Patient Vitals for the past 12 hrs:   Temp Pulse Resp BP SpO2   23 0815 -- 78 16 (!) 143/75 --   23 0800 97.3 °F (36.3 °C) 83 16 (!) 162/80 98 %         Lab results were obtained and reviewed dated 23. Mediport accessed with 20 gauge 0.75 inch needle without complications. Port flushes without resistance and positive brisk blood return noted. Treatment initiated per orders. NS  ml bolus initiated. NS infusing IV 50 ml/hr. Pre-medications (Tylenol 650 mg po, Benadryl 50 mg IV, dexamethasone 10 mg IVPB) were administered as ordered. Rituxan 660 mg was initiated at 50 mg/hr (25 ml/hr) for 30 minutes, increased by 25ml every 30 minutes to max rate of  200 ml/hr per patient request,  without complications. Pt tolerates well, is resting comfortably. Port flushed and de-accessed per orders, per protocol without complications. Band aid applied to site. Ms. Ralf Horn tolerated infusions, and had no complaints at this time. Armband removed and shredded. Ms. Ralf Horn was discharged from [place] [address] in stable condition at 1315. She is to return on 23 at 0800 for her next appointment.     Rhys Coreas RN  May 08, 2023

## 2023-05-09 DIAGNOSIS — C83.35 DIFFUSE LARGE B-CELL LYMPHOMA, LYMPH NODES OF INGUINAL REGION AND LOWER LIMB (HCC): Primary | ICD-10-CM

## 2023-05-14 DIAGNOSIS — C83.35 DIFFUSE LARGE B-CELL LYMPHOMA, LYMPH NODES OF INGUINAL REGION AND LOWER LIMB (HCC): ICD-10-CM

## 2023-05-15 RX ORDER — POTASSIUM CHLORIDE 750 MG/1
TABLET, EXTENDED RELEASE ORAL
Qty: 60 TABLET | Refills: 0 | OUTPATIENT
Start: 2023-05-15

## 2023-05-22 RX ORDER — SODIUM CHLORIDE 9 MG/ML
INJECTION, SOLUTION INTRAVENOUS CONTINUOUS
Status: CANCELLED | OUTPATIENT
Start: 2023-05-30

## 2023-05-22 RX ORDER — EPINEPHRINE 1 MG/ML
0.3 INJECTION, SOLUTION, CONCENTRATE INTRAVENOUS PRN
Status: CANCELLED | OUTPATIENT
Start: 2023-05-30

## 2023-05-22 RX ORDER — DIPHENHYDRAMINE HYDROCHLORIDE 50 MG/ML
50 INJECTION INTRAMUSCULAR; INTRAVENOUS
Status: CANCELLED | OUTPATIENT
Start: 2023-05-30

## 2023-05-22 RX ORDER — ACETAMINOPHEN 325 MG/1
650 TABLET ORAL
Status: CANCELLED | OUTPATIENT
Start: 2023-05-30

## 2023-05-22 RX ORDER — SODIUM CHLORIDE 9 MG/ML
5-250 INJECTION, SOLUTION INTRAVENOUS PRN
Status: CANCELLED | OUTPATIENT
Start: 2023-05-30

## 2023-05-22 RX ORDER — HEPARIN SODIUM (PORCINE) LOCK FLUSH IV SOLN 100 UNIT/ML 100 UNIT/ML
500 SOLUTION INTRAVENOUS PRN
Status: CANCELLED | OUTPATIENT
Start: 2023-05-30

## 2023-05-22 RX ORDER — MEPERIDINE HYDROCHLORIDE 25 MG/ML
12.5 INJECTION INTRAMUSCULAR; INTRAVENOUS; SUBCUTANEOUS PRN
Status: CANCELLED | OUTPATIENT
Start: 2023-05-30

## 2023-05-22 RX ORDER — ONDANSETRON 2 MG/ML
8 INJECTION INTRAMUSCULAR; INTRAVENOUS
Status: CANCELLED | OUTPATIENT
Start: 2023-05-30

## 2023-05-22 RX ORDER — ALBUTEROL SULFATE 90 UG/1
4 AEROSOL, METERED RESPIRATORY (INHALATION) PRN
Status: CANCELLED | OUTPATIENT
Start: 2023-05-30

## 2023-05-26 ENCOUNTER — HOSPITAL ENCOUNTER (OUTPATIENT)
Facility: HOSPITAL | Age: 73
Setting detail: INFUSION SERIES
End: 2023-05-26

## 2023-05-26 VITALS
HEART RATE: 80 BPM | DIASTOLIC BLOOD PRESSURE: 77 MMHG | HEIGHT: 59 IN | TEMPERATURE: 98 F | RESPIRATION RATE: 16 BRPM | BODY MASS INDEX: 34.13 KG/M2 | WEIGHT: 169.31 LBS | OXYGEN SATURATION: 98 % | SYSTOLIC BLOOD PRESSURE: 140 MMHG

## 2023-05-26 LAB
ALBUMIN SERPL-MCNC: 3.5 G/DL (ref 3.4–5)
ALBUMIN/GLOB SERPL: 1.2 (ref 0.8–1.7)
ALP SERPL-CCNC: 84 U/L (ref 45–117)
ALT SERPL-CCNC: 50 U/L (ref 13–56)
ANION GAP SERPL CALC-SCNC: 8 MMOL/L (ref 3–18)
AST SERPL-CCNC: 28 U/L (ref 10–38)
BASO+EOS+MONOS # BLD AUTO: 1.1 K/UL (ref 0–2.3)
BASO+EOS+MONOS NFR BLD AUTO: 18 % (ref 0.1–17)
BILIRUB SERPL-MCNC: 0.3 MG/DL (ref 0.2–1)
BUN SERPL-MCNC: 15 MG/DL (ref 7–18)
BUN/CREAT SERPL: 23 (ref 12–20)
CALCIUM SERPL-MCNC: 9.1 MG/DL (ref 8.5–10.1)
CHLORIDE SERPL-SCNC: 103 MMOL/L (ref 100–111)
CO2 SERPL-SCNC: 27 MMOL/L (ref 21–32)
CREAT SERPL-MCNC: 0.65 MG/DL (ref 0.6–1.3)
DIFFERENTIAL METHOD BLD: ABNORMAL
ERYTHROCYTE [DISTWIDTH] IN BLOOD BY AUTOMATED COUNT: 14.5 % (ref 11.5–14.5)
GLOBULIN SER CALC-MCNC: 2.9 G/DL (ref 2–4)
GLUCOSE SERPL-MCNC: 124 MG/DL (ref 74–99)
HCT VFR BLD AUTO: 39.2 % (ref 36–48)
HGB BLD-MCNC: 12.9 G/DL (ref 12–16)
LYMPHOCYTES # BLD: 1.3 K/UL (ref 1.1–5.9)
LYMPHOCYTES NFR BLD: 22 % (ref 14–44)
MCH RBC QN AUTO: 27.9 PG (ref 25–35)
MCHC RBC AUTO-ENTMCNC: 32.9 G/DL (ref 31–37)
MCV RBC AUTO: 84.8 FL (ref 78–102)
NEUTS SEG # BLD: 3.7 K/UL (ref 1.8–9.5)
NEUTS SEG NFR BLD: 60 % (ref 40–70)
PLATELET # BLD AUTO: 172 K/UL (ref 140–440)
POTASSIUM SERPL-SCNC: 3.3 MMOL/L (ref 3.5–5.5)
PROT SERPL-MCNC: 6.4 G/DL (ref 6.4–8.2)
RBC # BLD AUTO: 4.62 M/UL (ref 4.1–5.1)
SODIUM SERPL-SCNC: 138 MMOL/L (ref 136–145)
WBC # BLD AUTO: 6.1 K/UL (ref 4.5–13)

## 2023-05-26 PROCEDURE — 36591 DRAW BLOOD OFF VENOUS DEVICE: CPT

## 2023-05-26 PROCEDURE — 80053 COMPREHEN METABOLIC PANEL: CPT

## 2023-05-26 PROCEDURE — 85025 COMPLETE CBC W/AUTO DIFF WBC: CPT

## 2023-05-26 PROCEDURE — 6360000002 HC RX W HCPCS

## 2023-05-26 PROCEDURE — 2580000003 HC RX 258: Performed by: INTERNAL MEDICINE

## 2023-05-26 RX ORDER — SODIUM CHLORIDE 0.9 % (FLUSH) 0.9 %
5-40 SYRINGE (ML) INJECTION PRN
Status: DISCONTINUED | OUTPATIENT
Start: 2023-05-26 | End: 2023-10-28 | Stop reason: HOSPADM

## 2023-05-26 RX ORDER — HEPARIN SODIUM (PORCINE) LOCK FLUSH IV SOLN 100 UNIT/ML 100 UNIT/ML
SOLUTION INTRAVENOUS
Status: COMPLETED
Start: 2023-05-26 | End: 2023-05-26

## 2023-05-26 RX ADMIN — HEPARIN 500 UNITS: 100 SYRINGE at 08:39

## 2023-05-26 RX ADMIN — SODIUM CHLORIDE, PRESERVATIVE FREE 10 ML: 5 INJECTION INTRAVENOUS at 08:37

## 2023-05-26 NOTE — PROGRESS NOTES
[facility] Progress Note    Date: May 26, 2023    Name: Hernan Chacon              MRN: [id number]              : 1950    Pre-Chemo Labs      Ms. Michael Torres was assessed and education was provided. Ms. Kay Taylor vitals were reviewed. Vitals:    23 0800   BP: (!) 140/77   Pulse: 80   Resp: 16   Temp: 98 °F (36.7 °C)   SpO2: 98%       Lab results were obtained and reviewed. Recent Results (from the past 12 hour(s))   CBC with Partial Differential    Collection Time: 23  8:30 AM   Result Value Ref Range    WBC 6.1 4.5 - 13.0 K/uL    RBC 4.62 4.10 - 5.10 M/uL    Hemoglobin 12.9 12.0 - 16.0 g/dL    Hematocrit 39.2 36 - 48 %    MCV 84.8 78 - 102 FL    MCH 27.9 25.0 - 35.0 PG    MCHC 32.9 31 - 37 g/dL    RDW 14.5 11.5 - 14.5 %    Platelets 314 108 - 275 K/uL    Neutrophils % 60 40 - 70 %    Mixed Cells 18 (H) 0.1 - 17 %    Lymphocytes % 22 14 - 44 %    Neutrophils Absolute 3.7 1.8 - 9.5 K/UL    ABSOLUTE MIXED CELLS 1.1 0.0 - 2.3 K/uL    Lymphocytes Absolute 1.3 1.1 - 5.9 K/UL    Differential Type AUTOMATED       Right chest medi-port accessed and blood drawn per orders, per protocol without complications. Port flushed and de-accessed per orders, per protocol without complications. Band aid applied to site      Ms. Michael Torres tolerated the blood draw, and had no complaints at this time. Armband removed and shredded. Ms. Michael Torres tolerated procedure, and had no complaints at this time. Armband removed and shredded    Ms. Michael Torres was discharged from [place] [address] in stable condition at [place] [address] [facility]. She is to return on 23 at 0800 for her next appointment.     Bronwyn Jerome RN  May 26, 2023  9:03 AM

## 2023-05-30 ENCOUNTER — HOSPITAL ENCOUNTER (OUTPATIENT)
Facility: HOSPITAL | Age: 73
Setting detail: INFUSION SERIES
End: 2023-05-30

## 2023-05-30 VITALS
HEART RATE: 78 BPM | DIASTOLIC BLOOD PRESSURE: 77 MMHG | SYSTOLIC BLOOD PRESSURE: 149 MMHG | TEMPERATURE: 98.2 F | RESPIRATION RATE: 16 BRPM

## 2023-05-30 DIAGNOSIS — C83.30 DIFFUSE LARGE B-CELL LYMPHOMA, UNSPECIFIED BODY REGION (HCC): Primary | ICD-10-CM

## 2023-05-30 PROCEDURE — 6370000000 HC RX 637 (ALT 250 FOR IP): Performed by: INTERNAL MEDICINE

## 2023-05-30 PROCEDURE — 6360000002 HC RX W HCPCS: Performed by: INTERNAL MEDICINE

## 2023-05-30 PROCEDURE — 2580000003 HC RX 258: Performed by: INTERNAL MEDICINE

## 2023-05-30 PROCEDURE — 96415 CHEMO IV INFUSION ADDL HR: CPT

## 2023-05-30 PROCEDURE — 96413 CHEMO IV INFUSION 1 HR: CPT

## 2023-05-30 PROCEDURE — 96375 TX/PRO/DX INJ NEW DRUG ADDON: CPT

## 2023-05-30 PROCEDURE — 96367 TX/PROPH/DG ADDL SEQ IV INF: CPT

## 2023-05-30 RX ORDER — ACETAMINOPHEN 325 MG/1
650 TABLET ORAL ONCE
Status: COMPLETED | OUTPATIENT
Start: 2023-05-30 | End: 2023-05-30

## 2023-05-30 RX ORDER — SODIUM CHLORIDE 0.9 % (FLUSH) 0.9 %
5-40 SYRINGE (ML) INJECTION PRN
Status: DISCONTINUED | OUTPATIENT
Start: 2023-05-30 | End: 2023-05-31 | Stop reason: HOSPADM

## 2023-05-30 RX ORDER — SODIUM CHLORIDE 9 MG/ML
5-250 INJECTION, SOLUTION INTRAVENOUS PRN
Status: DISCONTINUED | OUTPATIENT
Start: 2023-05-30 | End: 2023-05-31 | Stop reason: HOSPADM

## 2023-05-30 RX ORDER — DIPHENHYDRAMINE HYDROCHLORIDE 50 MG/ML
50 INJECTION INTRAMUSCULAR; INTRAVENOUS ONCE
Status: COMPLETED | OUTPATIENT
Start: 2023-05-30 | End: 2023-05-30

## 2023-05-30 RX ADMIN — SODIUM CHLORIDE 25 ML/HR: 9 INJECTION, SOLUTION INTRAVENOUS at 08:30

## 2023-05-30 RX ADMIN — RITUXIMAB 660 MG: 10 INJECTION, SOLUTION INTRAVENOUS at 09:53

## 2023-05-30 RX ADMIN — DIPHENHYDRAMINE HYDROCHLORIDE 50 MG: 50 INJECTION, SOLUTION INTRAMUSCULAR; INTRAVENOUS at 09:31

## 2023-05-30 RX ADMIN — ACETAMINOPHEN 650 MG: 325 TABLET ORAL at 08:53

## 2023-05-30 RX ADMIN — SODIUM CHLORIDE, PRESERVATIVE FREE 10 ML: 5 INJECTION INTRAVENOUS at 08:30

## 2023-05-30 RX ADMIN — SODIUM CHLORIDE, PRESERVATIVE FREE 10 ML: 5 INJECTION INTRAVENOUS at 13:34

## 2023-05-30 RX ADMIN — DEXAMETHASONE SODIUM PHOSPHATE 10 MG: 10 INJECTION, SOLUTION INTRAMUSCULAR; INTRAVENOUS at 08:54

## 2023-05-30 ASSESSMENT — PAIN SCALES - GENERAL: PAINLEVEL_OUTOF10: 0

## 2023-05-30 NOTE — PROGRESS NOTES
[facility] Progress Note    Date: May 30, 2023    Name: Yahir Jarrell              MRN: [id number]              : 1950    Chemotherapy Cycle:C17D1       Ms. Shaun Graff was assessed and education was provided. Lab results were obtained and reviewed dated 23. Labs within parameters for treatment today. Ms. Shaun Graff accompanied by her daughter today and denies any issues or concerns today. Ms. Muro Cleverandressa vitals were reviewed. Vitals:    23 0830   BP: (!) 149/77   Pulse: 78   Resp: 16   Temp: 98.2 °F (36.8 °C)     Mediport accessed with 20 gauge 0.75 inch needle without complications. Port flushes without resistance and positive brisk blood return noted. Treatment initiated per orders. NS IV initiated @ [facility]. Pre-medications (Tylenol 650 mg po, Benadryl 50 mg IV, dexamethasone 20 mg IVPB) were administered as ordered. Rituxan 660 mg was initiated at 50 mg/hr (25 ml/hr) for 30 minutes, increased by 25ml every 30 minutes to max rate of  200 ml/hr without complications. Pt tolerates well, is resting comfortably. Port flushed and de-accessed per orders, per protocol without complications. Band aid applied to site. Ms. Shaun Graff tolerated infusions, and had no complaints at this time. Armband removed and shredded    Ms. Shaun Graff was discharged from [place] [address] in stable condition at [phone]. She is to return on 23 at 0800 for her next appointment.     Becky Rodriguez RN  May 30, 2023  9:04 AM

## 2023-06-06 ENCOUNTER — HOSPITAL ENCOUNTER (OUTPATIENT)
Facility: HOSPITAL | Age: 73
Discharge: HOME | End: 2023-06-09
Attending: INTERNAL MEDICINE

## 2023-06-06 DIAGNOSIS — C83.35 DIFFUSE LARGE B-CELL LYMPHOMA, LYMPH NODES OF INGUINAL REGION AND LOWER LIMB (HCC): ICD-10-CM

## 2023-06-06 PROCEDURE — A9552 F18 FDG: HCPCS | Performed by: INTERNAL MEDICINE

## 2023-06-06 PROCEDURE — 3430000000 HC RX DIAGNOSTIC RADIOPHARMACEUTICAL: Performed by: INTERNAL MEDICINE

## 2023-06-06 PROCEDURE — 78815 PET IMAGE W/CT SKULL-THIGH: CPT

## 2023-06-06 RX ORDER — FLUDEOXYGLUCOSE F-18 500 MCI/ML
10.05 INJECTION INTRAVENOUS
Status: COMPLETED | OUTPATIENT
Start: 2023-06-06 | End: 2023-06-06

## 2023-06-06 RX ADMIN — FLUDEOXYGLUCOSE F-18 10.05 MILLICURIE: 500 INJECTION INTRAVENOUS at 10:17

## 2023-06-14 RX ORDER — ACETAMINOPHEN 325 MG/1
650 TABLET ORAL
Status: CANCELLED | OUTPATIENT
Start: 2023-06-19

## 2023-06-14 RX ORDER — SODIUM CHLORIDE 9 MG/ML
INJECTION, SOLUTION INTRAVENOUS CONTINUOUS
Status: CANCELLED | OUTPATIENT
Start: 2023-06-19

## 2023-06-14 RX ORDER — EPINEPHRINE 1 MG/ML
0.3 INJECTION, SOLUTION, CONCENTRATE INTRAVENOUS PRN
Status: CANCELLED | OUTPATIENT
Start: 2023-06-19

## 2023-06-14 RX ORDER — MEPERIDINE HYDROCHLORIDE 25 MG/ML
12.5 INJECTION INTRAMUSCULAR; INTRAVENOUS; SUBCUTANEOUS PRN
Status: CANCELLED | OUTPATIENT
Start: 2023-06-19

## 2023-06-14 RX ORDER — SODIUM CHLORIDE 9 MG/ML
5-250 INJECTION, SOLUTION INTRAVENOUS PRN
Status: CANCELLED | OUTPATIENT
Start: 2023-06-19

## 2023-06-14 RX ORDER — ALBUTEROL SULFATE 90 UG/1
4 AEROSOL, METERED RESPIRATORY (INHALATION) PRN
Status: CANCELLED | OUTPATIENT
Start: 2023-06-19

## 2023-06-14 RX ORDER — ONDANSETRON 2 MG/ML
8 INJECTION INTRAMUSCULAR; INTRAVENOUS
Status: CANCELLED | OUTPATIENT
Start: 2023-06-19

## 2023-06-14 RX ORDER — DIPHENHYDRAMINE HYDROCHLORIDE 50 MG/ML
50 INJECTION INTRAMUSCULAR; INTRAVENOUS
Status: CANCELLED | OUTPATIENT
Start: 2023-06-19

## 2023-06-16 ENCOUNTER — HOSPITAL ENCOUNTER (OUTPATIENT)
Facility: HOSPITAL | Age: 73
Setting detail: INFUSION SERIES
Discharge: HOME | End: 2023-06-16

## 2023-06-16 VITALS
BODY MASS INDEX: 33.67 KG/M2 | TEMPERATURE: 98 F | RESPIRATION RATE: 120 BRPM | SYSTOLIC BLOOD PRESSURE: 153 MMHG | HEART RATE: 63 BPM | OXYGEN SATURATION: 98 % | HEIGHT: 59 IN | DIASTOLIC BLOOD PRESSURE: 77 MMHG | WEIGHT: 167 LBS

## 2023-06-16 LAB
ALBUMIN SERPL-MCNC: 3.4 G/DL (ref 3.4–5)
ALBUMIN/GLOB SERPL: 1.1 (ref 0.8–1.7)
ALP SERPL-CCNC: 81 U/L (ref 45–117)
ALT SERPL-CCNC: 44 U/L (ref 13–56)
ANION GAP SERPL CALC-SCNC: 8 MMOL/L (ref 3–18)
AST SERPL-CCNC: 22 U/L (ref 10–38)
BASO+EOS+MONOS # BLD AUTO: 1.2 K/UL (ref 0–2.3)
BASO+EOS+MONOS NFR BLD AUTO: 18 % (ref 0.1–17)
BILIRUB SERPL-MCNC: 0.4 MG/DL (ref 0.2–1)
BUN SERPL-MCNC: 23 MG/DL (ref 7–18)
BUN/CREAT SERPL: 30 (ref 12–20)
CALCIUM SERPL-MCNC: 9.1 MG/DL (ref 8.5–10.1)
CHLORIDE SERPL-SCNC: 105 MMOL/L (ref 100–111)
CO2 SERPL-SCNC: 28 MMOL/L (ref 21–32)
CREAT SERPL-MCNC: 0.76 MG/DL (ref 0.6–1.3)
DIFFERENTIAL METHOD BLD: ABNORMAL
ERYTHROCYTE [DISTWIDTH] IN BLOOD BY AUTOMATED COUNT: 14.1 % (ref 11.5–14.5)
GLOBULIN SER CALC-MCNC: 3.1 G/DL (ref 2–4)
GLUCOSE SERPL-MCNC: 147 MG/DL (ref 74–99)
HCT VFR BLD AUTO: 37.8 % (ref 36–48)
HGB BLD-MCNC: 12.7 G/DL (ref 12–16)
LYMPHOCYTES # BLD: 1.2 K/UL (ref 1.1–5.9)
LYMPHOCYTES NFR BLD: 18 % (ref 14–44)
MCH RBC QN AUTO: 28.4 PG (ref 25–35)
MCHC RBC AUTO-ENTMCNC: 33.6 G/DL (ref 31–37)
MCV RBC AUTO: 84.6 FL (ref 78–102)
NEUTS SEG # BLD: 4.5 K/UL (ref 1.8–9.5)
NEUTS SEG NFR BLD: 64 % (ref 40–70)
PLATELET # BLD AUTO: 194 K/UL (ref 140–440)
POTASSIUM SERPL-SCNC: 2.9 MMOL/L (ref 3.5–5.5)
PROT SERPL-MCNC: 6.5 G/DL (ref 6.4–8.2)
RBC # BLD AUTO: 4.47 M/UL (ref 4.1–5.1)
SODIUM SERPL-SCNC: 141 MMOL/L (ref 136–145)
WBC # BLD AUTO: 6.9 K/UL (ref 4.5–13)

## 2023-06-16 PROCEDURE — 80053 COMPREHEN METABOLIC PANEL: CPT

## 2023-06-16 PROCEDURE — 6360000002 HC RX W HCPCS: Performed by: INTERNAL MEDICINE

## 2023-06-16 PROCEDURE — 2580000003 HC RX 258: Performed by: INTERNAL MEDICINE

## 2023-06-16 PROCEDURE — 36591 DRAW BLOOD OFF VENOUS DEVICE: CPT

## 2023-06-16 PROCEDURE — 85025 COMPLETE CBC W/AUTO DIFF WBC: CPT

## 2023-06-16 RX ORDER — HEPARIN 100 UNIT/ML
SYRINGE INTRAVENOUS
Status: DISPENSED
Start: 2023-06-16 | End: 2023-06-16

## 2023-06-16 RX ORDER — HEPARIN SODIUM (PORCINE) LOCK FLUSH IV SOLN 100 UNIT/ML 100 UNIT/ML
500 SOLUTION INTRAVENOUS PRN
Status: DISCONTINUED | OUTPATIENT
Start: 2023-06-16 | End: 2023-10-28 | Stop reason: HOSPADM

## 2023-06-16 RX ORDER — SODIUM CHLORIDE 0.9 % (FLUSH) 0.9 %
5-40 SYRINGE (ML) INJECTION PRN
Status: DISCONTINUED | OUTPATIENT
Start: 2023-06-16 | End: 2023-10-28 | Stop reason: HOSPADM

## 2023-06-16 RX ADMIN — Medication 30 ML: at 08:30

## 2023-06-16 RX ADMIN — HEPARIN 500 UNITS: 100 SYRINGE at 08:31

## 2023-06-16 ASSESSMENT — PAIN SCALES - GENERAL: PAINLEVEL_OUTOF10: 0

## 2023-06-16 NOTE — PROGRESS NOTES
[address] [facility] Progress Note    Date: 2023    Name: Evan Sanchez              MRN: [id number]              : 1950    Pre-Chemo Labs      Ms. Jose Burgos was assessed and education was provided. Ms. Sanchez Luna vitals were reviewed. Vitals:    23 0819   BP: (!) 153/77   Pulse: 63   Resp: (!) 120   Temp: 98 °F (36.7 °C)   SpO2: 98%       Lab results were obtained and reviewed. Recent Results (from the past 12 hour(s))   CBC with Partial Differential    Collection Time: 23  8:32 AM   Result Value Ref Range    WBC 6.9 4.5 - 13.0 K/uL    RBC 4.47 4.10 - 5.10 M/uL    Hemoglobin 12.7 12.0 - 16.0 g/dL    Hematocrit 37.8 36 - 48 %    MCV 84.6 78 - 102 FL    MCH 28.4 25.0 - 35.0 PG    MCHC 33.6 31 - 37 g/dL    RDW 14.1 11.5 - 14.5 %    Platelets 040 847 - 588 K/uL    Neutrophils % 64 40 - 70 %    Mixed Cells 18 (H) 0.1 - 17 %    Lymphocytes % 18 14 - 44 %    Neutrophils Absolute 4.5 1.8 - 9.5 K/UL    ABSOLUTE MIXED CELLS 1.2 0.0 - 2.3 K/uL    Lymphocytes Absolute 1.2 1.1 - 5.9 K/UL    Differential Type AUTOMATED     Comprehensive Metabolic Panel    Collection Time: 23  8:32 AM   Result Value Ref Range    Sodium 141 136 - 145 mmol/L    Potassium PENDING mmol/L    Chloride 105 100 - 111 mmol/L    CO2 28 21 - 32 mmol/L    Anion Gap 8 3.0 - 18 mmol/L    Glucose 147 (H) 74 - 99 mg/dL    BUN 23 (H) 7.0 - 18 MG/DL    Creatinine 0.76 0.6 - 1.3 MG/DL    Bun/Cre Ratio 30 (H) 12 - 20      Est, Glom Filt Rate >60 >60 ml/min/1.73m2    Calcium 9.1 8.5 - 10.1 MG/DL    Total Bilirubin 0.4 0.2 - 1.0 MG/DL    ALT 44 13 - 56 U/L    AST 22 10 - 38 U/L    Alk Phosphatase 81 45 - 117 U/L    Total Protein 6.5 6.4 - 8.2 g/dL    Albumin 3.4 3.4 - 5.0 g/dL    Globulin 3.1 2.0 - 4.0 g/dL    Albumin/Globulin Ratio 1.1 0.8 - 1.7       Right chest medi-port accessed and blood drawn per orders, per protocol without complications. Port flushed and de-accessed per orders, per protocol without complications.   Band

## 2023-06-19 ENCOUNTER — HOSPITAL ENCOUNTER (OUTPATIENT)
Facility: HOSPITAL | Age: 73
Setting detail: INFUSION SERIES
End: 2023-06-19

## 2023-06-19 VITALS
OXYGEN SATURATION: 97 % | DIASTOLIC BLOOD PRESSURE: 94 MMHG | TEMPERATURE: 97 F | SYSTOLIC BLOOD PRESSURE: 159 MMHG | RESPIRATION RATE: 16 BRPM | HEART RATE: 77 BPM

## 2023-06-19 DIAGNOSIS — C83.30 DIFFUSE LARGE B-CELL LYMPHOMA, UNSPECIFIED BODY REGION (HCC): Primary | ICD-10-CM

## 2023-06-19 PROCEDURE — 96375 TX/PRO/DX INJ NEW DRUG ADDON: CPT

## 2023-06-19 PROCEDURE — 6370000000 HC RX 637 (ALT 250 FOR IP): Performed by: INTERNAL MEDICINE

## 2023-06-19 PROCEDURE — 96415 CHEMO IV INFUSION ADDL HR: CPT

## 2023-06-19 PROCEDURE — 6360000002 HC RX W HCPCS: Performed by: INTERNAL MEDICINE

## 2023-06-19 PROCEDURE — 96367 TX/PROPH/DG ADDL SEQ IV INF: CPT

## 2023-06-19 PROCEDURE — 2580000003 HC RX 258: Performed by: INTERNAL MEDICINE

## 2023-06-19 PROCEDURE — 96413 CHEMO IV INFUSION 1 HR: CPT

## 2023-06-19 RX ORDER — ACETAMINOPHEN 325 MG/1
650 TABLET ORAL ONCE
Status: COMPLETED | OUTPATIENT
Start: 2023-06-19 | End: 2023-06-19

## 2023-06-19 RX ORDER — DIPHENHYDRAMINE HYDROCHLORIDE 50 MG/ML
50 INJECTION INTRAMUSCULAR; INTRAVENOUS ONCE
Status: COMPLETED | OUTPATIENT
Start: 2023-06-19 | End: 2023-06-19

## 2023-06-19 RX ORDER — SODIUM CHLORIDE 9 MG/ML
5-250 INJECTION, SOLUTION INTRAVENOUS PRN
Status: DISCONTINUED | OUTPATIENT
Start: 2023-06-19 | End: 2023-06-20 | Stop reason: HOSPADM

## 2023-06-19 RX ORDER — HEPARIN 100 UNIT/ML
500 SYRINGE INTRAVENOUS PRN
Status: DISCONTINUED | OUTPATIENT
Start: 2023-06-19 | End: 2023-06-20 | Stop reason: HOSPADM

## 2023-06-19 RX ORDER — SODIUM CHLORIDE 0.9 % (FLUSH) 0.9 %
5-40 SYRINGE (ML) INJECTION PRN
Status: DISCONTINUED | OUTPATIENT
Start: 2023-06-19 | End: 2023-06-20 | Stop reason: HOSPADM

## 2023-06-19 RX ADMIN — DIPHENHYDRAMINE HYDROCHLORIDE 50 MG: 50 INJECTION, SOLUTION INTRAMUSCULAR; INTRAVENOUS at 08:53

## 2023-06-19 RX ADMIN — ACETAMINOPHEN 650 MG: 325 TABLET ORAL at 08:53

## 2023-06-19 RX ADMIN — HEPARIN 500 UNITS: 100 SYRINGE at 13:56

## 2023-06-19 RX ADMIN — RITUXIMAB 660 MG: 10 INJECTION, SOLUTION INTRAVENOUS at 10:08

## 2023-06-19 RX ADMIN — SODIUM CHLORIDE 25 ML/HR: 0.9 INJECTION, SOLUTION INTRAVENOUS at 08:42

## 2023-06-19 RX ADMIN — SODIUM CHLORIDE, PRESERVATIVE FREE 10 ML: 5 INJECTION INTRAVENOUS at 08:40

## 2023-06-19 RX ADMIN — DEXAMETHASONE SODIUM PHOSPHATE 10 MG: 10 INJECTION, SOLUTION INTRAMUSCULAR; INTRAVENOUS at 08:59

## 2023-06-19 RX ADMIN — SODIUM CHLORIDE, PRESERVATIVE FREE 10 ML: 5 INJECTION INTRAVENOUS at 13:55

## 2023-06-19 NOTE — PROGRESS NOTES
[facility] Progress Note    Date: 2023    Name: Johnny Padilla              MRN: [id number]              : 1950    Chemotherapy Cycle: Merlin Rebel     Ms. Almaz Sawyer was assessed and education was provided. Lab results were obtained and reviewed dated 23. Labs within parameters for treatment today. Ms. Almaz Sawyer accompanied by her daughter today and denies any issues or concerns today. Ms. Dallin Perales vitals were reviewed. Vitals:    23 1355   BP: (!) 159/94   Pulse: 77   Resp: 16   Temp: 97 °F (36.1 °C)   SpO2: 97%     Right chest Mediport accessed with 20 gauge 0.75 inch needle without complications. Port flushes without resistance and positive brisk blood return noted. Treatment initiated per orders. [facility]. Pre-medications (Tylenol 650 mg po, Benadryl 50 mg IV, dexamethasone 20 mg IVPB) were administered as ordered. Rituxan 660 mg was initiated at 50 mg/hr (25 ml/hr) for 30 minutes, increased by 25ml every 30 minutes to max rate of  200 ml/hr without complications. At 1328, pt felt slightly short of breath, vital signs WNL, sat pt upright in bed, pt stated \"easier to breath now. \" Pt tolerated treatment well otherwise. Patient Vitals for the past 12 hrs:   Temp Pulse Resp BP SpO2   23 1355 97 °F (36.1 °C) 77 16 (!) 159/94 97 %   23 1328 97.8 °F (36.6 °C) 76 18 (!) 161/92 --   23 0827 97 °F (36.1 °C) 69 16 (!) 151/77 97 %      Port flushed w NS and heparin 500 units and de-accessed per orders, per protocol without complications. Band aid applied to site. Ms. Almaz Sawyer tolerated infusions, and had no complaints at this time. Armband removed and shredded    Ms. Almaz Sawyer was discharged from [place] [address] in stable condition at 1400. She has no more appointments at this time, she has completed her chemo regimen.     Jamar Pandey RN  2023  3:46 PM

## 2023-06-28 RX ORDER — LIDOCAINE AND PRILOCAINE 25; 25 MG/G; MG/G
CREAM TOPICAL
Qty: 30 G | Refills: 1 | Status: SHIPPED | OUTPATIENT
Start: 2023-06-28

## 2023-07-12 DIAGNOSIS — T45.1X5A ANTINEOPLASTIC CHEMOTHERAPY INDUCED ANEMIA: ICD-10-CM

## 2023-07-12 DIAGNOSIS — R11.2 CHEMOTHERAPY INDUCED NAUSEA AND VOMITING: ICD-10-CM

## 2023-07-12 DIAGNOSIS — T45.1X5A CHEMOTHERAPY INDUCED NAUSEA AND VOMITING: ICD-10-CM

## 2023-07-12 DIAGNOSIS — D64.81 ANTINEOPLASTIC CHEMOTHERAPY INDUCED ANEMIA: ICD-10-CM

## 2023-07-12 DIAGNOSIS — C83.35 DIFFUSE LARGE B-CELL LYMPHOMA, LYMPH NODES OF INGUINAL REGION AND LOWER LIMB (HCC): Primary | ICD-10-CM

## 2023-07-12 DIAGNOSIS — Z79.899 ON ANTINEOPLASTIC CHEMOTHERAPY: ICD-10-CM

## 2023-07-16 DIAGNOSIS — C83.35 DIFFUSE LARGE B-CELL LYMPHOMA, LYMPH NODES OF INGUINAL REGION AND LOWER LIMB (HCC): ICD-10-CM

## 2023-07-28 RX ORDER — POTASSIUM CHLORIDE 750 MG/1
TABLET, EXTENDED RELEASE ORAL
Qty: 60 TABLET | Refills: 0 | OUTPATIENT
Start: 2023-07-28

## (undated) DEVICE — COVER MPLR TIP CRV SCIS ACC DA VINCI

## (undated) DEVICE — Device: Brand: DEFENDO VALVE AND CONNECTOR KIT

## (undated) DEVICE — SOL IRR SOD CL 0.9% 1000ML BTL --

## (undated) DEVICE — GOWN,AURORA,FABRIC-REINFORCED,X-LARGE: Brand: MEDLINE

## (undated) DEVICE — REM POLYHESIVE ADULT PATIENT RETURN ELECTRODE: Brand: VALLEYLAB

## (undated) DEVICE — SYR LR LCK 1ML GRAD NSAF 30ML --

## (undated) DEVICE — Device

## (undated) DEVICE — PREP SKN CHLRAPRP APL 26ML STR --

## (undated) DEVICE — COVER LT HNDL BLU STRL -- MEDICHOICE

## (undated) DEVICE — SUTURE VCRL SZ 2-0 L27IN ABSRB UD L26MM SH 1/2 CIR J417H

## (undated) DEVICE — GARMENT,MEDLINE,DVT,INT,CALF,MED, GEN2: Brand: MEDLINE

## (undated) DEVICE — INSUFFLATION NEEDLE TO ESTABLISH PNEUMOPERITONEUM.: Brand: INSUFFLATION NEEDLE

## (undated) DEVICE — DRAPE TOWEL: Brand: CONVERTORS

## (undated) DEVICE — ENDOGATOR TUBING FOR BOSTON SCIENTIFIC ENDOSTAT II PUMP, OLYMPUS OFP PUMP OR ENDO STRATUS PUMP: Brand: ENDOGATOR

## (undated) DEVICE — SOL IRR STRL H2O 1000ML BTL --

## (undated) DEVICE — SUTURE ABSRB L6IN L37MM 0 GS-21 GRN 1/2 CIR TAPR PNT NDL VLOCL0306

## (undated) DEVICE — TUBING, SUCTION, 9/32" X 10', STRAIGHT: Brand: MEDLINE

## (undated) DEVICE — GARMENT,MEDLINE,DVT,INT,CALF,FOAM,MED: Brand: MEDLINE

## (undated) DEVICE — COLUMN DRAPE

## (undated) DEVICE — SUTURE ABSRB L12IN L12MM SZ 2-0 GS-22 VLT GLYCOLIDE VLOCM2115

## (undated) DEVICE — SYR 10ML LUER LOK 1/5ML GRAD --

## (undated) DEVICE — SUTURE MCRYL SZ 4-0 L27IN ABSRB UD L24MM PS-1 3/8 CIR PRIM Y935H

## (undated) DEVICE — ELECTRO LUBE IS A SINGLE PATIENT USE DEVICE THAT IS INTENDED TO BE USED ON ELECTROSURGICAL ELECTRODES TO REDUCE STICKING.: Brand: KEY SURGICAL ELECTRO LUBE

## (undated) DEVICE — SEAL UNIV 5-8MM DISP BX/10 -- DA VINCI XI - SNGL USE

## (undated) DEVICE — INTENDED FOR TISSUE SEPARATION, AND OTHER PROCEDURES THAT REQUIRE A SHARP SURGICAL BLADE TO PUNCTURE OR CUT.: Brand: BARD-PARKER ®  SAFETY SCALPED

## (undated) DEVICE — NDL PRT INJ NSAF BLNT 18GX1.5 --

## (undated) DEVICE — SOL IRR STRL H2O 500ML STRL --

## (undated) DEVICE — KIT COLON W/ 1.1OZ LUB AND 2 END

## (undated) DEVICE — BLANKET WRM AD PREM MISTRAL-AIR

## (undated) DEVICE — ARM DRAPE

## (undated) DEVICE — CONMED SCOPE SAVER BITE BLOCK, 20X27 MM: Brand: SCOPE SAVER

## (undated) DEVICE — DERMABOND SKIN ADH 0.7ML -- DERMABOND ADVANCED 12/BX

## (undated) DEVICE — TUBING INSUFFLATION CAP W/ EXT CARBON DIOX ENDO SMARTCAP

## (undated) DEVICE — STERILE POLYISOPRENE POWDER-FREE SURGICAL GLOVES: Brand: PROTEXIS

## (undated) DEVICE — BLADELESS OBTURATOR: Brand: WECK VISTA